# Patient Record
Sex: FEMALE | Race: WHITE | Employment: UNEMPLOYED | URBAN - METROPOLITAN AREA
[De-identification: names, ages, dates, MRNs, and addresses within clinical notes are randomized per-mention and may not be internally consistent; named-entity substitution may affect disease eponyms.]

---

## 2018-07-30 ENCOUNTER — OFFICE VISIT (OUTPATIENT)
Dept: DERMATOLOGY | Age: 83
End: 2018-07-30
Payer: MEDICARE

## 2018-07-30 VITALS
HEIGHT: 66 IN | OXYGEN SATURATION: 98 % | DIASTOLIC BLOOD PRESSURE: 71 MMHG | HEART RATE: 57 BPM | SYSTOLIC BLOOD PRESSURE: 133 MMHG | BODY MASS INDEX: 20.6 KG/M2 | WEIGHT: 128.2 LBS

## 2018-07-30 DIAGNOSIS — L08.9 PUSTULE: Primary | ICD-10-CM

## 2018-07-30 DIAGNOSIS — L57.0 ACTINIC KERATOSIS: ICD-10-CM

## 2018-07-30 DIAGNOSIS — L57.8 ACTINIC SKIN DAMAGE: ICD-10-CM

## 2018-07-30 PROCEDURE — 1123F ACP DISCUSS/DSCN MKR DOCD: CPT | Performed by: DERMATOLOGY

## 2018-07-30 PROCEDURE — 4040F PNEUMOC VAC/ADMIN/RCVD: CPT | Performed by: DERMATOLOGY

## 2018-07-30 PROCEDURE — G8420 CALC BMI NORM PARAMETERS: HCPCS | Performed by: DERMATOLOGY

## 2018-07-30 PROCEDURE — 1101F PT FALLS ASSESS-DOCD LE1/YR: CPT | Performed by: DERMATOLOGY

## 2018-07-30 PROCEDURE — G8400 PT W/DXA NO RESULTS DOC: HCPCS | Performed by: DERMATOLOGY

## 2018-07-30 PROCEDURE — G8427 DOCREV CUR MEDS BY ELIG CLIN: HCPCS | Performed by: DERMATOLOGY

## 2018-07-30 PROCEDURE — 1090F PRES/ABSN URINE INCON ASSESS: CPT | Performed by: DERMATOLOGY

## 2018-07-30 PROCEDURE — 99203 OFFICE O/P NEW LOW 30 MIN: CPT | Performed by: DERMATOLOGY

## 2018-07-30 PROCEDURE — 1036F TOBACCO NON-USER: CPT | Performed by: DERMATOLOGY

## 2018-07-30 RX ORDER — SIMVASTATIN 40 MG
10 TABLET ORAL
Refills: 0 | COMMUNITY
Start: 2018-06-14 | End: 2020-08-06 | Stop reason: SDUPTHER

## 2018-07-30 RX ORDER — LEVOTHYROXINE SODIUM 0.05 MG/1
75 TABLET ORAL DAILY
COMMUNITY
End: 2020-08-06 | Stop reason: SDUPTHER

## 2018-07-30 RX ORDER — RIVAROXABAN 20 MG/1
TABLET, FILM COATED ORAL
Refills: 0 | COMMUNITY
Start: 2018-07-27 | End: 2021-04-27

## 2018-07-30 RX ORDER — FLUOROURACIL 50 MG/G
CREAM TOPICAL
Qty: 40 G | Refills: 0 | Status: SHIPPED | OUTPATIENT
Start: 2018-07-30 | End: 2021-04-27

## 2019-04-24 ENCOUNTER — HOSPITAL ENCOUNTER (OUTPATIENT)
Age: 84
Setting detail: SPECIMEN
Discharge: HOME | End: 2019-04-24
Payer: MEDICARE

## 2019-04-24 LAB
ABSOLUTE EOS #: 0.22 K/UL (ref 0–0.44)
ABSOLUTE IMMATURE GRANULOCYTE: 0.03 K/UL (ref 0–0.3)
ABSOLUTE LYMPH #: 1.68 K/UL (ref 1.1–3.7)
ABSOLUTE MONO #: 0.73 K/UL (ref 0.1–1.2)
ANION GAP SERPL CALCULATED.3IONS-SCNC: 11 MMOL/L (ref 9–17)
BASOPHILS # BLD: 1 % (ref 0–2)
BASOPHILS ABSOLUTE: 0.05 K/UL (ref 0–0.2)
BUN BLDV-MCNC: 11 MG/DL (ref 8–23)
BUN/CREAT BLD: NORMAL (ref 9–20)
CALCIUM SERPL-MCNC: 8.9 MG/DL (ref 8.6–10.4)
CHLORIDE BLD-SCNC: 99 MMOL/L (ref 98–107)
CO2: 25 MMOL/L (ref 20–31)
CREAT SERPL-MCNC: 0.79 MG/DL (ref 0.5–0.9)
DIFFERENTIAL TYPE: ABNORMAL
EOSINOPHILS RELATIVE PERCENT: 3 % (ref 1–4)
GFR AFRICAN AMERICAN: >60 ML/MIN
GFR NON-AFRICAN AMERICAN: >60 ML/MIN
GFR SERPL CREATININE-BSD FRML MDRD: NORMAL ML/MIN/{1.73_M2}
GFR SERPL CREATININE-BSD FRML MDRD: NORMAL ML/MIN/{1.73_M2}
GLUCOSE BLD-MCNC: 83 MG/DL (ref 70–99)
HCT VFR BLD CALC: 37.9 % (ref 36.3–47.1)
HEMOGLOBIN: 12.8 G/DL (ref 11.9–15.1)
IMMATURE GRANULOCYTES: 0 %
LYMPHOCYTES # BLD: 22 % (ref 24–43)
MCH RBC QN AUTO: 33.1 PG (ref 25.2–33.5)
MCHC RBC AUTO-ENTMCNC: 33.8 G/DL (ref 28.4–34.8)
MCV RBC AUTO: 97.9 FL (ref 82.6–102.9)
MONOCYTES # BLD: 10 % (ref 3–12)
NRBC AUTOMATED: 0 PER 100 WBC
PDW BLD-RTO: 14.5 % (ref 11.8–14.4)
PLATELET # BLD: 246 K/UL (ref 138–453)
PLATELET ESTIMATE: ABNORMAL
PMV BLD AUTO: 9.3 FL (ref 8.1–13.5)
POTASSIUM SERPL-SCNC: 4 MMOL/L (ref 3.7–5.3)
RBC # BLD: 3.87 M/UL (ref 3.95–5.11)
RBC # BLD: ABNORMAL 10*6/UL
SEG NEUTROPHILS: 64 % (ref 36–65)
SEGMENTED NEUTROPHILS ABSOLUTE COUNT: 5.01 K/UL (ref 1.5–8.1)
SODIUM BLD-SCNC: 135 MMOL/L (ref 135–144)
WBC # BLD: 7.7 K/UL (ref 3.5–11.3)
WBC # BLD: ABNORMAL 10*3/UL

## 2019-04-24 PROCEDURE — 84436 ASSAY OF TOTAL THYROXINE: CPT

## 2019-04-24 PROCEDURE — 36415 COLL VENOUS BLD VENIPUNCTURE: CPT

## 2019-04-24 PROCEDURE — 84443 ASSAY THYROID STIM HORMONE: CPT

## 2019-04-24 PROCEDURE — 85025 COMPLETE CBC W/AUTO DIFF WBC: CPT

## 2019-04-24 PROCEDURE — 80048 BASIC METABOLIC PNL TOTAL CA: CPT

## 2019-04-24 PROCEDURE — P9603 ONE-WAY ALLOW PRORATED MILES: HCPCS

## 2019-04-24 PROCEDURE — 83735 ASSAY OF MAGNESIUM: CPT

## 2019-04-26 LAB
MAGNESIUM: 2 MG/DL (ref 1.6–2.6)
T4 TOTAL: 7.3 UG/DL (ref 4.5–12)
TSH SERPL DL<=0.05 MIU/L-ACNC: 49.73 MIU/L (ref 0.3–5)

## 2019-05-01 ENCOUNTER — HOSPITAL ENCOUNTER (OUTPATIENT)
Age: 84
Setting detail: SPECIMEN
Discharge: HOME | End: 2019-05-01
Payer: MEDICARE

## 2019-05-01 LAB
ABSOLUTE EOS #: 0.15 K/UL (ref 0–0.44)
ABSOLUTE IMMATURE GRANULOCYTE: <0.03 K/UL (ref 0–0.3)
ABSOLUTE LYMPH #: 1.74 K/UL (ref 1.1–3.7)
ABSOLUTE MONO #: 0.61 K/UL (ref 0.1–1.2)
ANION GAP SERPL CALCULATED.3IONS-SCNC: 11 MMOL/L (ref 9–17)
BASOPHILS # BLD: 1 % (ref 0–2)
BASOPHILS ABSOLUTE: 0.04 K/UL (ref 0–0.2)
BUN BLDV-MCNC: 13 MG/DL (ref 8–23)
BUN/CREAT BLD: NORMAL (ref 9–20)
CALCIUM SERPL-MCNC: 9.2 MG/DL (ref 8.6–10.4)
CHLORIDE BLD-SCNC: 103 MMOL/L (ref 98–107)
CO2: 27 MMOL/L (ref 20–31)
CREAT SERPL-MCNC: 0.76 MG/DL (ref 0.5–0.9)
DIFFERENTIAL TYPE: ABNORMAL
EOSINOPHILS RELATIVE PERCENT: 3 % (ref 1–4)
GFR AFRICAN AMERICAN: >60 ML/MIN
GFR NON-AFRICAN AMERICAN: >60 ML/MIN
GFR SERPL CREATININE-BSD FRML MDRD: NORMAL ML/MIN/{1.73_M2}
GFR SERPL CREATININE-BSD FRML MDRD: NORMAL ML/MIN/{1.73_M2}
GLUCOSE BLD-MCNC: 82 MG/DL (ref 70–99)
HCT VFR BLD CALC: 33.7 % (ref 36.3–47.1)
HEMOGLOBIN: 11.5 G/DL (ref 11.9–15.1)
IMMATURE GRANULOCYTES: 0 %
LYMPHOCYTES # BLD: 31 % (ref 24–43)
MCH RBC QN AUTO: 33.8 PG (ref 25.2–33.5)
MCHC RBC AUTO-ENTMCNC: 34.1 G/DL (ref 28.4–34.8)
MCV RBC AUTO: 99.1 FL (ref 82.6–102.9)
MONOCYTES # BLD: 11 % (ref 3–12)
NRBC AUTOMATED: 0 PER 100 WBC
PDW BLD-RTO: 14.2 % (ref 11.8–14.4)
PLATELET # BLD: 277 K/UL (ref 138–453)
PLATELET ESTIMATE: ABNORMAL
PMV BLD AUTO: 9.9 FL (ref 8.1–13.5)
POTASSIUM SERPL-SCNC: 3.8 MMOL/L (ref 3.7–5.3)
RBC # BLD: 3.4 M/UL (ref 3.95–5.11)
RBC # BLD: ABNORMAL 10*6/UL
SEG NEUTROPHILS: 54 % (ref 36–65)
SEGMENTED NEUTROPHILS ABSOLUTE COUNT: 3.13 K/UL (ref 1.5–8.1)
SODIUM BLD-SCNC: 141 MMOL/L (ref 135–144)
WBC # BLD: 5.7 K/UL (ref 3.5–11.3)
WBC # BLD: ABNORMAL 10*3/UL

## 2019-05-01 PROCEDURE — 36415 COLL VENOUS BLD VENIPUNCTURE: CPT

## 2019-05-01 PROCEDURE — 85025 COMPLETE CBC W/AUTO DIFF WBC: CPT

## 2019-05-01 PROCEDURE — P9603 ONE-WAY ALLOW PRORATED MILES: HCPCS

## 2019-05-01 PROCEDURE — 80048 BASIC METABOLIC PNL TOTAL CA: CPT

## 2019-07-30 ENCOUNTER — OFFICE VISIT (OUTPATIENT)
Dept: DERMATOLOGY | Age: 84
End: 2019-07-30
Payer: MEDICARE

## 2019-07-30 VITALS
HEIGHT: 66 IN | DIASTOLIC BLOOD PRESSURE: 62 MMHG | SYSTOLIC BLOOD PRESSURE: 109 MMHG | OXYGEN SATURATION: 96 % | HEART RATE: 57 BPM | WEIGHT: 129.8 LBS | BODY MASS INDEX: 20.86 KG/M2

## 2019-07-30 DIAGNOSIS — D48.5 NEOPLASM OF UNCERTAIN BEHAVIOR OF SKIN: Primary | ICD-10-CM

## 2019-07-30 DIAGNOSIS — L57.0 ACTINIC KERATOSIS: ICD-10-CM

## 2019-07-30 PROCEDURE — 17003 DESTRUCT PREMALG LES 2-14: CPT | Performed by: DERMATOLOGY

## 2019-07-30 PROCEDURE — 11102 TANGNTL BX SKIN SINGLE LES: CPT | Performed by: DERMATOLOGY

## 2019-07-30 PROCEDURE — 17000 DESTRUCT PREMALG LESION: CPT | Performed by: DERMATOLOGY

## 2019-07-31 ENCOUNTER — HOSPITAL ENCOUNTER (OUTPATIENT)
Age: 84
Setting detail: SPECIMEN
Discharge: HOME | End: 2019-07-31
Payer: MEDICARE

## 2019-07-31 RX ORDER — LIDOCAINE HYDROCHLORIDE AND EPINEPHRINE 10; 10 MG/ML; UG/ML
0.5 INJECTION, SOLUTION INFILTRATION; PERINEURAL ONCE
Status: COMPLETED | OUTPATIENT
Start: 2019-07-31 | End: 2019-07-31

## 2019-07-31 RX ADMIN — LIDOCAINE HYDROCHLORIDE AND EPINEPHRINE 0.5 ML: 10; 10 INJECTION, SOLUTION INFILTRATION; PERINEURAL at 07:08

## 2019-08-01 ENCOUNTER — TELEPHONE (OUTPATIENT)
Dept: DERMATOLOGY | Age: 84
End: 2019-08-01

## 2019-08-05 ENCOUNTER — TELEPHONE (OUTPATIENT)
Dept: DERMATOLOGY | Age: 84
End: 2019-08-05

## 2019-08-05 DIAGNOSIS — D48.1 ATYPICAL FIBROXANTHOMA: Primary | ICD-10-CM

## 2019-08-05 LAB — DERMATOLOGY PATHOLOGY REPORT: NORMAL

## 2019-08-05 NOTE — TELEPHONE ENCOUNTER
I called and discussed results with patient - AFX - needs Moh's surgery - I believe Chrissie Hubbard is the surgeon in network for her so that is where I placed referral - if not let me know and I will change to [employer],    Madie Burkett

## 2020-07-31 ENCOUNTER — OFFICE VISIT (OUTPATIENT)
Dept: FAMILY MEDICINE CLINIC | Age: 85
End: 2020-07-31
Payer: MEDICARE

## 2020-07-31 VITALS
BODY MASS INDEX: 21.56 KG/M2 | SYSTOLIC BLOOD PRESSURE: 129 MMHG | TEMPERATURE: 97 F | OXYGEN SATURATION: 98 % | WEIGHT: 129.4 LBS | HEART RATE: 49 BPM | HEIGHT: 65 IN | DIASTOLIC BLOOD PRESSURE: 62 MMHG

## 2020-07-31 PROCEDURE — 1090F PRES/ABSN URINE INCON ASSESS: CPT | Performed by: FAMILY MEDICINE

## 2020-07-31 PROCEDURE — 99205 OFFICE O/P NEW HI 60 MIN: CPT | Performed by: FAMILY MEDICINE

## 2020-07-31 PROCEDURE — G8427 DOCREV CUR MEDS BY ELIG CLIN: HCPCS | Performed by: FAMILY MEDICINE

## 2020-07-31 PROCEDURE — G8420 CALC BMI NORM PARAMETERS: HCPCS | Performed by: FAMILY MEDICINE

## 2020-07-31 PROCEDURE — 4040F PNEUMOC VAC/ADMIN/RCVD: CPT | Performed by: FAMILY MEDICINE

## 2020-07-31 PROCEDURE — 1123F ACP DISCUSS/DSCN MKR DOCD: CPT | Performed by: FAMILY MEDICINE

## 2020-07-31 PROCEDURE — 1036F TOBACCO NON-USER: CPT | Performed by: FAMILY MEDICINE

## 2020-07-31 PROCEDURE — G8400 PT W/DXA NO RESULTS DOC: HCPCS | Performed by: FAMILY MEDICINE

## 2020-07-31 RX ORDER — MULTIVITAMIN/IRON/FOLIC ACID 18MG-0.4MG
400 TABLET ORAL 2 TIMES DAILY
COMMUNITY
End: 2021-07-14

## 2020-07-31 RX ORDER — PANCRELIPASE 60000; 12000; 38000 [USP'U]/1; [USP'U]/1; [USP'U]/1
12000 CAPSULE, DELAYED RELEASE PELLETS ORAL
COMMUNITY
End: 2020-08-06 | Stop reason: SDUPTHER

## 2020-07-31 RX ORDER — PANTOPRAZOLE SODIUM 40 MG/1
40 TABLET, DELAYED RELEASE ORAL DAILY
COMMUNITY
End: 2020-08-06 | Stop reason: SDUPTHER

## 2020-07-31 SDOH — ECONOMIC STABILITY: INCOME INSECURITY: HOW HARD IS IT FOR YOU TO PAY FOR THE VERY BASICS LIKE FOOD, HOUSING, MEDICAL CARE, AND HEATING?: NOT HARD AT ALL

## 2020-07-31 SDOH — ECONOMIC STABILITY: FOOD INSECURITY: WITHIN THE PAST 12 MONTHS, YOU WORRIED THAT YOUR FOOD WOULD RUN OUT BEFORE YOU GOT MONEY TO BUY MORE.: NEVER TRUE

## 2020-07-31 SDOH — ECONOMIC STABILITY: FOOD INSECURITY: WITHIN THE PAST 12 MONTHS, THE FOOD YOU BOUGHT JUST DIDN'T LAST AND YOU DIDN'T HAVE MONEY TO GET MORE.: NEVER TRUE

## 2020-07-31 ASSESSMENT — PATIENT HEALTH QUESTIONNAIRE - PHQ9
1. LITTLE INTEREST OR PLEASURE IN DOING THINGS: 0
SUM OF ALL RESPONSES TO PHQ9 QUESTIONS 1 & 2: 0
2. FEELING DOWN, DEPRESSED OR HOPELESS: 0
SUM OF ALL RESPONSES TO PHQ QUESTIONS 1-9: 0
SUM OF ALL RESPONSES TO PHQ QUESTIONS 1-9: 0

## 2020-07-31 NOTE — PROGRESS NOTES
[address] [facility]  [facility] [address] [facility] [address]  Dept: [phone]      Bo Crandall is a 82 y.o. female who presents today for follow up on her  medical conditions as noted below. Chief Complaint   Patient presents with    New Patient       There is no problem list on file for this patient. History reviewed. No pertinent past medical history. History reviewed. No pertinent surgical history. History reviewed. No pertinent family history. Current Outpatient Medications   Medication Sig Dispense Refill    lipase-protease-amylase (CREON) 67421 units delayed release capsule Take 12,000 Units by mouth 3 times daily (with meals)      apixaban (ELIQUIS) 2.5 MG TABS tablet Take by mouth 2 times daily      pantoprazole (PROTONIX) 40 MG tablet Take 40 mg by mouth daily      Magnesium Oxide 250 MG TABS Take 125 mg by mouth 2 times daily       levothyroxine (SYNTHROID) 50 MCG tablet Take 75 mcg by mouth Daily       simvastatin (ZOCOR) 40 MG tablet 10 mg   0    metoprolol tartrate (LOPRESSOR) 25 MG tablet   0    XARELTO 20 MG TABS tablet   0    fluorouracil (EFUDEX) 5 % cream Apply twice daily to actinic keratosis on nose and cheeks for 2 weeks stopping when red and irritated (Patient not taking: Reported on 7/31/2020) 40 g 0     No current facility-administered medications for this visit.       ALLERGIES:    Allergies   Allergen Reactions    Hydrocodone        Social History     Tobacco Use    Smoking status: Never Smoker    Smokeless tobacco: Never Used   Substance Use Topics    Alcohol use: Not on file        BUN (mg/dL)   Date Value   05/01/2019 13     CREATININE (mg/dL)   Date Value   05/01/2019 0.76     Glucose (mg/dL)   Date Value   05/01/2019 82              Subjective:      HPI  She is being seen today as a new patient to establish care  She has a history of atrial fibrillation although the daughter states she no longer has it when she saw the last cardiologist yet despite that she is still on Eliquis she does have an appointment to see a new cardiologist today  Her heart rates have been in the 40s she currently is not taking metoprolol or any other antiarrhythmic  She is exceeding significantly hypothyroid and has not been reevaluated in over a year and is only taking 50 mcg of levothyroxine  She is on magnesium and has not had that number rechecked  The daughter says she has cognition problems forgets to take her medications  They are trying to get her to move out of her home she does have a visiting nurse comes a couple days a week she is very forgetful about doing things and the daughter is quite worried about her living at home despite they go there every day and check on her    Review of Systems:     Constitutional: Negative for fever, appetite change and fatigue. Family social and medical history reviewed and unchanged     HENT: Negative. Negative for nosebleeds, trouble swallowing and neck pain. Eyes: Negative for photophobia and visual disturbance. Respiratory: Negative. Negative for chest tightness and shortness of breath. Cardiovascular: Negative. Negative for chest pain and leg swelling. Gastrointestinal: Negative. Negative for abdominal pain and blood in stool. Endocrine: Negative for cold intolerance and polyuria. Genitourinary: Negative for dysuria and hematuria. Musculoskeletal: Negative. Skin: Negative for rash. Allergic/Immunologic: Negative. Neurological: Negative. Negative for dizziness, weakness and numbness. Hematological: Negative. Negative for adenopathy. Does not bruise/bleed easily. Psychiatric/Behavioral: Negative for sleep disturbance, dysphoric mood and  decreased concentration. The patient is not nervous/anxious. Objective:     Physical Exam:     Nursing note and vitals reviewed.   /62   Pulse (!) 49   Temp 97 °F (36.1 °C)   Ht 5' 5\" (1.651 m) Wt 129 lb 6.4 oz (58.7 kg)   SpO2 98%   BMI 21.53 kg/m²   Constitutional: She is oriented to person, place, and time. She   appears well-developed and well-nourished. HENT:   Head: Normocephalic and atraumatic. Right Ear: External ear normal. Tympanic membrane is not erythematous. No middle ear effusion. Left Ear: External ear normal. Tympanic membrane is not erythematous. No middle ear effusion. Nose: No mucosal edema. Mouth/Throat: Oropharynx is clear and moist. No posterior oropharyngeal erythema. Eyes: Conjunctivae and EOM are normal. Pupils are equal, round, and reactive to light. Neck: Normal range of motion. Neck supple. No thyromegaly present. Cardiovascular: Normal rate, regular rhythm and normal heart sounds. No murmur heard. Pulmonary/Chest: Effort normal and breath sounds normal. She has no wheezes. Shehas no rales. Abdominal: Soft. Bowel sounds are normal. She exhibits no distension and no mass. There is no tenderness. There is no rebound and no guarding. Genitourinary/Anorectal:deferred  Musculoskeletal: Normal range of motion. She exhibits no edema or tenderness. Lymphadenopathy: She has no cervical adenopathy. Neurological: She is alert and oriented to person, place, and time. She has normal reflexes. Skin: Skin is warm and dry. No rash noted. Psychiatric: She has a normal mood and affect. Her   behavior is normal.       Assessment:      1. Permanent atrial fibrillation    2. Essential hypertension    3. Other chronic pancreatitis ([facility] [address]    4. Hypercholesteremia    5. Acquired hypothyroidism    6. Hypomagnesemia    7. Vitamin D deficiency    8. Fatigue, unspecified type          Plan:      Call or return to clinic prn if these symptoms worsen or fail to improve as anticipated. I have reviewed the instructions with the patient, answering all questions to her satisfaction. Return if symptoms worsen or fail to improve.   Orders Placed This Encounter   Procedures  CBC Auto Differential     Standing Status:   Future     Standing Expiration Date:   7/31/2021    Comprehensive Metabolic Panel     Standing Status:   Future     Standing Expiration Date:   7/31/2021    Lipid Panel     Standing Status:   Future     Standing Expiration Date:   7/31/2021     Order Specific Question:   Is Patient Fasting?/# of Hours     Answer:   yes    TSH without Reflex     Standing Status:   Future     Standing Expiration Date:   7/31/2021    Thyroid Peroxidase Antibody     Standing Status:   Future     Standing Expiration Date:   7/31/2021    T4, Free     Standing Status:   Future     Standing Expiration Date:   7/31/2021    Vitamin D 25 Hydroxy     Standing Status:   Future     Standing Expiration Date:   7/31/2021    Magnesium     Standing Status:   Future     Standing Expiration Date:   1/31/2021    Phosphorus     Standing Status:   Future     Standing Expiration Date:   1/31/2021    Lipase     Standing Status:   Future     Standing Expiration Date:   7/31/2021    Amylase     Standing Status:   Future     Standing Expiration Date:   7/31/2021     No orders of the defined types were placed in this encounter.     Had an extensive conversation with the daughter and patient about the patient moving into an assisted living facility I think this would be in her best interest she has difficulty ambulating as well needs to use a walker and has had frequent falls  Electronically signed by Man Harper DO on 7/31/2020 at 1:40 PM

## 2020-08-03 ENCOUNTER — TELEPHONE (OUTPATIENT)
Dept: FAMILY MEDICINE CLINIC | Age: 85
End: 2020-08-03

## 2020-08-03 NOTE — TELEPHONE ENCOUNTER
Pt states she lives in [facility] and is not under a nurses care and would need an order for COVID testing.

## 2020-08-03 NOTE — TELEPHONE ENCOUNTER
Martin Boo  She can but if she is in a nursing home the order will probably have to come from the nursing home

## 2020-08-03 NOTE — TELEPHONE ENCOUNTER
Pt currently in a nursing home and one of the residents tested positive for covid and she would like a test ordered. She is currently not symptomatic.

## 2020-08-03 NOTE — TELEPHONE ENCOUNTER
I tired to call patient to let her know that ordered was completed for Covid-19 test no answer left VM.

## 2020-08-06 RX ORDER — LEVOTHYROXINE SODIUM 0.07 MG/1
75 TABLET ORAL DAILY
Qty: 30 TABLET | Refills: 3 | Status: SHIPPED | OUTPATIENT
Start: 2020-08-06 | End: 2020-11-20

## 2020-08-06 RX ORDER — PANTOPRAZOLE SODIUM 40 MG/1
40 TABLET, DELAYED RELEASE ORAL DAILY
Qty: 30 TABLET | Refills: 3 | Status: SHIPPED | OUTPATIENT
Start: 2020-08-06 | End: 2021-01-04

## 2020-08-06 RX ORDER — SIMVASTATIN 10 MG
10 TABLET ORAL NIGHTLY
Qty: 30 TABLET | Refills: 3 | Status: SHIPPED | OUTPATIENT
Start: 2020-08-06 | End: 2020-11-30

## 2020-08-06 RX ORDER — PANCRELIPASE 60000; 12000; 38000 [USP'U]/1; [USP'U]/1; [USP'U]/1
12000 CAPSULE, DELAYED RELEASE PELLETS ORAL
Qty: 270 CAPSULE | Refills: 3 | Status: SHIPPED | OUTPATIENT
Start: 2020-08-06 | End: 2021-08-16

## 2020-08-06 NOTE — TELEPHONE ENCOUNTER
Abraham Worley is calling to request a refill on the following medication(s):    Last Visit Date (If Applicable):  4/57/8932    Next Visit Date:    8/28/2020    Medication Request:  Requested Prescriptions     Pending Prescriptions Disp Refills    pantoprazole (PROTONIX) 40 MG tablet 30 tablet 3     Sig: Take 1 tablet by mouth daily    apixaban (ELIQUIS) 2.5 MG TABS tablet 60 tablet 3     Sig: Take 1 tablet by mouth 2 times daily    lipase-protease-amylase (CREON) 03911 units delayed release capsule 270 capsule 3     Sig: Take 1 capsule by mouth 3 times daily (with meals)    simvastatin (ZOCOR) 10 MG tablet 30 tablet 3     Sig: Take 1 tablet by mouth nightly    levothyroxine (SYNTHROID) 75 MCG tablet 30 tablet 3     Sig: Take 1 tablet by mouth Daily

## 2020-08-13 LAB
ALBUMIN SERPL-MCNC: NORMAL G/DL
ALP BLD-CCNC: NORMAL U/L
ALT SERPL-CCNC: NORMAL U/L
AMYLASE: NORMAL
ANION GAP SERPL CALCULATED.3IONS-SCNC: NORMAL MMOL/L
AST SERPL-CCNC: NORMAL U/L
BASOPHILS ABSOLUTE: NORMAL
BASOPHILS RELATIVE PERCENT: NORMAL
BILIRUB SERPL-MCNC: NORMAL MG/DL
BUN BLDV-MCNC: NORMAL MG/DL
CALCIUM SERPL-MCNC: NORMAL MG/DL
CHLORIDE BLD-SCNC: NORMAL MMOL/L
CHOLESTEROL, TOTAL: 162 MG/DL
CHOLESTEROL/HDL RATIO: 3.1
CO2: NORMAL
CREAT SERPL-MCNC: NORMAL MG/DL
EOSINOPHILS ABSOLUTE: NORMAL
EOSINOPHILS RELATIVE PERCENT: NORMAL
GFR CALCULATED: NORMAL
GLUCOSE BLD-MCNC: NORMAL MG/DL
HCT VFR BLD CALC: NORMAL %
HDLC SERPL-MCNC: 52 MG/DL (ref 35–70)
HEMOGLOBIN: NORMAL
LDL CHOLESTEROL CALCULATED: 88 MG/DL (ref 0–160)
LIPASE: NORMAL
LYMPHOCYTES ABSOLUTE: NORMAL
LYMPHOCYTES RELATIVE PERCENT: NORMAL
MAGNESIUM: NORMAL
MCH RBC QN AUTO: NORMAL PG
MCHC RBC AUTO-ENTMCNC: NORMAL G/DL
MCV RBC AUTO: NORMAL FL
MONOCYTES ABSOLUTE: NORMAL
MONOCYTES RELATIVE PERCENT: NORMAL
NEUTROPHILS ABSOLUTE: NORMAL
NEUTROPHILS RELATIVE PERCENT: NORMAL
NONHDLC SERPL-MCNC: NORMAL MG/DL
PDW BLD-RTO: NORMAL %
PHOSPHORUS: NORMAL
PLATELET # BLD: NORMAL 10*3/UL
PMV BLD AUTO: NORMAL FL
POTASSIUM SERPL-SCNC: NORMAL MMOL/L
RBC # BLD: NORMAL 10*6/UL
SODIUM BLD-SCNC: NORMAL MMOL/L
T4 FREE: NORMAL
TOTAL PROTEIN: NORMAL
TRIGL SERPL-MCNC: 111 MG/DL
TSH SERPL DL<=0.05 MIU/L-ACNC: NORMAL M[IU]/L
VITAMIN D 25-HYDROXY: NORMAL
VITAMIN D2, 25 HYDROXY: NORMAL
VITAMIN D3,25 HYDROXY: NORMAL
VLDLC SERPL CALC-MCNC: 22 MG/DL
WBC # BLD: NORMAL 10*3/UL

## 2020-08-18 ENCOUNTER — TELEPHONE (OUTPATIENT)
Dept: FAMILY MEDICINE CLINIC | Age: 85
End: 2020-08-18

## 2020-08-18 NOTE — TELEPHONE ENCOUNTER
Phenergan can definitely cause this to happen I would avoid taking that any antinausea medication.   And just stick to her other main medications and see if her symptomatology improves

## 2020-08-18 NOTE — TELEPHONE ENCOUNTER
Pt daughter states when pt takes her meds in the morning, pt is dizzy and has trouble pronouncing word and is \"Mushy mouthed\" and has to lay down after taking meds. Pt last PCP prescribed phenergan for the dizziness but pt wants to know if some med changes can be made to fix this issue.

## 2020-08-27 ENCOUNTER — TELEPHONE (OUTPATIENT)
Dept: FAMILY MEDICINE CLINIC | Age: 85
End: 2020-08-27

## 2020-08-27 RX ORDER — HYDROCORTISONE 25 MG/G
CREAM TOPICAL
Qty: 54 G | Refills: 5 | Status: SHIPPED | OUTPATIENT
Start: 2020-08-27 | End: 2021-02-22

## 2020-08-28 ENCOUNTER — OFFICE VISIT (OUTPATIENT)
Dept: FAMILY MEDICINE CLINIC | Age: 85
End: 2020-08-28
Payer: MEDICARE

## 2020-08-28 VITALS
SYSTOLIC BLOOD PRESSURE: 170 MMHG | HEIGHT: 65 IN | DIASTOLIC BLOOD PRESSURE: 68 MMHG | OXYGEN SATURATION: 99 % | HEART RATE: 52 BPM | WEIGHT: 130.2 LBS | TEMPERATURE: 96.8 F | BODY MASS INDEX: 21.69 KG/M2

## 2020-08-28 PROCEDURE — 99214 OFFICE O/P EST MOD 30 MIN: CPT | Performed by: FAMILY MEDICINE

## 2020-08-28 PROCEDURE — G8420 CALC BMI NORM PARAMETERS: HCPCS | Performed by: FAMILY MEDICINE

## 2020-08-28 PROCEDURE — 1090F PRES/ABSN URINE INCON ASSESS: CPT | Performed by: FAMILY MEDICINE

## 2020-08-28 PROCEDURE — 1123F ACP DISCUSS/DSCN MKR DOCD: CPT | Performed by: FAMILY MEDICINE

## 2020-08-28 PROCEDURE — 4040F PNEUMOC VAC/ADMIN/RCVD: CPT | Performed by: FAMILY MEDICINE

## 2020-08-28 PROCEDURE — G8427 DOCREV CUR MEDS BY ELIG CLIN: HCPCS | Performed by: FAMILY MEDICINE

## 2020-08-28 PROCEDURE — G8400 PT W/DXA NO RESULTS DOC: HCPCS | Performed by: FAMILY MEDICINE

## 2020-08-28 PROCEDURE — 1036F TOBACCO NON-USER: CPT | Performed by: FAMILY MEDICINE

## 2020-08-28 RX ORDER — NYSTATIN 100000 U/G
CREAM TOPICAL
Qty: 1 TUBE | Refills: 2 | Status: SHIPPED | OUTPATIENT
Start: 2020-08-28

## 2020-08-28 NOTE — PROGRESS NOTES
[address] [facility]  [facility] [address] [facility] [address]  Dept: [phone]      Julisa Goldstein is a 82 y.o. female who presents today for follow up on her  medical conditions as noted below. Chief Complaint   Patient presents with    Medication Check    Hemorrhoids       There is no problem list on file for this patient. History reviewed. No pertinent past medical history. History reviewed. No pertinent surgical history. History reviewed. No pertinent family history. Current Outpatient Medications   Medication Sig Dispense Refill    nystatin (MYCOSTATIN) 377471 UNIT/GM cream Apply topically 2 times daily. 1 Tube 2    hydrocortisone (ANUSOL-HC) 2.5 % CREA rectal cream Apply qid prn hemmorroids 54 g 5    pantoprazole (PROTONIX) 40 MG tablet Take 1 tablet by mouth daily 30 tablet 3    apixaban (ELIQUIS) 2.5 MG TABS tablet Take 1 tablet by mouth 2 times daily 60 tablet 3    lipase-protease-amylase (CREON) 36607 units delayed release capsule Take 1 capsule by mouth 3 times daily (with meals) 270 capsule 3    simvastatin (ZOCOR) 10 MG tablet Take 1 tablet by mouth nightly 30 tablet 3    levothyroxine (SYNTHROID) 75 MCG tablet Take 1 tablet by mouth Daily 30 tablet 3    Magnesium Oxide 250 MG TABS Take 125 mg by mouth 2 times daily       fluorouracil (EFUDEX) 5 % cream Apply twice daily to actinic keratosis on nose and cheeks for 2 weeks stopping when red and irritated 40 g 0    metoprolol tartrate (LOPRESSOR) 25 MG tablet   0    XARELTO 20 MG TABS tablet   0     No current facility-administered medications for this visit.       ALLERGIES:    Allergies   Allergen Reactions    Hydrocodone        Social History     Tobacco Use    Smoking status: Never Smoker    Smokeless tobacco: Never Used   Substance Use Topics    Alcohol use: Not on file        LDL Calculated (mg/dL)   Date Value   08/13/2020 88     HDL (mg/dL)   Date Value 08/13/2020 52     BUN (mg/dL)   Date Value   05/01/2019 13     CREATININE (mg/dL)   Date Value   05/01/2019 0.76     Glucose (mg/dL)   Date Value   05/01/2019 82              Subjective:      HPI  She is being seen today stating that her hemorrhoids are really bothering her she has had a lot of burning and discomfort  Also there is some concern she has had ongoing dizziness  She recently had a Holter monitor and her heart rate tends to run rather low they do not think that she needs a pacemaker  Currently she is noncompliant with her meds her daughter will put them into pillbox but then she picks and chooses which when she takes  Review of Systems:     Constitutional: Negative for fever, appetite change and fatigue. Family social and medical history reviewed and unchanged     HENT: Negative. Negative for nosebleeds, trouble swallowing and neck pain. Eyes: Negative for photophobia and visual disturbance. Respiratory: Negative. Negative for chest tightness and shortness of breath. Cardiovascular: Negative. Negative for chest pain and leg swelling. Gastrointestinal: Negative. Negative for abdominal pain and blood in stool. Endocrine: Negative for cold intolerance and polyuria. Genitourinary: Negative for dysuria and hematuria. Musculoskeletal: Negative. Skin: Negative for rash. Allergic/Immunologic: Negative. Neurological: Negative. Negative for dizziness, weakness and numbness. Hematological: Negative. Negative for adenopathy. Does not bruise/bleed easily. Psychiatric/Behavioral: Negative for sleep disturbance, dysphoric mood and  decreased concentration. The patient is not nervous/anxious. Objective:     Physical Exam:     Nursing note and vitals reviewed. BP (!) 170/68   Pulse 52   Temp 96.8 °F (36 °C)   Ht 5' 5.05\" (1.652 m)   Wt 130 lb 3.2 oz (59.1 kg)   SpO2 99%   BMI 21.63 kg/m²   Constitutional: She is oriented to person, place, and time.  She   appears well-developed and well-nourished. HENT:   Head: Normocephalic and atraumatic. Right Ear: External ear normal. Tympanic membrane is not erythematous. No middle ear effusion. Left Ear: External ear normal. Tympanic membrane is not erythematous. No middle ear effusion. Nose: No mucosal edema. Mouth/Throat: Oropharynx is clear and moist. No posterior oropharyngeal erythema. Eyes: Conjunctivae and EOM are normal. Pupils are equal, round, and reactive to light. Neck: Normal range of motion. Neck supple. No thyromegaly present. Cardiovascular: Normal rate, regular rhythm and normal heart sounds. No murmur heard. Pulmonary/Chest: Effort normal and breath sounds normal. She has no wheezes. Shehas no rales. Abdominal: Soft. Bowel sounds are normal. She exhibits no distension and no mass. There is no tenderness. There is no rebound and no guarding. Genitourinary/Anorectal: Very large external hemorrhoid tags  Erythema around rectal area going into the vagina  Musculoskeletal: Normal range of motion. She exhibits no edema or tenderness. Lymphadenopathy: She has no cervical adenopathy. Neurological: She is alert and oriented to person, place, and time. She has normal reflexes. Skin: Skin is warm and dry. No rash noted. Psychiatric: She has a normal mood and affect. Her   behavior is normal.       Assessment:      1. Candidiasis    2. At risk for medication noncompliance    3. Essential hypertension    4. Permanent atrial fibrillation    5. Acquired hypothyroidism          Plan:      Call or return to clinic prn if these symptoms worsen or fail to improve as anticipated. I have reviewed the instructions with the patient, answering all questions to her satisfaction. Return if symptoms worsen or fail to improve. No orders of the defined types were placed in this encounter.     Orders Placed This Encounter   Medications    nystatin (MYCOSTATIN) 111230 UNIT/GM cream     Sig: Apply topically 2 times daily.      Dispense:  1 Tube     Refill:  2     Spent a while with both patient and daughter discussing the need to take medications regularly and not pick and choose  And then went over her med list to determine which meds should be a.m. or p.m. electronically signed by Juliane Jorge DO on 8/28/2020 at 12:56 PM

## 2020-09-16 ENCOUNTER — TELEPHONE (OUTPATIENT)
Dept: FAMILY MEDICINE CLINIC | Age: 85
End: 2020-09-16

## 2020-09-28 ENCOUNTER — HOSPITAL ENCOUNTER (OUTPATIENT)
Dept: MAMMOGRAPHY | Facility: CLINIC | Age: 85
Discharge: HOME | End: 2020-09-30
Payer: MEDICARE

## 2020-09-28 PROCEDURE — 77080 DXA BONE DENSITY AXIAL: CPT

## 2020-10-02 ENCOUNTER — PATIENT MESSAGE (OUTPATIENT)
Dept: FAMILY MEDICINE CLINIC | Age: 85
End: 2020-10-02

## 2020-10-03 NOTE — TELEPHONE ENCOUNTER
From: Herman Jones  To:  Devendra Cardenas DO  Sent: 10/2/2020 5:45 PM EDT  Subject: Test Results Question    I didn't get my text results from the blood work

## 2020-10-08 ENCOUNTER — TELEPHONE (OUTPATIENT)
Dept: FAMILY MEDICINE CLINIC | Age: 85
End: 2020-10-08

## 2020-11-12 ENCOUNTER — TELEMEDICINE (OUTPATIENT)
Dept: FAMILY MEDICINE CLINIC | Age: 85
End: 2020-11-12
Payer: MEDICARE

## 2020-11-12 PROCEDURE — 99215 OFFICE O/P EST HI 40 MIN: CPT | Performed by: FAMILY MEDICINE

## 2020-11-12 PROCEDURE — G8484 FLU IMMUNIZE NO ADMIN: HCPCS | Performed by: FAMILY MEDICINE

## 2020-11-12 PROCEDURE — G8427 DOCREV CUR MEDS BY ELIG CLIN: HCPCS | Performed by: FAMILY MEDICINE

## 2020-11-12 PROCEDURE — 1036F TOBACCO NON-USER: CPT | Performed by: FAMILY MEDICINE

## 2020-11-12 PROCEDURE — 1123F ACP DISCUSS/DSCN MKR DOCD: CPT | Performed by: FAMILY MEDICINE

## 2020-11-12 PROCEDURE — G8420 CALC BMI NORM PARAMETERS: HCPCS | Performed by: FAMILY MEDICINE

## 2020-11-12 PROCEDURE — 1090F PRES/ABSN URINE INCON ASSESS: CPT | Performed by: FAMILY MEDICINE

## 2020-11-12 PROCEDURE — 4040F PNEUMOC VAC/ADMIN/RCVD: CPT | Performed by: FAMILY MEDICINE

## 2020-11-12 PROCEDURE — G8399 PT W/DXA RESULTS DOCUMENT: HCPCS | Performed by: FAMILY MEDICINE

## 2020-11-12 ASSESSMENT — PATIENT HEALTH QUESTIONNAIRE - PHQ9
2. FEELING DOWN, DEPRESSED OR HOPELESS: 0
SUM OF ALL RESPONSES TO PHQ QUESTIONS 1-9: 0
1. LITTLE INTEREST OR PLEASURE IN DOING THINGS: 0
SUM OF ALL RESPONSES TO PHQ9 QUESTIONS 1 & 2: 0

## 2020-11-12 NOTE — LETTER
[address]  [facility] [address] [address]  Phone: [phone]  Fax: [phone]    Aniya Solorzano DO        November 12, 2020      Whom it may concern Re: Mora Cuevas is suffering from multiple medical issues that at this point require her to be in an assisted living facility. She is having ongoing problems with not eating and drinking because meals are not provided for her. Despite the fact her daughters bring her food  She is having problems with constipation and abdominal pain secondary to not eating and drinking well  She is noncompliant with her medication and picks and chooses which ones to take therefore lead to other abnormalities. She has ongoing issues with dizziness and lightheadedness. She is afraid to bathe or shower because she is scared to death she is going to fall in the shower so therefore she is not properly bathing  She is having increasing memory issues that are causing disruption in her day-to-day life  Will secondary to all of these issues it is necessary for her to be in a more monitored situation where somebody can be helping her pay helping with meals dispensing her medications to her monitoring her blood pressure and pulse and giving her day-to-day assistance.         Sincerely,        Caridad Nova, DO

## 2020-11-12 NOTE — PROGRESS NOTES
2020    TELEHEALTH EVALUATION -- Audio/Visual (During KOUVS-61 public health emergency)    HPI:    Reinaldo Bhatti (:  1935) has requested an audio/video evaluation for the following concern(s):    Is being seen today in virtual visit evaluation for follow-up on alteplase the day of ongoing complaints  She has been having lower abdominal pain worse with trying have a bowel movement she does admit she is terribly constipated it was recommended she try Colace and she has not yet done this she does not eat very much and drinks very little. 2 of her daughters thought a gluten allergy could be her problem so ever since she started that gluten-free diet her constipation seems to have been worse the other daughter would like her tested because she cannot convince her 2 other sisters that it is not the case that she has a gluten allergy  She gets dizziness worse with changing positions. She was given Phenergan by the house doctor to use for dizziness she is not nauseated  She is not compliant with taking her medications she selects 1 or another to take various days. She states she has a plugged feeling in both ears she has had wax in her ears before and had to have it removed  She has a lot of nasal irritation worse after she had a Mohs procedure and apparently her septum was here it was punctured  She has ongoing issues with memory that seem to be worsening  And she is resistant to bathing because she is very afraid she is going to fall in the shower so she refuses to bathe    Review of Systems    Prior to Visit Medications    Medication Sig Taking? Authorizing Provider   nystatin (MYCOSTATIN) 010564 UNIT/GM cream Apply topically 2 times daily.   Caridad Nova, DO   hydrocortisone (ANUSOL-HC) 2.5 % CREA rectal cream Apply qid prn hemmorroids  Caridad Nova, DO   pantoprazole (PROTONIX) 40 MG tablet Take 1 tablet by mouth daily  Caridad Nova DO   apixaban (ELIQUIS) 2.5 MG TABS tablet Take 1 tablet by mouth 2 times daily  Caridad Nova DO   lipase-protease-amylase (CREON) 81897 units delayed release capsule Take 1 capsule by mouth 3 times daily (with meals)  Caridad Nova DO   simvastatin (ZOCOR) 10 MG tablet Take 1 tablet by mouth nightly  Caridad Nova DO   levothyroxine (SYNTHROID) 75 MCG tablet Take 1 tablet by mouth Daily  Caridad Nova DO   Magnesium Oxide 250 MG TABS Take 125 mg by mouth 2 times daily   Historical Provider, MD   XARELTO 20 MG TABS tablet   Historical Provider, MD   fluorouracil (EFUDEX) 5 % cream Apply twice daily to actinic keratosis on nose and cheeks for 2 weeks stopping when red and irritated  Leeann Peralta MD       Social History     Tobacco Use    Smoking status: Never Smoker    Smokeless tobacco: Never Used   Substance Use Topics    Alcohol use: Not on file    Drug use: Not on file        Allergies   Allergen Reactions    Hydrocodone    , History reviewed. No pertinent past medical history. , History reviewed. No pertinent surgical history. ,   Social History     Tobacco Use    Smoking status: Never Smoker    Smokeless tobacco: Never Used   Substance Use Topics    Alcohol use: Not on file    Drug use: Not on file   , History reviewed. No pertinent family history. PHYSICAL EXAMINATION:  [ INSTRUCTIONS:  \"[x]\" Indicates a positive item  \"[]\" Indicates a negative item  -- DELETE ALL ITEMS NOT EXAMINED]  Vital Signs: (As obtained by patient/caregiver or practitioner observation)    Blood pressure-  Heart rate-    Respiratory rate-    Temperature-  Pulse oximetry-     Constitutional: [x] Appears well-developed and well-nourished [x] No apparent distress      [] Abnormal-   Mental status  [x] Alert and awake  [x] Oriented to person/place/time [x]Able to follow commands      Eyes:  EOM    [x]  Normal  [] Abnormal-  Sclera  [x]  Normal  [] Abnormal -         Discharge [x]  None visible  [] Abnormal -    HENT:   [x] Normocephalic, atraumatic.   [] Abnormal   [x] Mouth/Throat: Mucous membranes are moist.     External Ears [x] Normal  [] Abnormal-     Neck: [x] No visualized mass     Pulmonary/Chest: [x] Respiratory effort normal.  [x] No visualized signs of difficulty breathing or respiratory distress        [] Abnormal-      Musculoskeletal:   [x] Normal gait with no signs of ataxia         [x] Normal range of motion of neck        [] Abnormal-       Neurological:        [x] No Facial Asymmetry (Cranial nerve 7 motor function) (limited exam to video visit)          [x] No gaze palsy        [] Abnormal-         Skin:        [x] No significant exanthematous lesions or discoloration noted on facial skin         [] Abnormal-            Psychiatric:       [x] Normal Affect [x] No Hallucinations        [] Abnormal-     Other pertinent observable physical exam findings-     ASSESSMENT/PLAN:  1. Lower abdominal pain    2. Constipation, unspecified constipation type    3. Essential hypertension    4. Dizziness    5. Decreased oral intake    6. Noncompliance with medication regimen    7. Plugged feeling in ear, bilateral    8. Nose irritation    9. Memory problem      Orders Placed This Encounter   Procedures    Celiac Reflex Panel     Requested Prescriptions      No prescriptions requested or ordered in this encounter     Discussed with daughter and patient she needs to drink more fluids and eat better food she needs to avoid a gluten-free diet she can try Debrox in her ears she can try saline nasal spray in her nose   I fully agree that she needs to move into an assisted living facility  Return if symptoms worsen or fail to improve. Alena Lagunas is a 82 y.o. female being evaluated by a Virtual Visit (video visit) encounter to address concerns as mentioned above. A caregiver was present when appropriate.  Due to this being a TeleHealth encounter (During SIEJJ-69 public health emergency), evaluation of the following organ systems was limited: Vitals/Constitutional/EENT/Resp/CV/GI//MS/Neuro/Skin/Heme-Lymph-Imm. Pursuant to the emergency declaration under the [address] [place] [address], [address] [place] [address] and the [employer] Act, this Virtual Visit was conducted with patient's (and/or legal guardian's) consent, to reduce the patient's risk of exposure to COVID-19 and provide necessary medical care. The patient (and/or legal guardian) has also been advised to contact this office for worsening conditions or problems, and seek emergency medical treatment and/or call 911 if deemed necessary. Patient identification was verified at the start of the visit: Yes    Total time spent on this encounter: Not billed by time    Services were provided through a video synchronous discussion virtually to substitute for in-person clinic visit. Patient and provider were located at their individual homes. --Megan Cantu, DO on 11/12/2020 at 12:24 PM    An electronic signature was used to authenticate this note.

## 2020-11-19 ENCOUNTER — PATIENT MESSAGE (OUTPATIENT)
Dept: FAMILY MEDICINE CLINIC | Age: 85
End: 2020-11-19

## 2020-11-20 RX ORDER — LEVOTHYROXINE SODIUM 0.07 MG/1
TABLET ORAL
Qty: 30 TABLET | Refills: 3 | Status: SHIPPED | OUTPATIENT
Start: 2020-11-20 | End: 2021-03-22

## 2020-11-23 ENCOUNTER — TELEPHONE (OUTPATIENT)
Dept: FAMILY MEDICINE CLINIC | Age: 85
End: 2020-11-23

## 2020-11-23 NOTE — TELEPHONE ENCOUNTER
I do not think Phenergan is going to help you with your dizziness  You do not consume enough liquids which is a major problem causing it and your blood pressure was too high

## 2020-11-23 NOTE — TELEPHONE ENCOUNTER
Pt states that she has been having dizzy spells for the past couple weeks and  Pt states she was taking phenergan and it was helping with dizziness but was told by her daughter that she was suppose to stop phenergan. Pt wants to know is she supposed to be taking phenergan?

## 2020-11-30 ENCOUNTER — TELEPHONE (OUTPATIENT)
Dept: FAMILY MEDICINE CLINIC | Age: 85
End: 2020-11-30

## 2020-11-30 RX ORDER — SIMVASTATIN 10 MG
10 TABLET ORAL NIGHTLY
Qty: 30 TABLET | Refills: 3 | Status: SHIPPED | OUTPATIENT
Start: 2020-11-30 | End: 2021-04-14

## 2020-11-30 NOTE — TELEPHONE ENCOUNTER
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  Please advise

## 2020-12-02 ENCOUNTER — TELEPHONE (OUTPATIENT)
Dept: FAMILY MEDICINE CLINIC | Age: 85
End: 2020-12-02

## 2020-12-16 ENCOUNTER — TELEPHONE (OUTPATIENT)
Dept: FAMILY MEDICINE CLINIC | Age: 85
End: 2020-12-16

## 2020-12-16 ENCOUNTER — PATIENT MESSAGE (OUTPATIENT)
Dept: FAMILY MEDICINE CLINIC | Age: 85
End: 2020-12-16

## 2020-12-16 NOTE — TELEPHONE ENCOUNTER
From: Ayala Morales  To: Winifred Austin DO  Sent: 12/16/2020 3:59 PM EST  Subject: Test Results Question    My mom was home for one night and had to go back into the hospital. I'm sure you've seen all the testing that's been put into her my chart. She's at [facility]. I'm hoping that you can check her over after she's home a few days to make sure that she's fine. She was tested Tuesday for covid again and it was negative. I'd like to schedule an appointment for her if that's possible soon thank you.  This is her daughter [facility]

## 2020-12-21 ENCOUNTER — TELEPHONE (OUTPATIENT)
Dept: FAMILY MEDICINE CLINIC | Age: 85
End: 2020-12-21

## 2020-12-21 RX ORDER — POLYETHYLENE GLYCOL 3350 17 G/17G
17 POWDER, FOR SOLUTION ORAL DAILY PRN
Qty: 1530 G | Refills: 1 | Status: SHIPPED | OUTPATIENT
Start: 2020-12-21 | End: 2021-01-20

## 2020-12-30 NOTE — TELEPHONE ENCOUNTER
Aundrea Villatoro is calling to request a refill on the following medication(s):    Last Visit Date (If Applicable):  34/33/6022    Next Visit Date:    12/29/2020    Medication Request:  Requested Prescriptions     Pending Prescriptions Disp Refills    pantoprazole (PROTONIX) 40 MG tablet [Pharmacy Med Name: PANTOPRAZOLE SOD DR 40 MG TAB] 30 tablet 3     Sig: take 1 tablet by mouth once daily

## 2021-01-04 RX ORDER — PANTOPRAZOLE SODIUM 40 MG/1
TABLET, DELAYED RELEASE ORAL
Qty: 30 TABLET | Refills: 3 | Status: SHIPPED | OUTPATIENT
Start: 2021-01-04 | End: 2021-04-27

## 2021-01-04 RX ORDER — PANTOPRAZOLE SODIUM 40 MG/1
40 TABLET, DELAYED RELEASE ORAL DAILY
Qty: 30 TABLET | Refills: 3 | OUTPATIENT
Start: 2021-01-04

## 2021-01-06 ENCOUNTER — OFFICE VISIT (OUTPATIENT)
Dept: FAMILY MEDICINE CLINIC | Age: 86
End: 2021-01-06
Payer: MEDICARE

## 2021-01-06 VITALS
SYSTOLIC BLOOD PRESSURE: 127 MMHG | HEIGHT: 65 IN | DIASTOLIC BLOOD PRESSURE: 72 MMHG | TEMPERATURE: 97.2 F | BODY MASS INDEX: 20.03 KG/M2 | WEIGHT: 120.2 LBS | OXYGEN SATURATION: 98 % | HEART RATE: 53 BPM

## 2021-01-06 DIAGNOSIS — K59.00 CONSTIPATION, UNSPECIFIED CONSTIPATION TYPE: ICD-10-CM

## 2021-01-06 DIAGNOSIS — R41.3 MEMORY PROBLEM: ICD-10-CM

## 2021-01-06 DIAGNOSIS — E83.42 HYPOMAGNESEMIA: ICD-10-CM

## 2021-01-06 DIAGNOSIS — R42 DIZZINESS: ICD-10-CM

## 2021-01-06 DIAGNOSIS — Z09 HOSPITAL DISCHARGE FOLLOW-UP: Primary | ICD-10-CM

## 2021-01-06 DIAGNOSIS — Z23 NEED FOR INFLUENZA VACCINATION: ICD-10-CM

## 2021-01-06 DIAGNOSIS — I10 ESSENTIAL HYPERTENSION: ICD-10-CM

## 2021-01-06 DIAGNOSIS — Z23 NEED FOR PNEUMOCOCCAL VACCINATION: ICD-10-CM

## 2021-01-06 PROCEDURE — 1111F DSCHRG MED/CURRENT MED MERGE: CPT | Performed by: FAMILY MEDICINE

## 2021-01-06 PROCEDURE — 90694 VACC AIIV4 NO PRSRV 0.5ML IM: CPT | Performed by: FAMILY MEDICINE

## 2021-01-06 PROCEDURE — 99495 TRANSJ CARE MGMT MOD F2F 14D: CPT | Performed by: FAMILY MEDICINE

## 2021-01-06 PROCEDURE — G0009 ADMIN PNEUMOCOCCAL VACCINE: HCPCS | Performed by: FAMILY MEDICINE

## 2021-01-06 PROCEDURE — 90732 PPSV23 VACC 2 YRS+ SUBQ/IM: CPT | Performed by: FAMILY MEDICINE

## 2021-01-06 PROCEDURE — G0008 ADMIN INFLUENZA VIRUS VAC: HCPCS | Performed by: FAMILY MEDICINE

## 2021-01-06 RX ORDER — DOCUSATE SODIUM 100 MG/1
100 CAPSULE, LIQUID FILLED ORAL 2 TIMES DAILY
COMMUNITY

## 2021-01-06 RX ORDER — PROMETHAZINE HYDROCHLORIDE 25 MG/1
TABLET ORAL
COMMUNITY
Start: 2020-09-29 | End: 2021-07-14

## 2021-01-06 ASSESSMENT — PATIENT HEALTH QUESTIONNAIRE - PHQ9
SUM OF ALL RESPONSES TO PHQ9 QUESTIONS 1 & 2: 0
SUM OF ALL RESPONSES TO PHQ QUESTIONS 1-9: 0
SUM OF ALL RESPONSES TO PHQ QUESTIONS 1-9: 0

## 2021-01-06 NOTE — PROGRESS NOTES
Post-Discharge Transitional Care Management Services or Hospital Follow Up      Marcin Arndt   YOB: 1935    Date of Office Visit:  1/6/2021  Date of Hospital Admission: 11-29-21  Date of Hospital Discharge: 1-6-21    Care management risk score Rising risk (score 2-5) and Complex Care (Scores >=6): 0     Non face to face  following discharge, date last encounter closed (first attempt may have been earlier): *No documented post hospital discharge outreach found in the last 14 days *No documented post hospital discharge outreach found in the last 14 days    Call initiated 2 business days of discharge: *No response recorded in the last 14 days    There is no problem list on file for this patient.       Allergies   Allergen Reactions    Hydrocodone        Medications listed as ordered at the time of discharge from hospital  Yes up to date in chart     Medications marked \"taking\" at this time  Outpatient Medications Marked as Taking for the 1/6/21 encounter (Office Visit) with Jenni Ganser, DO   Medication Sig Dispense Refill    metoprolol tartrate (LOPRESSOR) 25 MG tablet TAKE ONE TABLET BY MOUTH TWICE DAILY but hold DOSE if heart rate <60 AND blood pressure <120      docusate sodium (COLACE) 100 MG capsule Take 100 mg by mouth 2 times daily      promethazine (PHENERGAN) 25 MG tablet take 1 tablet by mouth three times a day 30 MINUTES PRIOR TO MEALS for 10 days      pantoprazole (PROTONIX) 40 MG tablet take 1 tablet by mouth once daily 30 tablet 3    polyethylene glycol (GLYCOLAX) 17 GM/SCOOP powder Take 17 g by mouth daily as needed (Constiptation) 1530 g 1    simvastatin (ZOCOR) 10 MG tablet take 1 tablet by mouth nightly 30 tablet 3    levothyroxine (SYNTHROID) 75 MCG tablet take 1 tablet by mouth once daily 30 tablet 3    hydrocortisone (ANUSOL-HC) 2.5 % CREA rectal cream Apply qid prn hemmorroids 54 g 5    apixaban (ELIQUIS) 2.5 MG TABS tablet Take 1 tablet by mouth 2 times daily 60 tablet 3    lipase-protease-amylase (CREON) 85697 units delayed release capsule Take 1 capsule by mouth 3 times daily (with meals) 270 capsule 3    Magnesium Oxide 250 MG TABS Take 400 mg by mouth 2 times daily           Medications patient taking as of now reconciled against medications ordered at time of hospital discharge: Yes    Chief Complaint   Patient presents with    Follow-Up from Hospital       History of Present illness - Follow up of Hospital diagnosis(es): She was admitted for chest pain nausea vomiting and was found to be Covid positive    Inpatient course: Discharge summary reviewed- see chart. Interval history/Current status: doing ok   Will need helpwith meds and day to day living   cannnot cook meals or bath    Vitals:    01/06/21 1104   BP: 127/72   Pulse: 53   Temp: 97.2 °F (36.2 °C)   SpO2: 98%   Weight: 120 lb 3.2 oz (54.5 kg)   Height: 5' 5.05\" (1.652 m)     Body mass index is 19.97 kg/m². Wt Readings from Last 3 Encounters:   01/06/21 120 lb 3.2 oz (54.5 kg)   08/28/20 130 lb 3.2 oz (59.1 kg)   07/31/20 129 lb 6.4 oz (58.7 kg)     BP Readings from Last 3 Encounters:   01/06/21 127/72   08/28/20 (!) 170/68   07/31/20 129/62       A comprehensive review of systems was negative except for what was noted in the HPI.     Physical Exam:  General Appearance: alert and oriented to person, place and time, well developed and well- nourished, in no acute distress  Skin: warm and dry, no rash or erythema  Head: normocephalic and atraumatic  Eyes: pupils equal, round, and reactive to light, extraocular eye movements intact, conjunctivae normal  ENT: tympanic membrane, external ear and ear canal normal bilaterally, nose without deformity, nasal mucosa and turbinates normal without polyps  Neck: supple and non-tender without mass, no thyromegaly or thyroid nodules, no cervical lymphadenopathy  Pulmonary/Chest: clear to auscultation bilaterally- no wheezes, rales or rhonchi, normal air movement, no

## 2021-01-15 ENCOUNTER — TELEPHONE (OUTPATIENT)
Dept: FAMILY MEDICINE CLINIC | Age: 86
End: 2021-01-15

## 2021-01-18 ENCOUNTER — TELEPHONE (OUTPATIENT)
Dept: FAMILY MEDICINE CLINIC | Age: 86
End: 2021-01-18

## 2021-01-18 ENCOUNTER — NURSE TRIAGE (OUTPATIENT)
Dept: OTHER | Facility: CLINIC | Age: 86
End: 2021-01-18

## 2021-01-18 NOTE — TELEPHONE ENCOUNTER
Reason for Disposition   Information only question and nurse able to answer    Protocols used: INFORMATION ONLY CALL - NO TRIAGE-ADULT-[place]    Wanted to know how long to wait to get the COVID vaccine after received the flu shot. Writer gave her information found on the CDC web site to wait 14 days after receiving her flu shot.

## 2021-02-15 NOTE — TELEPHONE ENCOUNTER
Lloyd Huerta is calling to request a refill on the following medication(s):    Last Visit Date (If Applicable):  8/0/3784    Next Visit Date:    Visit date not found    Medication Request:  Requested Prescriptions     Pending Prescriptions Disp Refills    apixaban (ELIQUIS) 2.5 MG TABS tablet 60 tablet 3     Sig: Take 1 tablet by mouth 2 times daily

## 2021-02-15 NOTE — TELEPHONE ENCOUNTER
Salvatore Dc is calling to request a refill on the following medication(s):    Last Visit Date (If Applicable):  3/8/5963    Next Visit Date:    Visit date not found    Medication Request:  Requested Prescriptions     Pending Prescriptions Disp Refills    ELIQUIS 2.5 MG TABS tablet [Pharmacy Med Name: ELIQUIS 2.5 MG TABLET] 60 tablet 3     Sig: take 1 tablet by mouth twice a day

## 2021-02-16 RX ORDER — APIXABAN 2.5 MG/1
TABLET, FILM COATED ORAL
Qty: 60 TABLET | Refills: 3 | Status: ON HOLD | OUTPATIENT
Start: 2021-02-16 | End: 2021-05-18 | Stop reason: HOSPADM

## 2021-02-22 RX ORDER — HYDROCORTISONE 25 MG/G
CREAM TOPICAL
Qty: 56 G | Refills: 5 | Status: SHIPPED | OUTPATIENT
Start: 2021-02-22 | End: 2021-09-05

## 2021-02-25 ENCOUNTER — TELEPHONE (OUTPATIENT)
Dept: FAMILY MEDICINE CLINIC | Age: 86
End: 2021-02-25

## 2021-02-25 DIAGNOSIS — R30.0 DYSURIA: Primary | ICD-10-CM

## 2021-02-25 RX ORDER — OXYBUTYNIN CHLORIDE 10 MG/1
10 TABLET, EXTENDED RELEASE ORAL DAILY
Qty: 30 TABLET | Refills: 3 | Status: SHIPPED | OUTPATIENT
Start: 2021-02-25

## 2021-02-25 NOTE — TELEPHONE ENCOUNTER
I called patient she states that incontinence has been going on for a 3 weeks. Patient denies any burning with urination.

## 2021-03-22 RX ORDER — LEVOTHYROXINE SODIUM 0.07 MG/1
TABLET ORAL
Qty: 30 TABLET | Refills: 3 | Status: SHIPPED | OUTPATIENT
Start: 2021-03-22 | End: 2021-07-01

## 2021-03-22 NOTE — TELEPHONE ENCOUNTER
Neto Jerez is calling to request a refill on the following medication(s):    Last Visit Date (If Applicable):  8/7/3325    Next Visit Date:    Visit date not found    Medication Request:  Requested Prescriptions     Pending Prescriptions Disp Refills    levothyroxine (SYNTHROID) 75 MCG tablet [Pharmacy Med Name: LEVOTHYROXINE 75 MCG TABLET] 30 tablet 3     Sig: take 1 tablet by mouth once daily

## 2021-04-08 ENCOUNTER — PATIENT MESSAGE (OUTPATIENT)
Dept: FAMILY MEDICINE CLINIC | Age: 86
End: 2021-04-08

## 2021-04-09 NOTE — TELEPHONE ENCOUNTER
From: Sariah Church  To: Shad Brumfield DO  Sent: 4/8/2021 4:51 PM EDT  Subject: Non-Urgent Medical Question    Is there an ENT Dr you recommend?  The bump on my nose is getting bigger and red

## 2021-04-12 DIAGNOSIS — L98.9 EXTERNAL NASAL LESION: Primary | ICD-10-CM

## 2021-04-14 ENCOUNTER — OFFICE VISIT (OUTPATIENT)
Dept: FAMILY MEDICINE CLINIC | Age: 86
End: 2021-04-14
Payer: MEDICARE

## 2021-04-14 VITALS
DIASTOLIC BLOOD PRESSURE: 65 MMHG | SYSTOLIC BLOOD PRESSURE: 136 MMHG | BODY MASS INDEX: 21.36 KG/M2 | OXYGEN SATURATION: 98 % | HEIGHT: 65 IN | HEART RATE: 50 BPM | WEIGHT: 128.2 LBS

## 2021-04-14 DIAGNOSIS — E03.9 ACQUIRED HYPOTHYROIDISM: ICD-10-CM

## 2021-04-14 DIAGNOSIS — R41.3 MEMORY PROBLEM: ICD-10-CM

## 2021-04-14 DIAGNOSIS — J34.89 LESION OF NOSE: ICD-10-CM

## 2021-04-14 DIAGNOSIS — E87.1 HYPONATREMIA: ICD-10-CM

## 2021-04-14 DIAGNOSIS — I10 ESSENTIAL HYPERTENSION: Primary | ICD-10-CM

## 2021-04-14 DIAGNOSIS — N32.81 OAB (OVERACTIVE BLADDER): ICD-10-CM

## 2021-04-14 PROCEDURE — G8427 DOCREV CUR MEDS BY ELIG CLIN: HCPCS | Performed by: FAMILY MEDICINE

## 2021-04-14 PROCEDURE — 4040F PNEUMOC VAC/ADMIN/RCVD: CPT | Performed by: FAMILY MEDICINE

## 2021-04-14 PROCEDURE — G8399 PT W/DXA RESULTS DOCUMENT: HCPCS | Performed by: FAMILY MEDICINE

## 2021-04-14 PROCEDURE — 1036F TOBACCO NON-USER: CPT | Performed by: FAMILY MEDICINE

## 2021-04-14 PROCEDURE — 1090F PRES/ABSN URINE INCON ASSESS: CPT | Performed by: FAMILY MEDICINE

## 2021-04-14 PROCEDURE — G8420 CALC BMI NORM PARAMETERS: HCPCS | Performed by: FAMILY MEDICINE

## 2021-04-14 PROCEDURE — 1123F ACP DISCUSS/DSCN MKR DOCD: CPT | Performed by: FAMILY MEDICINE

## 2021-04-14 PROCEDURE — 99214 OFFICE O/P EST MOD 30 MIN: CPT | Performed by: FAMILY MEDICINE

## 2021-04-14 RX ORDER — DONEPEZIL HYDROCHLORIDE 10 MG/1
10 TABLET, FILM COATED ORAL NIGHTLY
Qty: 90 TABLET | Refills: 1 | Status: SHIPPED | OUTPATIENT
Start: 2021-04-14 | End: 2021-05-11 | Stop reason: SINTOL

## 2021-04-14 RX ORDER — SIMVASTATIN 10 MG
TABLET ORAL
Qty: 90 TABLET | Refills: 3 | Status: SHIPPED | OUTPATIENT
Start: 2021-04-14

## 2021-04-14 NOTE — PROGRESS NOTES
[address] [facility] MEDICINE  [facility] [address] [facility] [address]  Dept: [phone]      Ángel Vasquez is a 82 y.o. female who presents today for follow up on her  medical conditions as noted below. Chief Complaint   Patient presents with    Medication Check    Memory Loss    Hemorrhoids       There is no problem list on file for this patient. History reviewed. No pertinent past medical history. History reviewed. No pertinent surgical history. History reviewed. No pertinent family history.     Current Outpatient Medications   Medication Sig Dispense Refill    mirabegron (MYRBETRIQ) 50 MG TB24 Take 50 mg by mouth daily 90 tablet 3    donepezil (ARICEPT) 10 MG tablet Take 1 tablet by mouth nightly 90 tablet 1    levothyroxine (SYNTHROID) 75 MCG tablet take 1 tablet by mouth once daily 30 tablet 3    oxybutynin (DITROPAN XL) 10 MG extended release tablet Take 1 tablet by mouth daily 30 tablet 3    hydrocortisone (PROCTO-MED HC) 2.5 % CREA rectal cream apply 1 applicatorful rectally four times a day if needed for HEMORRHOIDS 56 g 5    ELIQUIS 2.5 MG TABS tablet take 1 tablet by mouth twice a day 60 tablet 3    apixaban (ELIQUIS) 2.5 MG TABS tablet Take 1 tablet by mouth 2 times daily 60 tablet 3    metoprolol tartrate (LOPRESSOR) 25 MG tablet TAKE ONE TABLET BY MOUTH TWICE DAILY but hold DOSE if heart rate <60 AND blood pressure <120      docusate sodium (COLACE) 100 MG capsule Take 100 mg by mouth 2 times daily      promethazine (PHENERGAN) 25 MG tablet take 1 tablet by mouth three times a day 30 MINUTES PRIOR TO MEALS for 10 days      pantoprazole (PROTONIX) 40 MG tablet take 1 tablet by mouth once daily 30 tablet 3    simvastatin (ZOCOR) 10 MG tablet take 1 tablet by mouth nightly 30 tablet 3    lipase-protease-amylase (CREON) 75245 units delayed release capsule Take 1 capsule by mouth 3 times daily (with meals) 270 capsule 3    Normal range of motion. She exhibits no edema or tenderness. Lymphadenopathy: She has no cervical adenopathy. Neurological: She is alert and oriented to person, place, and time. She has normal reflexes. Skin: Skin is warm and dry. No rash noted. Psychiatric: She has a normal mood and affect. Her   behavior is normal.       Assessment:      1. Essential hypertension    2. Memory problem    3. Lesion of nose    4. Hyponatremia    5. OAB (overactive bladder)    6. Acquired hypothyroidism          Plan:      Call or return to clinic prn if these symptoms worsen or fail to improve as anticipated. I have reviewed the instructions with the patient, answering all questions to her satisfaction. No follow-ups on file.   Orders Placed This Encounter   Procedures    T4, Free     Standing Status:   Future     Standing Expiration Date:   10/14/2021    TSH without Reflex     Standing Status:   Future     Standing Expiration Date:   10/14/2021    Comprehensive Metabolic Panel     Standing Status:   Future     Standing Expiration Date:   10/14/2021    HEMALATHA - Trenton Coffey MD, Otolaryngology, [facility]     Referral Priority:   Routine     Referral Type:   Eval and Treat     Referral Reason:   Specialty Services Required     Referred to Provider:   Arias Coleman MD     Requested Specialty:   Otolaryngology     Number of Visits Requested:   [address] [facility] Dale Parikh MD, Neurology, [facility]     Referral Priority:   Routine     Referral Type:   Eval and Treat     Referral Reason:   Specialty Services Required     Referred to Provider:   Nichole Merlos MD     Requested Specialty:   Neurology     Number of Visits Requested:   1     Orders Placed This Encounter   Medications    mirabegron (MYRBETRIQ) 50 MG TB24     Sig: Take 50 mg by mouth daily     Dispense:  90 tablet     Refill:  3    donepezil (ARICEPT) 10 MG tablet     Sig: Take 1 tablet by mouth nightly     Dispense:  90 tablet     Refill:  1   Get the above testing see specialist and try other meds    Electronically signed by Martin Hudson DO on 4/14/2021 at 4:56 PM

## 2021-04-16 ENCOUNTER — HOSPITAL ENCOUNTER (OUTPATIENT)
Age: 86
Setting detail: SPECIMEN
Discharge: HOME | End: 2021-04-16
Payer: MEDICARE

## 2021-04-16 DIAGNOSIS — I10 ESSENTIAL HYPERTENSION: ICD-10-CM

## 2021-04-16 DIAGNOSIS — R42 DIZZINESS: ICD-10-CM

## 2021-04-16 DIAGNOSIS — E83.42 HYPOMAGNESEMIA: ICD-10-CM

## 2021-04-16 DIAGNOSIS — E03.9 ACQUIRED HYPOTHYROIDISM: ICD-10-CM

## 2021-04-16 DIAGNOSIS — E87.1 HYPONATREMIA: ICD-10-CM

## 2021-04-16 DIAGNOSIS — K59.00 CONSTIPATION, UNSPECIFIED CONSTIPATION TYPE: ICD-10-CM

## 2021-04-16 DIAGNOSIS — R41.3 MEMORY PROBLEM: ICD-10-CM

## 2021-04-16 LAB
ALBUMIN SERPL-MCNC: 4.1 G/DL (ref 3.5–5.2)
ALBUMIN/GLOBULIN RATIO: 1.3 (ref 1–2.5)
ALP BLD-CCNC: 76 U/L (ref 35–104)
ALT SERPL-CCNC: 13 U/L (ref 5–33)
ANION GAP SERPL CALCULATED.3IONS-SCNC: 9 MMOL/L (ref 9–17)
AST SERPL-CCNC: 19 U/L
BILIRUB SERPL-MCNC: 0.29 MG/DL (ref 0.3–1.2)
BILIRUBIN DIRECT: 0.09 MG/DL
BILIRUBIN, INDIRECT: 0.2 MG/DL (ref 0–1)
BUN BLDV-MCNC: 12 MG/DL (ref 8–23)
CALCIUM SERPL-MCNC: 9 MG/DL (ref 8.6–10.4)
CHLORIDE BLD-SCNC: 104 MMOL/L (ref 98–107)
CO2: 26 MMOL/L (ref 20–31)
CREAT SERPL-MCNC: 0.89 MG/DL (ref 0.5–0.9)
GFR AFRICAN AMERICAN: >60 ML/MIN
GFR NON-AFRICAN AMERICAN: >60 ML/MIN
GFR SERPL CREATININE-BSD FRML MDRD: ABNORMAL ML/MIN/{1.73_M2}
GFR SERPL CREATININE-BSD FRML MDRD: ABNORMAL ML/MIN/{1.73_M2}
GLUCOSE BLD-MCNC: 93 MG/DL (ref 70–99)
MAGNESIUM: 2.1 MG/DL (ref 1.6–2.6)
POTASSIUM SERPL-SCNC: 4.3 MMOL/L (ref 3.7–5.3)
SODIUM BLD-SCNC: 139 MMOL/L (ref 135–144)
THYROXINE, FREE: 1.35 NG/DL (ref 0.93–1.7)
TOTAL PROTEIN: 7.3 G/DL (ref 6.4–8.3)
TSH SERPL DL<=0.05 MIU/L-ACNC: 3.24 MIU/L (ref 0.3–5)

## 2021-04-21 ENCOUNTER — PATIENT MESSAGE (OUTPATIENT)
Dept: FAMILY MEDICINE CLINIC | Age: 86
End: 2021-04-21

## 2021-04-21 NOTE — TELEPHONE ENCOUNTER
From: Elia Richard  To: Anju Lux DO  Sent: 4/21/2021 1:46 PM EDT  Subject: Test Results Question    Looks like moms bilirubin is low. Is that an issue? Are there any more concerns from the bloodwork?

## 2021-04-27 ENCOUNTER — OFFICE VISIT (OUTPATIENT)
Dept: NEUROLOGY | Age: 86
End: 2021-04-27
Payer: MEDICARE

## 2021-04-27 VITALS
BODY MASS INDEX: 20.16 KG/M2 | WEIGHT: 121 LBS | SYSTOLIC BLOOD PRESSURE: 123 MMHG | HEIGHT: 65 IN | HEART RATE: 60 BPM | DIASTOLIC BLOOD PRESSURE: 60 MMHG

## 2021-04-27 DIAGNOSIS — G93.49 ENCEPHALOPATHY DUE TO COVID-19 VIRUS: ICD-10-CM

## 2021-04-27 DIAGNOSIS — U07.1 ENCEPHALOPATHY DUE TO COVID-19 VIRUS: ICD-10-CM

## 2021-04-27 DIAGNOSIS — R41.3 MEMORY LOSS: Primary | ICD-10-CM

## 2021-04-27 PROCEDURE — 1036F TOBACCO NON-USER: CPT | Performed by: PSYCHIATRY & NEUROLOGY

## 2021-04-27 PROCEDURE — G8399 PT W/DXA RESULTS DOCUMENT: HCPCS | Performed by: PSYCHIATRY & NEUROLOGY

## 2021-04-27 PROCEDURE — 99204 OFFICE O/P NEW MOD 45 MIN: CPT | Performed by: PSYCHIATRY & NEUROLOGY

## 2021-04-27 PROCEDURE — 1123F ACP DISCUSS/DSCN MKR DOCD: CPT | Performed by: PSYCHIATRY & NEUROLOGY

## 2021-04-27 PROCEDURE — 1090F PRES/ABSN URINE INCON ASSESS: CPT | Performed by: PSYCHIATRY & NEUROLOGY

## 2021-04-27 PROCEDURE — G8427 DOCREV CUR MEDS BY ELIG CLIN: HCPCS | Performed by: PSYCHIATRY & NEUROLOGY

## 2021-04-27 PROCEDURE — G8420 CALC BMI NORM PARAMETERS: HCPCS | Performed by: PSYCHIATRY & NEUROLOGY

## 2021-04-27 PROCEDURE — 4040F PNEUMOC VAC/ADMIN/RCVD: CPT | Performed by: PSYCHIATRY & NEUROLOGY

## 2021-04-27 RX ORDER — PANTOPRAZOLE SODIUM 40 MG/1
TABLET, DELAYED RELEASE ORAL
Qty: 30 TABLET | Refills: 3 | Status: SHIPPED | OUTPATIENT
Start: 2021-04-27 | End: 2021-08-18

## 2021-04-27 ASSESSMENT — ENCOUNTER SYMPTOMS
GASTROINTESTINAL NEGATIVE: 1
EYES NEGATIVE: 1
ALLERGIC/IMMUNOLOGIC NEGATIVE: 1
RESPIRATORY NEGATIVE: 1

## 2021-04-27 NOTE — PROGRESS NOTES
83 yo wf with memory complaints. There has been short term memory which became an issue since she got COVID in thanksgiving . She was admitted to [facility] with nausea and vomiting going to rehabilitation for one day. Her  daughter reports that once coming home she was weak having syncopal event along with memory complaints readmitted to [facility] for 5 days. She reports that memory complaints have persisted asking the same thing constantly or telling same story . She was placed on aricept unable to tolerate havng nigtmares . She lives in independent ling at [facility] ambulating with walker with baseline imbalance  . She is provided one meal per day . She will fix sandwich or family bring meals for her . Her sister will do her checkbook unable to process. Before COVID there was mild forgetfulness . She has not driven for one year having erratic driving not getting lost . She is not taking showers because of fear of falling . Significant medications previously on aricept . Testing TSH normal      History reviewed. No pertinent past medical history. History reviewed. No pertinent surgical history. History reviewed. No pertinent family history.     Social History     Socioeconomic History    Marital status:      Spouse name: None    Number of children: None    Years of education: None    Highest education level: None   Occupational History    None   Social Needs    Financial resource strain: Not hard at all   [employer] insecurity     Worry: Never true     Inability: Never true   [employer] [facility] needs     Medical: None     Non-medical: None   Tobacco Use    Smoking status: Never Smoker    Smokeless tobacco: Never Used   Substance and Sexual Activity    Alcohol use: Yes     Comment: rare    Drug use: Never    Sexual activity: Not Currently   Lifestyle    Physical activity     Days per week: None     Minutes per session: None    Stress: None   Relationships    Social connections     Talks on phone: None     Gets together: None     Attends Tenri[employer] service: None     Active member of club or organization: None     Attends meetings of clubs or organizations: None     Relationship status: None    Intimate partner violence     Fear of current or ex partner: None     Emotionally abused: None     Physically abused: None     Forced sexual activity: None   Other Topics Concern    None   Social History Narrative    None       Current Outpatient Medications   Medication Sig Dispense Refill    pantoprazole (PROTONIX) 40 MG tablet take 1 tablet by mouth once daily 30 tablet 3    Cholecalciferol (VITAMIN D3 PO) Take by mouth daily      Cyanocobalamin (VITAMIN B-12 PO) Take by mouth daily      simvastatin (ZOCOR) 10 MG tablet take 1 tablet by mouth every evening 90 tablet 3    levothyroxine (SYNTHROID) 75 MCG tablet take 1 tablet by mouth once daily 30 tablet 3    hydrocortisone (PROCTO-MED HC) 2.5 % CREA rectal cream apply 1 applicatorful rectally four times a day if needed for HEMORRHOIDS 56 g 5    ELIQUIS 2.5 MG TABS tablet take 1 tablet by mouth twice a day 60 tablet 3    apixaban (ELIQUIS) 2.5 MG TABS tablet Take 1 tablet by mouth 2 times daily 60 tablet 3    docusate sodium (COLACE) 100 MG capsule Take 100 mg by mouth 2 times daily      promethazine (PHENERGAN) 25 MG tablet take 1 tablet by mouth three times a day 30 MINUTES PRIOR TO MEALS for 10 days      nystatin (MYCOSTATIN) 714648 UNIT/GM cream Apply topically 2 times daily.  1 Tube 2    lipase-protease-amylase (CREON) 50704 units delayed release capsule Take 1 capsule by mouth 3 times daily (with meals) 270 capsule 3    Magnesium Oxide 250 MG TABS Take 400 mg by mouth 2 times daily       donepezil (ARICEPT) 10 MG tablet Take 1 tablet by mouth nightly (Patient not taking: Reported on 4/27/2021) 90 tablet 1    oxybutynin (DITROPAN XL) 10 MG extended release tablet Take 1 tablet by mouth daily (Patient not taking: Reported on 4/27/2021) 30 tablet 3     No current facility-administered medications for this visit. Allergies   Allergen Reactions    Percocet [Oxycodone-Acetaminophen]       Woozy    Hydrocodone          Review of Systems     Vitals:    04/27/21 1428   BP: 123/60   Pulse: 60     weight: 121 lb (54.9 kg)      Review of Systems   Constitutional: Negative. HENT: Negative. Eyes: Negative. Respiratory: Negative. Cardiovascular: Negative. Gastrointestinal: Negative. Endocrine: Negative. Genitourinary: Negative. Musculoskeletal: Positive for gait problem. Skin: Negative. Allergic/Immunologic: Negative. Hematological: Negative. Psychiatric/Behavioral: Positive for decreased concentration. Neurological Examination  Constitutional .General exam well groomed   Head/Ears /Nose/Throat: external ear . Normal exam  Neck and thyroid . Normal size. No bruits  Respiratory . Breathsounds clear bilaterally  Cardiovascular: Auscultation of heart with regular rate and rhythm  Musculoskeletal. Muscle bulk and tone normal                                                           Muscle strength 5/5 strength throughout                                                                                No dysmetria or dysdiadokinesis  No tremor   Normal fine motor  Gait mild apraxia  Orientation Alert and oriented x 3 . Tuesday April 27 , 2021 . WORLD able to spell forwards and backwards . Serial 7 to 86   Attention and concentration normal  Short term memory 3 words out of 3 in one minute   Language process and speech normal . No aphasia  Cranial nerve 2 normal acuety and visual fields  Cranial nerve 3, 4 and 6 . Extraocular muscles are intact . Pupils are equal and reactive   Cranial nerve 5 . Intact corneal reflex. Normal facial sensation  Cranial nerve 7 normal exam   Cranial nerve 8. Grossly intact hearing   Cranial nerve 9 and 10.  Symmetric palate elevation   Cranial nerve 11 , 5 out of 5 strength   Cranial Nerve 12 midline tongue . No atrophy  Sensation . Normal pinprick and light touch   Deep Tendon Reflexes normal  Plantar response flexor bilaterally    ASSESSMENT/PLAN      Diagnosis Orders   1. Memory loss  MRI BRAIN WO CONTRAST    EEG    Vitamin B12    Folate    RPR Reflex to Titer and TPPA    Sedimentation rate, automated   2.  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  MRI BRAIN WO CONTRAST    EEG   Patient is felt to have COVID encephalopathy to undergo MRI of Head , EEG along with bloodwork     Orders Placed This Encounter   Procedures    MRI BRAIN WO CONTRAST     Standing Status:   Future     Standing Expiration Date:   4/27/2022    Vitamin B12     Standing Status:   Future     Standing Expiration Date:   4/27/2022    Folate     Standing Status:   Future     Standing Expiration Date:   4/27/2022    RPR Reflex to Titer and TPPA     Standing Status:   Future     Standing Expiration Date:   4/27/2022    Sedimentation rate, automated     Standing Status:   Future     Standing Expiration Date:   4/27/2022    EEG     Standing Status:   Future     Standing Expiration Date:   4/27/2022

## 2021-04-30 ENCOUNTER — TELEPHONE (OUTPATIENT)
Dept: PRIMARY CARE CLINIC | Age: 86
End: 2021-04-30

## 2021-04-30 ENCOUNTER — NURSE TRIAGE (OUTPATIENT)
Dept: OTHER | Facility: CLINIC | Age: 86
End: 2021-04-30

## 2021-04-30 NOTE — TELEPHONE ENCOUNTER
Pt daughter called stating that they pt has been having nausea on and off x 2 weeks, with decrease in appetite, fatigue, lethargy, and pt daughter is worries about dehydration. Writer advised pt daughter to have pt be seen at [address] and information was given. Pt daughter verbalized understanding.

## 2021-05-04 ENCOUNTER — TELEPHONE (OUTPATIENT)
Dept: FAMILY MEDICINE CLINIC | Age: 86
End: 2021-05-04

## 2021-05-06 ENCOUNTER — HOSPITAL ENCOUNTER (OUTPATIENT)
Dept: NEUROLOGY | Age: 86
Discharge: HOME | End: 2021-05-06
Payer: MEDICARE

## 2021-05-06 DIAGNOSIS — R41.3 MEMORY LOSS: ICD-10-CM

## 2021-05-06 DIAGNOSIS — G93.49 ENCEPHALOPATHY DUE TO COVID-19 VIRUS: ICD-10-CM

## 2021-05-06 DIAGNOSIS — U07.1 ENCEPHALOPATHY DUE TO COVID-19 VIRUS: ICD-10-CM

## 2021-05-06 PROCEDURE — 95816 EEG AWAKE AND DROWSY: CPT | Performed by: PSYCHIATRY & NEUROLOGY

## 2021-05-06 PROCEDURE — 95816 EEG AWAKE AND DROWSY: CPT

## 2021-05-06 NOTE — PROCEDURES
[address]                 [facility], [address]                          ELECTROENCEPHALOGRAM REPORT    PATIENT NAME: Priyanka Geronimo                     :        1935  MED REC NO:   [id number]                              ROOM:  ACCOUNT NO:   [de-identified]                           ADMIT DATE: 2021  PROVIDER:     David Durham MD    DATE OF EE2021    ATTENDING OF RECORD:  Marva Mcgee DO    REASON FOR STUDY:  This is an 79-year-old lady with memory complaints,  history of COVID encephalopathy. MEDICATIONS:  Include Protonix, Aricept, Zocor, Synthroid, Ditropan,  Eliquis. EEG FINDINGS:  This is a 16-channel EEG performed in a patient described  to be awake and drowsy. The patient shows normal waking rhythms. Background activity consists of well-regulated 9 Hz activity in the  40-60 microvolt range more prominent over the posterior head areas  showing some reactivity to eye opening and closing. Over the anterior  head regions, there are 15-20 Hz activity in the 20-30 microvolt range. With drowsiness, there is further intrusion of slower frequencies in the  theta and lesser degree in the delta band. This record is not  lateralized or epileptiform. Hyperventilation not performed. Photic  stimulation shows no change in the record. IMPRESSION:  This EEG is within normal limits for an awake and drowsy  patient. No lateralized or epileptiform disturbance is seen.         Allie Sweeney MD    D: 2021 14:20:53       T: 2021 14:29:20     [email or web address]  Job#: [id number]     Doc#: [id number]    CC:

## 2021-05-07 ENCOUNTER — TELEPHONE (OUTPATIENT)
Dept: FAMILY MEDICINE CLINIC | Age: 86
End: 2021-05-07

## 2021-05-11 ENCOUNTER — TELEMEDICINE (OUTPATIENT)
Dept: FAMILY MEDICINE CLINIC | Age: 86
End: 2021-05-11
Payer: MEDICARE

## 2021-05-11 DIAGNOSIS — K86.2 PANCREATIC CYST: Primary | ICD-10-CM

## 2021-05-11 DIAGNOSIS — R11.0 CHRONIC NAUSEA: ICD-10-CM

## 2021-05-11 PROCEDURE — G8420 CALC BMI NORM PARAMETERS: HCPCS | Performed by: FAMILY MEDICINE

## 2021-05-11 PROCEDURE — 1036F TOBACCO NON-USER: CPT | Performed by: FAMILY MEDICINE

## 2021-05-11 PROCEDURE — 99214 OFFICE O/P EST MOD 30 MIN: CPT | Performed by: FAMILY MEDICINE

## 2021-05-11 PROCEDURE — G8427 DOCREV CUR MEDS BY ELIG CLIN: HCPCS | Performed by: FAMILY MEDICINE

## 2021-05-11 PROCEDURE — 4040F PNEUMOC VAC/ADMIN/RCVD: CPT | Performed by: FAMILY MEDICINE

## 2021-05-11 PROCEDURE — G8399 PT W/DXA RESULTS DOCUMENT: HCPCS | Performed by: FAMILY MEDICINE

## 2021-05-11 PROCEDURE — 1123F ACP DISCUSS/DSCN MKR DOCD: CPT | Performed by: FAMILY MEDICINE

## 2021-05-11 PROCEDURE — 1090F PRES/ABSN URINE INCON ASSESS: CPT | Performed by: FAMILY MEDICINE

## 2021-05-11 NOTE — PROGRESS NOTES
2021    TELEHEALTH EVALUATION -- Audio/Visual (During WAM-75 public health emergency)    HPI:    Prachi Stephen (:  1935) has requested an audio/video evaluation for the following concern(s):    She is being seen today for follow-up on ongoing chronic nausea apparently she has had stomach issues her whole life according to her daughter but for whatever reason this nausea she is having now she wants something done about it she is on Protonix 40 mg twice a day apparently years ago she had a pancreatic cyst.  I recently ran some lab work on her that was normal other than she has a low sodium. She avoids NSAIDs she does easily eat a mild diet she has stopped taking Aricept which does cause nausea in the morning when she gets up is when her nausea is the worst she usually eats a bowl of cereal that does not seem to change her nausea at all and she does not drink coffee she has not seen a gastroenterologist in [facility] and has not seen one for years. Review of Systems    Prior to Visit Medications    Medication Sig Taking?  Authorizing Provider   pantoprazole (PROTONIX) 40 MG tablet take 1 tablet by mouth once daily  Caridad Nova,    Cholecalciferol (VITAMIN D3 PO) Take by mouth daily  Historical Provider, MD   Cyanocobalamin (VITAMIN B-12 PO) Take by mouth daily  Historical Provider, MD   simvastatin (ZOCOR) 10 MG tablet take 1 tablet by mouth every evening  Caridad Nova DO   levothyroxine (SYNTHROID) 75 MCG tablet take 1 tablet by mouth once daily  Caridad Nova DO   oxybutynin (DITROPAN XL) 10 MG extended release tablet Take 1 tablet by mouth daily  Patient not taking: Reported on 2021  Caridad Nova DO   hydrocortisone (PROCTO-MED HC) 2.5 % CREA rectal cream apply 1 applicatorful rectally four times a day if needed for HEMORRHOIDS  Caridad Nova DO   ELIQUIS 2.5 MG TABS tablet take 1 tablet by mouth twice a day  Caridad Nova DO   apixaban (ELIQUIS) 2.5 MG TABS tablet Take 1 tablet by mouth 2 times daily  Caridad Nova DO   docusate sodium (COLACE) 100 MG capsule Take 100 mg by mouth 2 times daily  Historical Provider, MD   promethazine (PHENERGAN) 25 MG tablet take 1 tablet by mouth three times a day 30 MINUTES PRIOR TO MEALS for 10 days  Historical Provider, MD   nystatin (MYCOSTATIN) 265348 UNIT/GM cream Apply topically 2 times daily. Caridad Nova DO   lipase-protease-amylase (CREON) 95860 units delayed release capsule Take 1 capsule by mouth 3 times daily (with meals)  Caridad Nova DO   Magnesium Oxide 250 MG TABS Take 400 mg by mouth 2 times daily   Historical Provider, MD       Social History     Tobacco Use    Smoking status: Never Smoker    Smokeless tobacco: Never Used   Substance Use Topics    Alcohol use: Yes     Comment: rare    Drug use: Never        Allergies   Allergen Reactions    Percocet [Oxycodone-Acetaminophen]       Woozy    Hydrocodone    , No past medical history on file., No past surgical history on file.,   Social History     Tobacco Use    Smoking status: Never Smoker    Smokeless tobacco: Never Used   Substance Use Topics    Alcohol use: Yes     Comment: rare    Drug use: Never   , No family history on file. PHYSICAL EXAMINATION:  [ INSTRUCTIONS:  \"[x]\" Indicates a positive item  \"[]\" Indicates a negative item  -- DELETE ALL ITEMS NOT EXAMINED]  Vital Signs: (As obtained by patient/caregiver or practitioner observation)    Blood pressure-  Heart rate-    Respiratory rate-    Temperature-  Pulse oximetry-     Constitutional: [x] Appears well-developed and well-nourished [x] No apparent distress      [] Abnormal-   Mental status  [x] Alert and awake  [x] Oriented to person/place/time [x]Able to follow commands      Eyes:  EOM    [x]  Normal  [] Abnormal-  Sclera  [x]  Normal  [] Abnormal -         Discharge [x]  None visible  [] Abnormal -    HENT:   [x] Normocephalic, atraumatic.   [] Abnormal   [x] Mouth/Throat: Mucous membranes are moist.     External Ears [x] Normal  [] Abnormal-     Neck: [x] No visualized mass     Pulmonary/Chest: [x] Respiratory effort normal.  [x] No visualized signs of difficulty breathing or respiratory distress        [] Abnormal-      Musculoskeletal:   [x] Normal gait with no signs of ataxia         [x] Normal range of motion of neck        [] Abnormal-       Neurological:        [x] No Facial Asymmetry (Cranial nerve 7 motor function) (limited exam to video visit)          [x] No gaze palsy        [] Abnormal-         Skin:        [x] No significant exanthematous lesions or discoloration noted on facial skin         [] Abnormal-            Psychiatric:       [x] Normal Affect [x] No Hallucinations        [] Abnormal-     Other pertinent observable physical exam findings-     ASSESSMENT/PLAN:   Diagnosis Orders   1. Pancreatic cyst  US PANCREAS    [facility], , Gastroenterology, [facility]   2. Chronic [facility]  [facility]    [facility], [facility]      Orders Placed This Encounter   Procedures    US PANCREAS    [address] [place] [address], , Gastroenterology, [facility] Prescriptions      No prescriptions requested or ordered in this encounter       Return if symptoms worsen or fail to improve. Sariah Church is a 82 y.o. female being evaluated by a Virtual Visit (video visit) encounter to address concerns as mentioned above. A caregiver was present when appropriate. Due to this being a TeleHealth encounter (During CJISP-09 public health emergency), evaluation of the following organ systems was limited: Vitals/Constitutional/EENT/Resp/CV/GI//MS/Neuro/Skin/Heme-Lymph-Imm.   Pursuant to the emergency declaration under the [address] [place] [address] authority and the [employer] and [employer] Act, this Virtual Visit was conducted with patient's (and/or legal guardian's) consent, to reduce the patient's risk of exposure to COVID-19

## 2021-05-12 ENCOUNTER — HOSPITAL ENCOUNTER (OUTPATIENT)
Dept: MRI IMAGING | Facility: CLINIC | Age: 86
Discharge: HOME | DRG: 034 | End: 2021-05-14
Payer: MEDICARE

## 2021-05-12 ENCOUNTER — TELEPHONE (OUTPATIENT)
Dept: NEUROLOGY | Age: 86
End: 2021-05-12

## 2021-05-12 ENCOUNTER — HOSPITAL ENCOUNTER (OUTPATIENT)
Age: 86
Setting detail: SPECIMEN
Discharge: HOME | End: 2021-05-12
Payer: MEDICARE

## 2021-05-12 ENCOUNTER — HOSPITAL ENCOUNTER (INPATIENT)
Age: 86
LOS: 6 days | Discharge: HOME HEALTH | DRG: 034 | End: 2021-05-18
Attending: EMERGENCY MEDICINE | Admitting: PSYCHIATRY & NEUROLOGY
Payer: MEDICARE

## 2021-05-12 ENCOUNTER — APPOINTMENT (OUTPATIENT)
Dept: CT IMAGING | Age: 86
DRG: 034 | End: 2021-05-12
Payer: MEDICARE

## 2021-05-12 DIAGNOSIS — I63.9 CEREBROVASCULAR ACCIDENT (CVA), UNSPECIFIED MECHANISM (HCC): Primary | ICD-10-CM

## 2021-05-12 DIAGNOSIS — G93.49 ENCEPHALOPATHY DUE TO COVID-19 VIRUS: ICD-10-CM

## 2021-05-12 DIAGNOSIS — R41.3 MEMORY LOSS: ICD-10-CM

## 2021-05-12 DIAGNOSIS — U07.1 ENCEPHALOPATHY DUE TO COVID-19 VIRUS: ICD-10-CM

## 2021-05-12 LAB
% CKMB: 4 % (ref 0–3)
ABSOLUTE EOS #: 0.08 K/UL (ref 0–0.44)
ABSOLUTE IMMATURE GRANULOCYTE: 0.03 K/UL (ref 0–0.3)
ABSOLUTE LYMPH #: 1.78 K/UL (ref 1.1–3.7)
ABSOLUTE MONO #: 0.79 K/UL (ref 0.1–1.2)
ANION GAP SERPL CALCULATED.3IONS-SCNC: 13 MMOL/L (ref 9–17)
BASOPHILS # BLD: 1 % (ref 0–2)
BASOPHILS ABSOLUTE: 0.04 K/UL (ref 0–0.2)
BUN BLDV-MCNC: 11 MG/DL (ref 8–23)
BUN/CREAT BLD: ABNORMAL (ref 9–20)
CALCIUM SERPL-MCNC: 8.9 MG/DL (ref 8.6–10.4)
CHLORIDE BLD-SCNC: 96 MMOL/L (ref 98–107)
CHOLESTEROL/HDL RATIO: 2
CHOLESTEROL: 123 MG/DL
CK MB: 1 NG/ML
CKMB INTERPRETATION: ABNORMAL
CO2: 24 MMOL/L (ref 20–31)
CREAT SERPL-MCNC: 0.77 MG/DL (ref 0.5–0.9)
DIFFERENTIAL TYPE: ABNORMAL
EOSINOPHILS RELATIVE PERCENT: 1 % (ref 1–4)
GFR AFRICAN AMERICAN: >60 ML/MIN
GFR NON-AFRICAN AMERICAN: >60 ML/MIN
GFR SERPL CREATININE-BSD FRML MDRD: ABNORMAL ML/MIN/{1.73_M2}
GFR SERPL CREATININE-BSD FRML MDRD: ABNORMAL ML/MIN/{1.73_M2}
GLUCOSE BLD-MCNC: 97 MG/DL (ref 70–99)
HCT VFR BLD CALC: 37.5 % (ref 36.3–47.1)
HDLC SERPL-MCNC: 61 MG/DL
HEMOGLOBIN: 12.7 G/DL (ref 11.9–15.1)
IMMATURE GRANULOCYTES: 0 %
INR BLD: 1
LDL CHOLESTEROL: 51 MG/DL (ref 0–130)
LYMPHOCYTES # BLD: 21 % (ref 24–43)
MCH RBC QN AUTO: 32.4 PG (ref 25.2–33.5)
MCHC RBC AUTO-ENTMCNC: 33.9 G/DL (ref 28.4–34.8)
MCV RBC AUTO: 95.7 FL (ref 82.6–102.9)
MONOCYTES # BLD: 9 % (ref 3–12)
MYOGLOBIN: <21 NG/ML (ref 25–58)
NRBC AUTOMATED: 0 PER 100 WBC
PARTIAL THROMBOPLASTIN TIME: 26.5 SEC (ref 20.5–30.5)
PDW BLD-RTO: 13.8 % (ref 11.8–14.4)
PLATELET # BLD: 255 K/UL (ref 138–453)
PLATELET ESTIMATE: ABNORMAL
PMV BLD AUTO: 9.2 FL (ref 8.1–13.5)
POTASSIUM SERPL-SCNC: 4.2 MMOL/L (ref 3.7–5.3)
PROTHROMBIN TIME: 10.3 SEC (ref 9.1–12.3)
RBC # BLD: 3.92 M/UL (ref 3.95–5.11)
RBC # BLD: ABNORMAL 10*6/UL
SEDIMENTATION RATE, ERYTHROCYTE: 1 MM (ref 0–30)
SEG NEUTROPHILS: 68 % (ref 36–65)
SEGMENTED NEUTROPHILS ABSOLUTE COUNT: 5.82 K/UL (ref 1.5–8.1)
SODIUM BLD-SCNC: 133 MMOL/L (ref 135–144)
TOTAL CK: 25 U/L (ref 26–192)
TRIGL SERPL-MCNC: 55 MG/DL
TROPONIN INTERP: ABNORMAL
TROPONIN T: ABNORMAL NG/ML
TROPONIN, HIGH SENSITIVITY: <6 NG/L (ref 0–14)
VLDLC SERPL CALC-MCNC: NORMAL MG/DL (ref 1–30)
WBC # BLD: 8.5 K/UL (ref 3.5–11.3)
WBC # BLD: ABNORMAL 10*3/UL

## 2021-05-12 PROCEDURE — 70551 MRI BRAIN STEM W/O DYE: CPT

## 2021-05-12 PROCEDURE — 80048 BASIC METABOLIC PNL TOTAL CA: CPT

## 2021-05-12 PROCEDURE — 99284 EMERGENCY DEPT VISIT MOD MDM: CPT

## 2021-05-12 PROCEDURE — 70498 CT ANGIOGRAPHY NECK: CPT

## 2021-05-12 PROCEDURE — 85610 PROTHROMBIN TIME: CPT

## 2021-05-12 PROCEDURE — 85730 THROMBOPLASTIN TIME PARTIAL: CPT

## 2021-05-12 PROCEDURE — 82550 ASSAY OF CK (CPK): CPT

## 2021-05-12 PROCEDURE — 6370000000 HC RX 637 (ALT 250 FOR IP): Performed by: STUDENT IN AN ORGANIZED HEALTH CARE EDUCATION/TRAINING PROGRAM

## 2021-05-12 PROCEDURE — 36415 COLL VENOUS BLD VENIPUNCTURE: CPT

## 2021-05-12 PROCEDURE — 82553 CREATINE MB FRACTION: CPT

## 2021-05-12 PROCEDURE — 2580000003 HC RX 258: Performed by: STUDENT IN AN ORGANIZED HEALTH CARE EDUCATION/TRAINING PROGRAM

## 2021-05-12 PROCEDURE — 85025 COMPLETE CBC W/AUTO DIFF WBC: CPT

## 2021-05-12 PROCEDURE — 1200000000 HC SEMI PRIVATE

## 2021-05-12 PROCEDURE — 83874 ASSAY OF MYOGLOBIN: CPT

## 2021-05-12 PROCEDURE — 84484 ASSAY OF TROPONIN QUANT: CPT

## 2021-05-12 PROCEDURE — 80061 LIPID PANEL: CPT

## 2021-05-12 PROCEDURE — 70450 CT HEAD/BRAIN W/O DYE: CPT

## 2021-05-12 PROCEDURE — 83036 HEMOGLOBIN GLYCOSYLATED A1C: CPT

## 2021-05-12 PROCEDURE — 6360000004 HC RX CONTRAST MEDICATION: Performed by: STUDENT IN AN ORGANIZED HEALTH CARE EDUCATION/TRAINING PROGRAM

## 2021-05-12 RX ORDER — PANTOPRAZOLE SODIUM 40 MG/1
40 TABLET, DELAYED RELEASE ORAL
Status: DISCONTINUED | OUTPATIENT
Start: 2021-05-13 | End: 2021-05-18 | Stop reason: HOSPADM

## 2021-05-12 RX ORDER — PROMETHAZINE HYDROCHLORIDE 12.5 MG/1
12.5 TABLET ORAL EVERY 6 HOURS PRN
Status: DISCONTINUED | OUTPATIENT
Start: 2021-05-12 | End: 2021-05-18 | Stop reason: HOSPADM

## 2021-05-12 RX ORDER — ATORVASTATIN CALCIUM 40 MG/1
40 TABLET, FILM COATED ORAL DAILY
Status: DISCONTINUED | OUTPATIENT
Start: 2021-05-13 | End: 2021-05-13

## 2021-05-12 RX ORDER — SODIUM CHLORIDE 9 MG/ML
25 INJECTION, SOLUTION INTRAVENOUS PRN
Status: DISCONTINUED | OUTPATIENT
Start: 2021-05-12 | End: 2021-05-18 | Stop reason: HOSPADM

## 2021-05-12 RX ORDER — POLYETHYLENE GLYCOL 3350 17 G/17G
17 POWDER, FOR SOLUTION ORAL DAILY PRN
Status: DISCONTINUED | OUTPATIENT
Start: 2021-05-12 | End: 2021-05-18 | Stop reason: HOSPADM

## 2021-05-12 RX ORDER — SODIUM CHLORIDE 9 MG/ML
INJECTION, SOLUTION INTRAVENOUS CONTINUOUS
Status: DISCONTINUED | OUTPATIENT
Start: 2021-05-12 | End: 2021-05-15

## 2021-05-12 RX ORDER — ONDANSETRON 2 MG/ML
4 INJECTION INTRAMUSCULAR; INTRAVENOUS EVERY 6 HOURS PRN
Status: DISCONTINUED | OUTPATIENT
Start: 2021-05-12 | End: 2021-05-18 | Stop reason: HOSPADM

## 2021-05-12 RX ORDER — ASPIRIN 300 MG/1
300 SUPPOSITORY RECTAL DAILY
Status: DISCONTINUED | OUTPATIENT
Start: 2021-05-13 | End: 2021-05-18 | Stop reason: HOSPADM

## 2021-05-12 RX ORDER — AMLODIPINE BESYLATE 2.5 MG/1
2.5 TABLET ORAL DAILY
Status: DISCONTINUED | OUTPATIENT
Start: 2021-05-12 | End: 2021-05-18 | Stop reason: HOSPADM

## 2021-05-12 RX ORDER — ASPIRIN 81 MG/1
81 TABLET ORAL DAILY
Status: DISCONTINUED | OUTPATIENT
Start: 2021-05-13 | End: 2021-05-18 | Stop reason: HOSPADM

## 2021-05-12 RX ORDER — HYDRALAZINE HYDROCHLORIDE 20 MG/ML
5 INJECTION INTRAMUSCULAR; INTRAVENOUS EVERY 6 HOURS PRN
Status: DISCONTINUED | OUTPATIENT
Start: 2021-05-12 | End: 2021-05-15

## 2021-05-12 RX ORDER — SODIUM CHLORIDE 0.9 % (FLUSH) 0.9 %
5-40 SYRINGE (ML) INJECTION PRN
Status: DISCONTINUED | OUTPATIENT
Start: 2021-05-12 | End: 2021-05-18 | Stop reason: HOSPADM

## 2021-05-12 RX ORDER — SODIUM CHLORIDE 0.9 % (FLUSH) 0.9 %
5-40 SYRINGE (ML) INJECTION EVERY 12 HOURS SCHEDULED
Status: DISCONTINUED | OUTPATIENT
Start: 2021-05-12 | End: 2021-05-18 | Stop reason: HOSPADM

## 2021-05-12 RX ORDER — LEVOTHYROXINE SODIUM 0.07 MG/1
75 TABLET ORAL DAILY
Status: DISCONTINUED | OUTPATIENT
Start: 2021-05-13 | End: 2021-05-18 | Stop reason: HOSPADM

## 2021-05-12 RX ADMIN — ASPIRIN 325 MG: 325 TABLET, COATED ORAL at 22:59

## 2021-05-12 RX ADMIN — MAGNESIUM GLUCONATE 500 MG ORAL TABLET 400 MG: 500 TABLET ORAL at 22:59

## 2021-05-12 RX ADMIN — AMLODIPINE BESYLATE 2.5 MG: 2.5 TABLET ORAL at 22:59

## 2021-05-12 RX ADMIN — IOPAMIDOL 90 ML: 755 INJECTION, SOLUTION INTRAVENOUS at 20:55

## 2021-05-12 RX ADMIN — APIXABAN 2.5 MG: 2.5 TABLET, FILM COATED ORAL at 22:59

## 2021-05-12 RX ADMIN — SODIUM CHLORIDE, PRESERVATIVE FREE 10 ML: 5 INJECTION INTRAVENOUS at 22:28

## 2021-05-12 RX ADMIN — SODIUM CHLORIDE: 9 INJECTION, SOLUTION INTRAVENOUS at 22:28

## 2021-05-12 ASSESSMENT — ENCOUNTER SYMPTOMS
ABDOMINAL PAIN: 0
BACK PAIN: 0
CHEST TIGHTNESS: 0
COUGH: 0
SHORTNESS OF BREATH: 0
DIARRHEA: 0
NAUSEA: 0
VOMITING: 0
WHEEZING: 0
CONSTIPATION: 0
COLOR CHANGE: 0

## 2021-05-12 ASSESSMENT — PAIN SCALES - GENERAL: PAINLEVEL_OUTOF10: 0

## 2021-05-12 NOTE — ED PROVIDER NOTES
[address] [facility]                                      Emergency Department                                      Faculty Attestation                                         I performed a history and physical examination of the patient and discussed management with the resident. I reviewed the residents note and agree with the documented findings and plan of care. Any areas of disagreement are noted on the chart. I was personally present for the key portions of any procedures. I have documented in the chart those procedures where I was not present during the key portions. I agree with the chief complaint, past medical history, past surgical history, allergies, medications, social and family history as documented unless otherwise noted below. For mid-level providers such as nurse practitioners as well as physicians assistants:    I have personally seen and evaluated the patient. I find the patient's history and physical exam are consistent with NP/PA documentation. I agree with the care provided, treatment rendered, disposition, & follow-up plan. Additional findings are as noted  Patient started to come to the emergency department for findings of an MRI performed today concerning for acute subacute stroke. MRI was obtained for the patient has been dizzy for a \"long time\". Patient has no complaints at this time    Hypertensive, not requiring emergent treatment. Vital signs otherwise acceptable. Patient is in no apparent distress. No gross focal neurologic deficit appreciated.      Kimberly Goldmann,   05/12/21 1922

## 2021-05-12 NOTE — ED PROVIDER NOTES
[facility]  Emergency Department Encounter  EmergencyMedicine Resident     Pt Leigh Tran  MRN: [id number]  [place] 1935  Date of evaluation: 5/12/21  PCP:  Bryan Rodas DO    CHIEF COMPLAINT       Chief Complaint   Patient presents with    Other     sent by  for abnormal mri result       HISTORY OF PRESENT ILLNESS  (Location/Symptom, Timing/Onset, Context/Setting, Quality, Duration, Modifying Factors, Severity.)      Gary Singh is a 82 y.o. female who presents with abnormal outpatient MRI. Patient was found to have an acute/subacute 5 mm cortical infarct in the superior left temporal lobe as outpatient. 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 Patient states she has been more confused and had some dizziness, which prompted the MRI with neurology. Denies any complaints at all. PAST MEDICAL / SURGICAL / SOCIAL / FAMILY HISTORY      has no past medical history on file. has no past surgical history on file.       Social History     Socioeconomic History    Marital status:      Spouse name: Not on file    Number of children: Not on file    Years of education: Not on file    Highest education level: Not on file   Occupational History    Not on file   Social Needs    Financial resource strain: Not hard at all   [employer] insecurity     Worry: Never true     Inability: Never true   Geeks[employer] needs     Medical: Not on file     Non-medical: Not on file   Tobacco Use    Smoking status: Never Smoker    Smokeless tobacco: Never Used   Substance and Sexual Activity    Alcohol use: Yes     Comment: rare    Drug use: Never    Sexual activity: Not Currently   Lifestyle    Physical activity     Days per week: Not on file     Minutes per session: Not on file    Stress: Not on file   Relationships    Social connections     Talks on phone: Not on file     Gets together: Not on file     Attends Voodoo service: Not on file     Active member of club or organization: Not on file     Attends meetings of clubs or organizations: Not on file     Relationship status: Not on file    Intimate partner violence     Fear of current or ex partner: Not on file     Emotionally abused: Not on file     Physically abused: Not on file     Forced sexual activity: Not on file   Other Topics Concern    Not on file   Social History Narrative    Not on file       History reviewed. No pertinent family history. Allergies:  Percocet [oxycodone-acetaminophen] and Hydrocodone    Home Medications:  Prior to Admission medications    Medication Sig Start Date End Date Taking?  Authorizing Provider   pantoprazole (PROTONIX) 40 MG tablet take 1 tablet by mouth once daily 4/27/21   Caridad Nova, DO   Cholecalciferol (VITAMIN D3 PO) Take by mouth daily    Historical Provider, MD   Cyanocobalamin (VITAMIN B-12 PO) Take by mouth daily    Historical Provider, MD   simvastatin (ZOCOR) 10 MG tablet take 1 tablet by mouth every evening 4/14/21   Caridad Nova DO   levothyroxine (SYNTHROID) 75 MCG tablet take 1 tablet by mouth once daily 3/22/21   Caridad Nova DO   oxybutynin (DITROPAN XL) 10 MG extended release tablet Take 1 tablet by mouth daily  Patient not taking: Reported on 4/27/2021 2/25/21   Caridad Nova DO   hydrocortisone (PROCTO-MED HC) 2.5 % CREA rectal cream apply 1 applicatorful rectally four times a day if needed for HEMORRHOIDS 2/22/21   Caridad Nova DO   ELIQUIS 2.5 MG TABS tablet take 1 tablet by mouth twice a day 2/16/21   Caridad Nova DO   apixaban (ELIQUIS) 2.5 MG TABS tablet Take 1 tablet by mouth 2 times daily 2/16/21   Caridad Nova DO   docusate sodium (COLACE) 100 MG capsule Take 100 mg by mouth 2 times daily    Historical Provider, MD   promethazine (PHENERGAN) 25 MG tablet take 1 tablet by mouth three times a day 30 MINUTES PRIOR TO MEALS for 10 days 9/29/20   Historical Provider, MD   nystatin (MYCOSTATIN) 384014 UNIT/GM cream Apply topically 2 times daily. 8/28/20   Caridad Nova DO   lipase-protease-amylase (CREON) 49252 units delayed release capsule Take 1 capsule by mouth 3 times daily (with meals) 8/6/20   Caridad Nova DO   Magnesium Oxide 250 MG TABS Take 400 mg by mouth 2 times daily     Historical Provider, MD       REVIEW OF SYSTEMS    (2-9 systems for level 4, 10 or more for level 5)      Review of Systems   Constitutional: Positive for fatigue. Negative for chills, diaphoresis and fever. Respiratory: Negative for cough, chest tightness, shortness of breath and wheezing. Cardiovascular: Negative for chest pain, palpitations and leg swelling. Gastrointestinal: Negative for abdominal pain, constipation, diarrhea, nausea and vomiting. Endocrine: Negative for polydipsia, polyphagia and polyuria. Genitourinary: Negative for difficulty urinating, dysuria and urgency. Musculoskeletal: Negative for arthralgias, back pain, neck pain and neck stiffness. Skin: Negative for color change, pallor and rash. Neurological: Positive for light-headedness. Negative for dizziness, weakness and headaches. PHYSICAL EXAM   (up to 7 for level 4, 8 or more for level 5)      INITIAL VITALS:   BP (!) 189/80   Pulse 60   Temp 96.3 °F (35.7 °C) (Temporal)   Resp 18   SpO2 100%     Physical Exam  Vitals signs and nursing note reviewed. Constitutional:       General: She is not in acute distress. Appearance: She is well-developed. She is not diaphoretic. HENT:      Head: Normocephalic and atraumatic. Eyes:      General: No scleral icterus. Conjunctiva/sclera: Conjunctivae normal.      Pupils: Pupils are equal, round, and reactive to light. Neck:      Musculoskeletal: Normal range of motion and neck supple. Vascular: No JVD. Trachea: No tracheal deviation. Cardiovascular:      Rate and Rhythm: Normal rate and regular rhythm. Heart sounds: Normal heart sounds. No murmur.  No friction rub. Pulmonary:      Effort: Pulmonary effort is normal. No respiratory distress. Breath sounds: Normal breath sounds. No wheezing. Chest:      Chest wall: No tenderness. Abdominal:      General: Bowel sounds are normal. There is no distension. Palpations: Abdomen is soft. Tenderness: There is no abdominal tenderness. There is no guarding. Skin:     General: Skin is warm and dry. Capillary Refill: Capillary refill takes less than 2 seconds. Coloration: Skin is not pale. Findings: No erythema. Neurological:      General: No focal deficit present. Mental Status: She is alert and oriented to person, place, and time. Cranial Nerves: No cranial nerve deficit. Sensory: No sensory deficit. Motor: No weakness. Coordination: Coordination normal.      Comments: Full NIH exam performed, NIH of 0   Psychiatric:         Mood and Affect: Mood normal.         Behavior: Behavior normal.         DIFFERENTIAL  DIAGNOSIS     PLAN (LABS / IMAGING / EKG):  Orders Placed This Encounter   Procedures    CTA HEAD NECK W CONTRAST    CT HEAD WO CONTRAST    STROKE PANEL    CBC    Hemoglobin A1c    Lipid panel - fasting    Basic Metabolic Panel w/ Reflex to MG    Vital signs    Up as tolerated    Adv Diet as Tolerated (nurse communication)  Diet General    NIHSS/Neuro Checks    Tobacco cessation education    Swallow screen by nursing before diet and oral medications started.     Stroke education    Vital signs per unit routine    Telemetry monitoring - 48 hour duration    Notify Physician    Full Code    Inpatient consult to Stroke Team    OT eval and treat    PT evaluation and treat    Initiate Oxygen Therapy Protocol    Speech Language Pathology (SLP) eval and treat    Echo Limited    PATIENT STATUS (FROM ED OR OR/PROCEDURAL) Inpatient       MEDICATIONS ORDERED:  Orders Placed This Encounter   Medications    amLODIPine (NORVASC) tablet 2.5 mg  iopamidol (ISOVUE-370) 76 % injection 90 mL    apixaban (ELIQUIS) tablet 2.5 mg    levothyroxine (SYNTHROID) tablet 75 mcg    lipase-protease-amylase (CREON) delayed release capsule 12,000 Units    magnesium oxide (MAG-OX) tablet 400 mg    atorvastatin (LIPITOR) tablet 40 mg    pantoprazole (PROTONIX) tablet 40 mg    sodium chloride flush 0.9 % injection 5-40 mL    sodium chloride flush 0.9 % injection 5-40 mL    0.9 % sodium chloride infusion    OR Linked Order Group     promethazine (PHENERGAN) tablet 12.5 mg     ondansetron (ZOFRAN) injection 4 mg    polyethylene glycol (GLYCOLAX) packet 17 g    OR Linked Order Group     aspirin EC tablet 81 mg     aspirin suppository 300 mg    0.9 % sodium chloride infusion    hydrALAZINE (APRESOLINE) injection 5 mg    aspirin EC tablet 325 mg       DDX: Stroke, encephalopathy    MDM/IMPRESSION: This 82-year-old female presenting with acute/subacute stroke diagnosed on MRI on outpatient MRI. Patient called by neurologist, told to come the emergency department for admission. Plan for stroke consult. NIH is 0, has had generalized fatigue and lightheadedness for several weeks which is why the MRI was ordered. Stroke panel ordered. Admission to neurology.     DIAGNOSTIC RESULTS / EMERGENCY DEPARTMENT COURSE / MDM   LAB RESULTS:  Results for orders placed or performed during the hospital encounter of 05/12/21   STROKE PANEL   Result Value Ref Range    Glucose 97 70 - 99 mg/dL    BUN 11 8 - 23 mg/dL    CREATININE 0.77 0.50 - 0.90 mg/dL    Bun/Cre Ratio NOT REPORTED 9 - 20    Calcium 8.9 8.6 - 10.4 mg/dL    Sodium 133 (L) 135 - 144 mmol/L    Potassium 4.2 3.7 - 5.3 mmol/L    Chloride 96 (L) 98 - 107 mmol/L    CO2 24 20 - 31 mmol/L    Anion Gap 13 9 - 17 mmol/L    GFR Non-African American >60 >60 mL/min    GFR African American >60 >60 mL/min    GFR Comment          GFR Staging NOT REPORTED     WBC 8.5 3.5 - 11.3 k/uL    RBC 3.92 (L) 3.95 - 5.11 m/uL Hemoglobin 12.7 11.9 - 15.1 g/dL    Hematocrit 37.5 36.3 - 47.1 %    MCV 95.7 82.6 - 102.9 fL    MCH 32.4 25.2 - 33.5 pg    MCHC 33.9 28.4 - 34.8 g/dL    RDW 13.8 11.8 - 14.4 %    Platelets 147 084 - 465 k/uL    MPV 9.2 8.1 - 13.5 fL    NRBC Automated 0.0 0.0 per 100 WBC    Total CK 25 (L) 26 - 192 U/L    CK-MB 1.0 <5.4 ng/mL    % CKMB 4.0 (H) 0.0 - 3.0 %    CKMB Interpretation NORMAL ISOENZYME PATTERN     Differential Type NOT REPORTED     Seg Neutrophils 68 (H) 36 - 65 %    Lymphocytes 21 (L) 24 - 43 %    Monocytes 9 3 - 12 %    Eosinophils % 1 1 - 4 %    Basophils 1 0 - 2 %    Immature Granulocytes 0 0 %    Segs Absolute 5.82 1.50 - 8.10 k/uL    Absolute Lymph # 1.78 1.10 - 3.70 k/uL    Absolute Mono # 0.79 0.10 - 1.20 k/uL    Absolute Eos # 0.08 0.00 - 0.44 k/uL    Basophils Absolute 0.04 0.00 - 0.20 k/uL    Absolute Immature Granulocyte 0.03 0.00 - 0.30 k/uL    WBC Morphology NOT REPORTED     RBC Morphology NOT REPORTED     Platelet Estimate NOT REPORTED     Myoglobin <21 (L) 25 - 58 ng/mL    Protime 10.3 9.1 - 12.3 sec    INR 1.0     PTT 26.5 20.5 - 30.5 sec    Troponin, High Sensitivity <6 0 - 14 ng/L    Troponin T NOT REPORTED <0.03 ng/mL    Troponin Interp NOT REPORTED          RADIOLOGY:  CTA HEAD NECK W CONTRAST   Final Result   Severe 80% stenosis at the origin of left cervical ICA. Mild stenosis at the origin of left vertebral artery which directly arises   from the arch. Unremarkable CTA of the head with anatomical variation as described.      ==================================      CAROTID STENOSIS REFERENCE: The [employer] Symptomatic Carotid   Endarterectomy Trial (NASCET) is a method of quantifying internal carotid   artery stenosis. Distal internal carotid artery diameter as the denominator   for stenosis measurement:      MILD = <50% stenosis. MODERATE =  50-69% stenosis. SEVERE =  70-89% stenosis. HAIRLINE/CRITICAL = 90-99% stenosis. OCCLUDED = 100% stenosis.

## 2021-05-12 NOTE — TELEPHONE ENCOUNTER
Dr. Ryan Cox called back. He reviewed the MRI and said that [facility] needed to be admitted into [facility] Given the time of day he wants her sent to the ED. I called her daughter Cruz Law with this information. She was hesitant about going. I stressed that Dr. Ryan Cox said she needed to be seen there. At the end of the conversation she said that either she or her sister would take her mother to the ED. I sent a perfect serve message to Dr. Marzena Marshall letting him know this.

## 2021-05-13 ENCOUNTER — PATIENT MESSAGE (OUTPATIENT)
Dept: FAMILY MEDICINE CLINIC | Age: 86
End: 2021-05-13

## 2021-05-13 LAB
ANION GAP SERPL CALCULATED.3IONS-SCNC: 7 MMOL/L (ref 9–17)
BUN BLDV-MCNC: 11 MG/DL (ref 8–23)
BUN/CREAT BLD: ABNORMAL (ref 9–20)
CALCIUM SERPL-MCNC: 8.7 MG/DL (ref 8.6–10.4)
CHLORIDE BLD-SCNC: 102 MMOL/L (ref 98–107)
CO2: 24 MMOL/L (ref 20–31)
CREAT SERPL-MCNC: 0.81 MG/DL (ref 0.5–0.9)
ESTIMATED AVERAGE GLUCOSE: 108 MG/DL
FOLATE: 11.2 NG/ML
FOLATE: 11.9 NG/ML
GFR AFRICAN AMERICAN: >60 ML/MIN
GFR NON-AFRICAN AMERICAN: >60 ML/MIN
GFR SERPL CREATININE-BSD FRML MDRD: ABNORMAL ML/MIN/{1.73_M2}
GFR SERPL CREATININE-BSD FRML MDRD: ABNORMAL ML/MIN/{1.73_M2}
GLUCOSE BLD-MCNC: 84 MG/DL (ref 70–99)
HBA1C MFR BLD: 5.4 % (ref 4–6)
HCT VFR BLD CALC: 36.6 % (ref 36.3–47.1)
HEMOGLOBIN: 11.9 G/DL (ref 11.9–15.1)
MCH RBC QN AUTO: 31.9 PG (ref 25.2–33.5)
MCHC RBC AUTO-ENTMCNC: 32.5 G/DL (ref 28.4–34.8)
MCV RBC AUTO: 98.1 FL (ref 82.6–102.9)
NRBC AUTOMATED: 0 PER 100 WBC
PDW BLD-RTO: 14 % (ref 11.8–14.4)
PLATELET # BLD: 224 K/UL (ref 138–453)
PMV BLD AUTO: 9.6 FL (ref 8.1–13.5)
POTASSIUM SERPL-SCNC: 4.1 MMOL/L (ref 3.7–5.3)
RBC # BLD: 3.73 M/UL (ref 3.95–5.11)
SEDIMENTATION RATE, ERYTHROCYTE: 15 MM (ref 0–30)
SODIUM BLD-SCNC: 133 MMOL/L (ref 135–144)
T. PALLIDUM, IGG: NONREACTIVE
T. PALLIDUM, IGG: NONREACTIVE
VITAMIN B-12: 695 PG/ML (ref 232–1245)
VITAMIN B-12: 903 PG/ML (ref 232–1245)
VITAMIN D 25-HYDROXY: 26.3 NG/ML (ref 30–100)
WBC # BLD: 6.4 K/UL (ref 3.5–11.3)

## 2021-05-13 PROCEDURE — 36415 COLL VENOUS BLD VENIPUNCTURE: CPT

## 2021-05-13 PROCEDURE — 6370000000 HC RX 637 (ALT 250 FOR IP): Performed by: STUDENT IN AN ORGANIZED HEALTH CARE EDUCATION/TRAINING PROGRAM

## 2021-05-13 PROCEDURE — 82746 ASSAY OF FOLIC ACID SERUM: CPT

## 2021-05-13 PROCEDURE — 92523 SPEECH SOUND LANG COMPREHEN: CPT

## 2021-05-13 PROCEDURE — 1200000000 HC SEMI PRIVATE

## 2021-05-13 PROCEDURE — 80048 BASIC METABOLIC PNL TOTAL CA: CPT

## 2021-05-13 PROCEDURE — 99223 1ST HOSP IP/OBS HIGH 75: CPT | Performed by: PSYCHIATRY & NEUROLOGY

## 2021-05-13 PROCEDURE — 97110 THERAPEUTIC EXERCISES: CPT

## 2021-05-13 PROCEDURE — 93306 TTE W/DOPPLER COMPLETE: CPT

## 2021-05-13 PROCEDURE — 2580000003 HC RX 258: Performed by: STUDENT IN AN ORGANIZED HEALTH CARE EDUCATION/TRAINING PROGRAM

## 2021-05-13 PROCEDURE — 97166 OT EVAL MOD COMPLEX 45 MIN: CPT

## 2021-05-13 PROCEDURE — 97162 PT EVAL MOD COMPLEX 30 MIN: CPT

## 2021-05-13 PROCEDURE — 85027 COMPLETE CBC AUTOMATED: CPT

## 2021-05-13 PROCEDURE — 86780 TREPONEMA PALLIDUM: CPT

## 2021-05-13 PROCEDURE — 97530 THERAPEUTIC ACTIVITIES: CPT

## 2021-05-13 PROCEDURE — 82306 VITAMIN D 25 HYDROXY: CPT

## 2021-05-13 PROCEDURE — 85652 RBC SED RATE AUTOMATED: CPT

## 2021-05-13 PROCEDURE — 97535 SELF CARE MNGMENT TRAINING: CPT

## 2021-05-13 PROCEDURE — 82607 VITAMIN B-12: CPT

## 2021-05-13 PROCEDURE — 99222 1ST HOSP IP/OBS MODERATE 55: CPT | Performed by: PSYCHIATRY & NEUROLOGY

## 2021-05-13 RX ORDER — ROSUVASTATIN CALCIUM 20 MG/1
20 TABLET, COATED ORAL NIGHTLY
Status: DISCONTINUED | OUTPATIENT
Start: 2021-05-13 | End: 2021-05-18 | Stop reason: HOSPADM

## 2021-05-13 RX ADMIN — APIXABAN 2.5 MG: 2.5 TABLET, FILM COATED ORAL at 09:37

## 2021-05-13 RX ADMIN — PANTOPRAZOLE SODIUM 40 MG: 40 TABLET, DELAYED RELEASE ORAL at 09:38

## 2021-05-13 RX ADMIN — MAGNESIUM GLUCONATE 500 MG ORAL TABLET 400 MG: 500 TABLET ORAL at 21:30

## 2021-05-13 RX ADMIN — APIXABAN 2.5 MG: 2.5 TABLET, FILM COATED ORAL at 21:30

## 2021-05-13 RX ADMIN — LEVOTHYROXINE SODIUM 75 MCG: 75 TABLET ORAL at 09:38

## 2021-05-13 RX ADMIN — MAGNESIUM GLUCONATE 500 MG ORAL TABLET 400 MG: 500 TABLET ORAL at 09:38

## 2021-05-13 RX ADMIN — SODIUM CHLORIDE, PRESERVATIVE FREE 10 ML: 5 INJECTION INTRAVENOUS at 21:30

## 2021-05-13 RX ADMIN — Medication 81 MG: at 09:38

## 2021-05-13 RX ADMIN — PANCRELIPASE 12000 UNITS: 30000; 6000; 19000 CAPSULE, DELAYED RELEASE PELLETS ORAL at 13:55

## 2021-05-13 RX ADMIN — AMLODIPINE BESYLATE 2.5 MG: 2.5 TABLET ORAL at 09:38

## 2021-05-13 ASSESSMENT — PAIN SCALES - GENERAL: PAINLEVEL_OUTOF10: 0

## 2021-05-13 NOTE — CONSULTS
but could not tolerate the side effects of nausea and vomiting. The family stating that they are not sure if the patient is taking her medications as she is supposed to. The patient stated that she has a lot of stress anxiety because of her skin cancer on her nose returned back. MRI of the brain was ordered and it did show subacute punctuate stroke in the left superior temporal lobe. 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 Basic lab work-up for the dementia VDRL, vitamin B12 and folate, TSH, ESR were sent. EEG was ordered and was unremarkable. In the ED, the patient was alert oriented x4 and vitally stable. Systolic blood pressure was around 190. Neurological exam was unremarkable. MMSE score was 28, was able to repeat 1 out of 3 words. Of note, vascular carotid duplex ultrasound in 2019 showed less than 50% stenosis bilateral ICAs. NIH Stroke Scale Total (if not done complete detailed one below):    1a.  Level of consciousness:  0 - alert; keenly responsive  1b. Level of consciousness questions:  0 - answers both questions correctly  1c. Level of consciousness questions:  0 - performs both tasks correctly  2. Best Gaze:  0 - normal  3. Visual:  0 - no visual loss  4. Facial Palsy:  0 - normal symmetric movement  5a. Motor left arm:  0 - no drift, limb holds 90 (or 45) degrees for full 10 seconds  5b. Motor right arm:  0 - no drift, limb holds 90 (or 45) degrees for full 10 seconds  6a. Motor left le - no drift; leg holds 30 degree position for full 5 seconds  6b. Motor right le - no drift; leg holds 30 degree position for full 5 seconds  7. Limb Ataxia:  0 - absent  8. Sensory:  0 - normal; no sensory loss  9. Best Language:  0 - no aphasia, normal  10. Dysarthria:  0 - normal  11.   Extinction and Inattention:  0 - no abnormality      NIHSS of 0    Past Medical History:     History reviewed. No pertinent past medical history. Past Surgical History:     History reviewed. No pertinent surgical history. Medications Prior to Admission:     Prior to Admission medications    Medication Sig Start Date End Date Taking? Authorizing Provider   pantoprazole (PROTONIX) 40 MG tablet take 1 tablet by mouth once daily 4/27/21   Caridad Nova DO   Cholecalciferol (VITAMIN D3 PO) Take by mouth daily    Historical Provider, MD   Cyanocobalamin (VITAMIN B-12 PO) Take by mouth daily    Historical Provider, MD   simvastatin (ZOCOR) 10 MG tablet take 1 tablet by mouth every evening 4/14/21   Caridad Nova DO   levothyroxine (SYNTHROID) 75 MCG tablet take 1 tablet by mouth once daily 3/22/21   Caridad Nova,    oxybutynin (DITROPAN XL) 10 MG extended release tablet Take 1 tablet by mouth daily  Patient not taking: Reported on 4/27/2021 2/25/21   Caridad Nova DO   hydrocortisone (PROCTO-MED HC) 2.5 % CREA rectal cream apply 1 applicatorful rectally four times a day if needed for HEMORRHOIDS 2/22/21   Caridad Nova DO   ELIQUIS 2.5 MG TABS tablet take 1 tablet by mouth twice a day 2/16/21   Caridad Nova DO   apixaban (ELIQUIS) 2.5 MG TABS tablet Take 1 tablet by mouth 2 times daily 2/16/21   Caridad Nova DO   docusate sodium (COLACE) 100 MG capsule Take 100 mg by mouth 2 times daily    Historical Provider, MD   promethazine (PHENERGAN) 25 MG tablet take 1 tablet by mouth three times a day 30 MINUTES PRIOR TO MEALS for 10 days 9/29/20   Historical Provider, MD   nystatin (MYCOSTATIN) 042662 UNIT/GM cream Apply topically 2 times daily.  8/28/20   Caridad Nova DO   lipase-protease-amylase (CREON) 92763 units delayed release capsule Take 1 capsule by mouth 3 times daily (with meals) 8/6/20   Caridad Nova DO   Magnesium Oxide 250 MG TABS Take 400 mg by mouth 2 times daily     Historical Provider, MD        Allergies:     Percocet [oxycodone-acetaminophen] and Hydrocodone    Social History: Tobacco:    reports that she has never smoked. She has never used smokeless tobacco.  Alcohol:      reports current alcohol use. Drug Use:  reports no history of drug use. Family History:     History reviewed. No pertinent family history. Review of Systems:     ROS:  Constitutional  Negative for fever and chills    HEENT  Negative for ear discharge, ear pain, nosebleed    Eyes  Negative for photophobia, pain and discharge    Respiratory  Negative for hemoptysis and sputum    Cardiovascular  Negative for orthopnea, claudication and PND    Gastrointestinal  Negative for abdominal pain, diarrhea, blood in stool    Musculoskeletal  Negative for joint pain, negative for myalgia    Neurology  memory issues. Skin  Negative for rash or itching    Endo/heme/allergies  Negative for polydipsia, environmental allergy    Psychiatric/behavioral  Negative for suicidal ideation. Patient is not anxious        Physical Exam:   BP (!) 111/95   Pulse 60   Temp 98.1 °F (36.7 °C) (Oral)   Resp 13   Ht 5' 5\" (1.651 m)   Wt 121 lb 4.1 oz (55 kg)   SpO2 97%   BMI 20.18 kg/m²   Temp (24hrs), Av.2 °F (36.2 °C), Min:96.3 °F (35.7 °C), Max:98.1 °F (36.7 °C)    No results for input(s): POCGLU in the last 72 hours. No intake or output data in the 24 hours ending 21 [address] comfortable and in no distress   HEENT  NC/ AT   NECK  Supple and no bruits heard   MENTAL STATUS:  Alert, oriented, affected short-term memory, was able to repeat 1 out of 3 words, no confusion, normal speech, normal language, no hallucination or delusion. MMSE was 28.    CRANIAL NERVES: II     -      Visual fields intact to confrontation  III,IV,VI -  EOMs full, no afferent defect, no DORIE, no ptosis  V     -     Normal facial sensation  VII    -     Normal facial symmetry  VIII   -     Intact hearing  IX,X -     Symmetrical palate  XI    -     Symmetrical shoulder shrug  XII   -     Midline tongue, no atrophy    MOTOR FUNCTION:  significant for good strength of grade 5/5 in bilateral proximal and distal muscle groups of both upper and lower extremities with normal bulk, normal tone and no involuntary movements, no tremor   SENSORY FUNCTION:  Normal touch, normal pin, normal vibration, normal proprioception   CEREBELLAR FUNCTION:  Intact fine motor control over upper limbs   REFLEX FUNCTION:  Symmetric, no perverted reflex, no Babinski sign   STATION and GAIT  Not tested       Investigations:      Laboratory Testing:  Recent Results (from the past 24 hour(s))   Vitamin B12    Collection Time: 05/12/21  2:13 PM   Result Value Ref Range    Vitamin B-12 903 232 - 1245 pg/mL   Folate    Collection Time: 05/12/21  2:13 PM   Result Value Ref Range    Folate 11.9 >4.8 ng/mL   Sedimentation rate, automated    Collection Time: 05/12/21  2:13 PM   Result Value Ref Range    Sed Rate 1 0 - 30 mm   T. pallidum Ab    Collection Time: 05/12/21  2:13 PM   Result Value Ref Range    T. pallidum, IgG NONREACTIVE NONREACTIVE   STROKE PANEL    Collection Time: 05/12/21  7:47 PM   Result Value Ref Range    Glucose 97 70 - 99 mg/dL    BUN 11 8 - 23 mg/dL    CREATININE 0.77 0.50 - 0.90 mg/dL    Bun/Cre Ratio NOT REPORTED 9 - 20    Calcium 8.9 8.6 - 10.4 mg/dL    Sodium 133 (L) 135 - 144 mmol/L    Potassium 4.2 3.7 - 5.3 mmol/L    Chloride 96 (L) 98 - 107 mmol/L    CO2 24 20 - 31 mmol/L    Anion Gap 13 9 - 17 mmol/L    GFR Non-African American >60 >60 mL/min    GFR African American >60 >60 mL/min    GFR Comment          GFR Staging NOT REPORTED     WBC 8.5 3.5 - 11.3 k/uL    RBC 3.92 (L) 3.95 - 5.11 m/uL    Hemoglobin 12.7 11.9 - 15.1 g/dL    Hematocrit 37.5 36.3 - 47.1 %    MCV 95.7 82.6 - 102.9 fL    MCH 32.4 25.2 - 33.5 pg    MCHC 33.9 28.4 - 34.8 g/dL    RDW 13.8 11.8 - 14.4 %    Platelets 103 562 - 722 k/uL    MPV 9.2 8.1 - 13.5 fL    NRBC Automated 0.0 0.0 per 100 WBC    Total CK 25 (L) 26 - 192 U/L    CK-MB 1.0 <5.4 ng/mL    % CKMB 4.0 (H) 0.0 - 3.0 %    CKMB Interpretation NORMAL ISOENZYME PATTERN     Differential Type NOT REPORTED     Seg Neutrophils 68 (H) 36 - 65 %    Lymphocytes 21 (L) 24 - 43 %    Monocytes 9 3 - 12 %    Eosinophils % 1 1 - 4 %    Basophils 1 0 - 2 %    Immature Granulocytes 0 0 %    Segs Absolute 5.82 1.50 - 8.10 k/uL    Absolute Lymph # 1.78 1.10 - 3.70 k/uL    Absolute Mono # 0.79 0.10 - 1.20 k/uL    Absolute Eos # 0.08 0.00 - 0.44 k/uL    Basophils Absolute 0.04 0.00 - 0.20 k/uL    Absolute Immature Granulocyte 0.03 0.00 - 0.30 k/uL    WBC Morphology NOT REPORTED     RBC Morphology NOT REPORTED     Platelet Estimate NOT REPORTED     Myoglobin <21 (L) 25 - 58 ng/mL    Protime 10.3 9.1 - 12.3 sec    INR 1.0     PTT 26.5 20.5 - 30.5 sec    Troponin, High Sensitivity <6 0 - 14 ng/L    Troponin T NOT REPORTED <0.03 ng/mL    Troponin Interp NOT REPORTED    Lipid panel - fasting    Collection Time: 05/12/21 10:34 PM   Result Value Ref Range    Cholesterol 123 <200 mg/dL    HDL 61 >40 mg/dL    LDL Cholesterol 51 0 - 130 mg/dL    Chol/HDL Ratio 2.0 <5    Triglycerides 55 <150 mg/dL    VLDL NOT REPORTED 1 - 30 mg/dL         Assessment :      Primary Problem  Mild cognitive impairment. Active Hospital Problems    Diagnosis Date Noted    Stroke determined by clinical assessment [facility]) [I63.9] 05/12/2021       Plan:     The patient mentioned above with past medical history of A. fib on Eliquis 2.5 mg twice daily, hypothyroidism, hyperlipidemia, hypertension, chronic hyponatremia, was referred from Dr. Ervin Aguayo office for abnormal MRI. Left punctuate temporal lobe stroke. MMSE is 28. 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 MRI with left temporal stroke  CTA showed severe stenosis of the origin of the left cervical ICA.   We will load the patient with aspirin 324 mg, to continue aspirin 81 mg daily. Continue Eliquis 2.5 mg twice daily. Resume Lipitor  Check the basic blood work-up for dementia VDRL, vitamin B12 and folate, vitamin D, TSH, ESR.   EEG was unremarkable  High blood pressure, will start Norvasc 2.5 mg to keep systolic blood pressure less than 150. Hydralazine as needed for systolic pressure more than 190. Stroke work-up including echo, hemoglobin A1c and lipid panel  Discussed with Dr. Donna Finch    Follow-up further recommendations after discussing the case with attending  The plan was discussed with the patient, patient's family and the medical staff. Consultations:   IP CONSULT TO STROKE TEAM     Patient is admitted as inpatient status because of co-morbidities listed above, severity of signs and symptoms as outlined, requirement for current medical therapies and most importantly because of direct risk to patient if care not provided in a hospital setting.     Facundo Henriquez MD  5/13/2021  1:23 AM    Copy sent to Dr. Mukesh Peres DO

## 2021-05-13 NOTE — PROGRESS NOTES
Physical Therapy    Facility/Department: [facility] NEURO  Initial Assessment    NAME: Joselito Dickey  : 1935  MRN: [id number]    Date of Service: 2021  Copied from pt H&P: The patient is a 82 y.o. Non-/non  female who presents with Other (sent by  for abnormal mri result)   and she is admitted to the hospital for the management of memory issues. Discharge Recommendations:    Further therapy recommended at discharge. PT Equipment Recommendations  Equipment Needed: No    Assessment    Pt was alert and agreed with PT. Pt was very impulsive and instantly started moving as PT was explaining what was going to be done. The pt has decreased balance and feels more comfortable with a RW while ambulating. Pt is able to walk w/o RW and was \"surprised\" about \"how well they were doing\". Pt does better with standing on their R leg vs L leg. Body structures, Functions, Activity limitations: Decreased balance;Decreased endurance  Prognosis: Good  Decision Making: Medium Complexity  PT Education: Goals;PT Role;Plan of Care  REQUIRES PT FOLLOW UP: Yes  Activity Tolerance  Activity Tolerance: Patient Tolerated treatment well       Patient Diagnosis(es): The encounter diagnosis was Cerebrovascular accident (CVA), unspecified mechanism ([address] has no past medical history on file. has no past surgical history on file.     Restrictions  Restrictions/Precautions  Restrictions/Precautions: Fall Risk  Required Braces or Orthoses?: No  Vision/Hearing  Vision: Within Functional Limits  Hearing: Within functional limits     Subjective  General  Patient assessed for rehabilitation services?: Yes  Response To Previous Treatment: Not applicable  Family / Caregiver Present: No  Follows Commands: Within Functional Limits  Pain Screening  Patient Currently in Pain: Denies  Vital Signs  Patient Currently in Pain: Denies       Orientation  Orientation  Overall Orientation Status: Within Normal Limits  Social/Functional History  Social/Functional History  Lives With: Alone  Type of Home: ([facility] 2nd floor)  Home Layout: One level  Home Access: Elevator  ADL Assistance: Independent  Ambulation Assistance: Independent  Transfer Assistance: Independent  Active : No  Patient's  Info: (Daughters and neighbors drive pt)    Objective     Observation/Palpation  Posture: Good    AROM RLE (degrees)  RLE AROM: WFL  AROM LLE (degrees)  LLE AROM : WFL  AROM RUE (degrees)  RUE AROM : WFL  AROM LUE (degrees)  LUE AROM : WFL  Strength RLE  Strength RLE: WFL  Strength LLE  Strength LLE: WFL  Strength RUE  Strength RUE: WFL  Strength LUE  Strength LUE: WFL  Tone RLE  RLE Tone: Normotonic  Tone LLE  LLE Tone: Normotonic  Motor Control  Gross Motor?: WNL  Sensation  Overall Sensation Status: WNL  Bed mobility  Rolling to Left: Independent  Supine to Sit: Independent  Scooting: Independent  Transfers  Sit to Stand: Stand by assistance  Stand to sit: Stand by assistance  Ambulation  Ambulation?: Yes  More Ambulation?: Yes  Ambulation 1  Surface: level tile  Device: Rolling Walker(pt's walker with seat and 4 wheels)  Assistance: Contact guard assistance  Gait Deviations: None  Distance: 200'  Ambulation 2  Surface - 2: level tile  Device 2: No device  Assistance 2: Contact guard assistance  Gait Deviations: Slow Deidra  Distance: 20'  Stairs/Curb  Stairs?: No  Gait Deviations  Gait Deviations: None     Balance  Posture: Good  Sitting - Static: Good  Sitting - Dynamic: Good  Standing - Static: Good  Standing - Dynamic: Fair  Comments: (Better balance standing on R leg than L leg)  Other exercises  Other exercises?: Yes  Other exercises 1: Standing exercises: heel raises x10, mini squats x10, standing leg raise 10x ea side. Repeated after rest break seated while doing UE exercises.   Other exercises 2: Seated exercises: open/close hands 10x, arm curls/tricep pushes x10, alternating shoulder press x10 ea side     Plan Plan  Times per week: 5-6 visits weekly  Times per day: Daily  Current Treatment Recommendations: Balance Training, Endurance Training  Safety Devices  Type of devices: Gait belt(OT working on ADLs as PT was leaving the room)  Restraints  Initially in place: No           AM-PAC Score     AM-PAC Inpatient Mobility without Stair Climbing Raw Score : 19 (05/13/21 1531)  AM-PAC Inpatient without Stair Climbing T-Scale Score : 56.04 (05/13/21 1531)  Mobility Inpatient CMS 0-100% Score: 12.25 (05/13/21 1531)  Mobility Inpatient without Stair CMS G-Code Modifier : CI (05/13/21 1531)       Goals  Short term goals  Time Frame for Short term goals: 12 visits  Short term goal 1: Ambulate 100' independently w/ use of RW for home safety and maneuverability  Short term goal 2: Ambulate 48' independently w/o RW to demonstrate improved dynamic standing balance  Patient Goals   Patient goals : Go home       Therapy Time   Individual Concurrent Group Co-treatment   Time In 1415         Time Out 1500         Minutes 39                 SUZANNE Almodovar    This treatment/evaluation completed by signing SPT. Signing PT agrees with treatment and documentation.

## 2021-05-13 NOTE — TELEPHONE ENCOUNTER
From: Gary Singh  To: Bryan Rodas DO  Sent: 5/13/2021 3:34 PM EDT  Subject: Test Results Question    This is moms daughter Prince Arciniega. You may already know that she has a stroke but didnt know. It showed in her mri and dr Ankita Michael wanted her bright to [facility]. She was admitted and will be going to a rehab eventually. Shes got an ultrasound appt Tuesday that you ordered. I was wondering if she could get it done here?

## 2021-05-13 NOTE — CARE COORDINATION
[facility] Craw[facility] she informs me that  [facility] Val[facility] is out of network. She is checking on OON benefits vs in network benefits, she will call as soon as she knows. Will supply patient and family with list from insurance for possible second choice. Patient has chosen   1. [facility]  2. [facility]  3. [facility] [facility]  4. [facility] \  5.  [place] [employer]    Referrals sent to [facility]

## 2021-05-13 NOTE — PROGRESS NOTES
conservation techs. Pt w/ good return  REQUIRES OT FOLLOW UP: Yes  Activity Tolerance  Activity Tolerance: Patient Tolerated treatment well;Patient limited by fatigue  Safety Devices  Safety Devices in place: Yes  Type of devices: Call light within reach;Gait belt;Left in bed;Bed alarm in place  Restraints  Initially in place: No           Patient Diagnosis(es): The encounter diagnosis was Cerebrovascular accident (CVA), unspecified mechanism ([address] has no past medical history on file. has no past surgical history on file. Restrictions  Restrictions/Precautions  Restrictions/Precautions: Fall Risk, General Precautions, Up as Tolerated  Required Braces or Orthoses?: No    Subjective   General  Patient assessed for rehabilitation services?: Yes  Family / Caregiver Present: Yes(daughter)  Diagnosis: stroke determined by clinical assessment, acute/subacute infarct within superior L temporal lobe  General Comment  Comments: RN OK'd OT eval this date. Pt cooperative/agreeable to session.   Patient Currently in Pain: Denies  Pain Assessment  Pain Assessment: 0-10  Pain Level: 0  Vital Signs  Patient Currently in Pain: Denies     Social/Functional History  Social/Functional History  Lives With: Alone  [facility]  Home Layout: One level  Home Access: Elevator  Bathroom Shower/Tub: Walk-in shower  Bathroom Toilet: Standard  Bathroom Equipment: Grab bars in shower, Shower chair  Home Equipment: 4 wheeled walker/Rollator (uses PRN at baseline), daughter reports having RW for pt  ADL Assistance: Needs assistance(per pt, receives assistance from home health staff)  Homemaking Assistance: Needs assistance  Homemaking Responsibilities: Yes  Ambulation Assistance: Independent  Transfer Assistance: Independent  Active : No  Patient's  Info: daughters  Occupation: Retired  Leisure & Hobbies: playing checkers, taking walks       Objective   Vision: Within Functional Limits  Hearing: Exceptions to [facility]  Hearing Exceptions: Hard of hearing/hearing concerns    Orientation  Overall Orientation Status: Within Functional Limits  Observation/Palpation  Posture: Good  Balance  Sitting Balance: Stand by assistance(~15min seated unsupported at EOB and toilet, ~8 min seated supported in 4 wheeled walker)  Standing Balance: Stand by assistance  Standing Balance  Time: ~7min  Activity: standing EOB, sinkside, toiletside, func transfers, func mob around room to simulate household distances  Functional Mobility  Functional - Mobility Device: 4-Wheeled Walker(pt used 4 wheeled walker for func mob EOB>sink/toilet, pt used RW for func mob around room to simulate household distances. Pt demo'd good safety w/ use of rollator, which is what pt prefers)  Assist Level: Contact guard assistance  Functional Mobility Comments: no LOB, pt fatigued quickly  Toilet Transfers  Toilet - Technique: Ambulating  Equipment Used: Standard toilet  Toilet Transfer: Contact guard assistance  Toilet Transfers Comments: pt used R grab rail to simulate home environment  ADL  Feeding: Stand by assistance  Grooming: Contact guard assistance(Pt performed oral hygiene standing sinkside at CGA and hair brushing seated in 4 wheeled walker at sink at SBA d/t decreased activity tolerance. Pt reported transitioning to seated position d/t fatigue)  UE Bathing: Stand by assistance(Pt performed UE bathing seated on 4 wheeled walker at sink w/ SBA d/t decreased activity tolerance)  LE Bathing: Contact guard assistance  UE Dressing: Stand by assistance  LE Dressing: Contact guard assistance(Pt donned/doffed B socks seated EOB at CGA d/t pt report of unsteadiness this date.   Pt demo'd good fig 4 tech.)  Toileting: Contact guard assistance(Pt simulated toileting activity at [facility] for safety, using 4 wheeled walker, pt used R sided grab rail for controlled descent onto toilet to simulate home environment.)  Tone RUE  RUE Tone: Normotonic  Tone LUE  LUE Tone: Normotonic  Coordination  Movements Are Fluid And Coordinated: Yes     Bed mobility  Supine to Sit: Unable to assess(REESE, pt seated at EOB upon arrival)  Sit to Supine: Supervision  Scooting: Stand by assistance  Transfers  Sit to stand: Contact guard assistance  Stand to sit: Contact guard assistance  Transfer Comments: use of 4 wheeled walker/RW     Cognition  Overall Cognitive Status: WFL        Sensation  Overall Sensation Status: WFL(pt denies numbness/tingling)        LUE AROM (degrees)  LUE AROM : WFL  LUE General AROM: WFL  Left Hand AROM (degrees)  Left Hand AROM: WFL  Left Hand General AROM: WFL  RUE AROM (degrees)  RUE AROM : WFL  RUE General AROM: WFL  Right Hand AROM (degrees)  Right Hand AROM: WFL  Right Hand General AROM: WFL  LUE Strength  Gross LUE Strength: WFL  L Shoulder Flex: 5/5  L Elbow Flex: 5/5  L Elbow Ext: 5/5  L Hand General: 5/5  RUE Strength  R Shoulder Flex: 5/5  R Elbow Flex: 5/5  R Elbow Ext: 5/5  R Hand General: 5/5                   Plan   Plan  Times per week: 2-3x  Current Treatment Recommendations: Balance Training, Functional Mobility Training, Endurance Training, Patient/Caregiver Education & Training, Equipment Evaluation, Education, & procurement, Self-Care / ADL      AM-PAC Score        AM-[facility] Inpatient Daily Activity Raw Score: 21 (05/13/21 1602)  AM-PAC Inpatient ADL T-Scale Score : 44.27 (05/13/21 1602)  ADL Inpatient CMS 0-100% Score: 32.79 (05/13/21 1602)  ADL Inpatient CMS G-Code Modifier : Angi Jeong (05/13/21 1602)    Goals  Short term goals  Time Frame for Short term goals: Pt will by discharge  Short term goal 1: Demo UB ADLs at mod (I) w/ increased time and AE/DME PRN  Short term goal 2: Demo LB ADLs at SUP w/ increased time and AE/DME PRN  Short term goal 3: Demo func mob/transfers at [employer] w/ LRD PRN  Short term goal 4: Demo static/dynamic standing throughout all func activities for +15min w/ 1 seated rest break PRN  Short term goal 5: Demo/identify 2 EC techs throughout func activities w/ 0 VCs.        Therapy Time   Individual Concurrent Group Co-treatment   Time In 6930         Time Out 1530         Minutes 45         Timed Code Treatment Minutes: [address] [facility], S/OT

## 2021-05-13 NOTE — PLAN OF CARE
Problem: HEMODYNAMIC STATUS  Goal: Patient has stable vital signs and fluid balance  Outcome: Ongoing  Note: Neuro assessment completed, fall precautions in place, aspirations precautions in place, assess for barriers in communication and mobility, interventions to assist in communication and mobility in place, encouraged to call for assistance, adaptive devices used as needed, assess emotional state and support offered, encouraged patient to communicate by available means, and support systems included in patient care. Problem: Falls - Risk of:  Goal: Will remain free from falls  Description: Will remain free from falls  Outcome: Ongoing  Note: Patient remained free from injury. Patient verbalized understanding of need for the safety precautions. Demonstrates proper use of assistive devices. Bed remains in the lowest position. Call light remains within reach. [place] [facility] in use.      Goal: Absence of physical injury  Description: Absence of physical injury  Outcome: Ongoing

## 2021-05-13 NOTE — FLOWSHEET NOTE
[facility] [address]     Emergency/Trauma Note    PATIENT NAME: Brian Silva    Shift date: 5.12.2021  Shift day: Wednesday   Shift # 2    Room # 28/28   Name: Brian Silva            Age: 82 y.o. Gender: female          Gnosticist: [address] of [place]: unknown    Trauma/Incident type: Stroke Alert (Consult)  Admit Date & Time: 5/12/2021  7:03 PM  TRAUMA NAME: unknown    ADVANCE DIRECTIVES IN CHART? No    NAME OF DECISION MAKER: None    RELATIONSHIP OF DECISION MAKER TO PATIENT: None    PATIENT/EVENT DESCRIPTION:  Brian Silva is a 82 y.o. female who arrived as a STROKE CONSULT due to imaging that shows concern. Pt to be admitted to 28/28. SPIRITUAL ASSESSMENT/INTERVENTION:  Patient appears to be calm and coping. Daughter appears to be anxious and concerned. Daughter is concerned about the imaging of the patient and the possibility of stroke.  provided space for patient and daughter to express feelings, thoughts, and concerns. PATIENT BELONGINGS:  No belongings noted    ANY BELONGINGS OF SIGNIFICANT VALUE NOTED:  None    REGISTRATION STAFF NOTIFIED? Yes      WHAT IS YOUR SPIRITUAL CARE PLAN FOR THIS PATIENT?:  Chaplains will remain available to offer spiritual and emotional support as needed. Electronically signed by Gonzales Noriega on 5/12/2021 at 8:18 PM.  [employer]  [phone]       05/12/21 1930   Encounter Summary   Services provided to: Patient and family together   Referral/Consult From: Multi-disciplinary team   Support System Children   Continue Visiting   ([phone])   Complexity of Encounter Moderate   Length of Encounter 30 minutes   Spiritual Assessment Completed Yes   Crisis   Type Stroke Alert  (Consult)   Assessment Calm; Approachable;Coping   Intervention Active listening;Explored feelings, thoughts, concerns;Explored coping resources; Discussed illness/injury and it's impact   Outcome Expressed gratitude;Expressed feelings/needs/concerns;Engaged in conversation     Electronically signed by Mignon Steinberg on 5/12/2021 at 8:18 PM

## 2021-05-13 NOTE — CARE COORDINATION
Case Management Initial Discharge [address],             Met with:patient to discuss discharge plans. Information verified: address, contacts, phone number, , insurance Yes    Emergency Contact/Next of Kin name & number: COLLETTE Loving [phone]    PCP: Vishnu Carlson DO  Date of last visit: may 2021    Insurance Provider: [facility] medicare    Discharge Planning    Living Arrangements:  Alone   Support Systems:  [address] [place] [address] [place] [employer]    Home has 1 stories  Elevatorto get into front door, 0*stairs to climb to reach second floor  Location of bedroom/bathroom in home main    Patient able to perform ADL's:Independent    Current Services (outpatient & in home) Aid Services with [facility], requesting [place] if cannot go to [employer]  DME equipment: walker  DME provider:     Receiving oral anticoagulation therapy? No    If indicated:   Physician managing anticoagulation treatment:   Where does patient obtain lab work for ATC treatment? Potential Assistance Needed:  Home Care    Patient agreeable to home care: Yes  [place] of choice provided:  yes    Prior SNF/Rehab Placement and Facility:   Agreeable to SNF/Rehab: Yes  [place] of choice provided: yes     Evaluation: no    Expected Discharge date:  21    Patient expects to be discharged to: Independent Living/ Assisted Living  Follow Up Appointment: Best Day/ Time: Monday AM    Transportation provider: family  Transportation arrangements needed for discharge: Yes    Readmission Risk              Risk of Unplanned Readmission:        8             Does patient have a readmission risk score greater than 14?: No  If yes, follow-up appointment must be made within 7 days of discharge. Goals of Care:  mg      Discharge Plan: [facility]  Patient states that she lives in an independently  living,  She is in the process of going to an assisted living.   If she needs rehab she would like to go to [facility] Electronically signed by [place] RN on 5/13/21 at 12:04 PM EDT

## 2021-05-13 NOTE — PROGRESS NOTES
Physical Therapy    DATE: 2021    NAME: Lonny Nunez  MRN: [id number]   : 1935      Patient not seen this date for Physical Therapy due to:    Testing: Echo. Will check villa as time allows. Electronically signed by SUZANNE Burgos on 2021 at 11:34 AM  This treatment/evaluation completed by signing SPT. Signing PT agrees with treatment and documentation.

## 2021-05-13 NOTE — ED NOTES
Report called to [facility]. All questions answered.      Talya Almaguer RN  05/12/21 3915

## 2021-05-13 NOTE — ED NOTES
Pt arrives to ED with family. Pt has had a recent MRI and was found to be at high risk of a stroke. Pt has no complaints or defecits at this time. pts family states after covid she has been unable to live alone. Pt A&Ox4. According to family pt has episodes of slurred speech and confusion.  Pt currently does not have these deficits on assessment      Emely Garcia RN  05/12/21 2817

## 2021-05-13 NOTE — PROGRESS NOTES
using a walker at baseline. There is no weakness or numbness, no double vision or change in the vision, no change in speech, no seizures, no loss of consciousness, no dizziness, no headaches, no chest or abdominal pain, no nausea or vomiting. The patient was started on Aricept but could not tolerate the side effects of nausea and vomiting. The family stating that they are not sure if the patient is taking her medications as she is supposed to. The patient stated that she has a lot of stress anxiety because of her skin cancer on her nose returned back. MRI of the brain was ordered and it did show subacute punctuate stroke in the left superior temporal lobe. 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 Basic lab work-up for the dementia VDRL, vitamin B12 and folate, TSH, ESR were sent. EEG was ordered and was unremarkable.     In the ED, the patient was alert oriented x4 and vitally stable. Systolic blood pressure was around 190. Neurological exam was unremarkable. MMSE score was 28, was able to repeat 1 out of 3 words.      Of note, vascular carotid duplex ultrasound in 4/2019 showed less than 50% stenosis bilateral ICAs. Pain:  Pain Assessment  Pain Assessment: 0-10  Pain Level: 0  Patient's Stated Pain Goal: No pain  Non-Pharmaceutical Pain Intervention(s): Rest, Repositioned, Relaxation techniques, Environmental changes    Assessment:     Pt presents with mild cognitive deficits characterized by impaired word associations and thought flexibility. Pt. Reports she is functioning at her baseline status at this time. She lives in a senior community and does not drive. Pt. Presents with no dysarthria, no O/M deficits at this time. ST to follow up and provide treatment to address noted deficits. Education provided. No therapy recommended at discharge.       Recommendations:  Requires SLP Intervention: No  Duration/Frequency of Treatment: no need  D/C Recommendations: Home with intermittent assistance     Patient/family involved in developing goals and treatment plan: yes    Subjective:   Previous level of function and limitations: live in senior community  General  Chart Reviewed: Yes  Social/Functional History  Lives With: Other (comment)(senior condo)              Objective:     Oral/Motor  Oral Motor: Within functional limits      Motor Speech  Motor Speech:  Within Functional Limits         Cognition:      Orientation  Overall Orientation Status: Within Functional Limits  Attention  Attention: Within Functional Limits  Memory  Memory: Within Funtional Limits  Problem Solving  Problem Solving: [facility]  Abstract Reasoning  Abstract Reasoning: Within Functional Limits  Safety/Judgement  Safety/Judgement: Exceptions to WFL(3/4 word associations)  Flexibility of Thought: Mild(2/3)      Prognosis:  Speech Therapy Prognosis  Prognosis: Good  Individuals consulted  Consulted and agree with results and recommendations: Patient    Education:  Patient Education: yes  Patient Education Response: Verbalizes understanding          Therapy Time:   Individual Concurrent Group Co-treatment   Time In 1410         Time Out 1420         Minutes 10                 Leslye Nash SLP  5/13/2021 2:30 PM

## 2021-05-14 LAB
SARS-COV-2, RAPID: NOT DETECTED
SPECIMEN DESCRIPTION: NORMAL

## 2021-05-14 PROCEDURE — 97110 THERAPEUTIC EXERCISES: CPT

## 2021-05-14 PROCEDURE — 6370000000 HC RX 637 (ALT 250 FOR IP): Performed by: STUDENT IN AN ORGANIZED HEALTH CARE EDUCATION/TRAINING PROGRAM

## 2021-05-14 PROCEDURE — 99233 SBSQ HOSP IP/OBS HIGH 50: CPT | Performed by: PSYCHIATRY & NEUROLOGY

## 2021-05-14 PROCEDURE — 97116 GAIT TRAINING THERAPY: CPT

## 2021-05-14 PROCEDURE — 87635 SARS-COV-2 COVID-19 AMP PRB: CPT

## 2021-05-14 PROCEDURE — 2709999900 HC NON-CHARGEABLE SUPPLY

## 2021-05-14 PROCEDURE — 1200000000 HC SEMI PRIVATE

## 2021-05-14 PROCEDURE — 2580000003 HC RX 258: Performed by: STUDENT IN AN ORGANIZED HEALTH CARE EDUCATION/TRAINING PROGRAM

## 2021-05-14 PROCEDURE — 94761 N-INVAS EAR/PLS OXIMETRY MLT: CPT

## 2021-05-14 RX ORDER — DOCUSATE SODIUM 100 MG/1
100 CAPSULE, LIQUID FILLED ORAL 2 TIMES DAILY
Status: DISCONTINUED | OUTPATIENT
Start: 2021-05-14 | End: 2021-05-18 | Stop reason: HOSPADM

## 2021-05-14 RX ORDER — CLOPIDOGREL BISULFATE 75 MG/1
75 TABLET ORAL DAILY
Status: DISCONTINUED | OUTPATIENT
Start: 2021-05-14 | End: 2021-05-18 | Stop reason: HOSPADM

## 2021-05-14 RX ORDER — CLOPIDOGREL BISULFATE 75 MG/1
300 TABLET ORAL ONCE
Status: COMPLETED | OUTPATIENT
Start: 2021-05-14 | End: 2021-05-14

## 2021-05-14 RX ADMIN — CLOPIDOGREL 75 MG: 75 TABLET, FILM COATED ORAL at 17:47

## 2021-05-14 RX ADMIN — APIXABAN 2.5 MG: 2.5 TABLET, FILM COATED ORAL at 08:51

## 2021-05-14 RX ADMIN — AMLODIPINE BESYLATE 2.5 MG: 2.5 TABLET ORAL at 08:51

## 2021-05-14 RX ADMIN — DOCUSATE SODIUM 100 MG: 100 CAPSULE ORAL at 08:50

## 2021-05-14 RX ADMIN — PANCRELIPASE 12000 UNITS: 30000; 6000; 19000 CAPSULE, DELAYED RELEASE PELLETS ORAL at 13:26

## 2021-05-14 RX ADMIN — PANCRELIPASE 12000 UNITS: 30000; 6000; 19000 CAPSULE, DELAYED RELEASE PELLETS ORAL at 08:50

## 2021-05-14 RX ADMIN — MAGNESIUM GLUCONATE 500 MG ORAL TABLET 400 MG: 500 TABLET ORAL at 20:29

## 2021-05-14 RX ADMIN — LEVOTHYROXINE SODIUM 75 MCG: 75 TABLET ORAL at 08:51

## 2021-05-14 RX ADMIN — SODIUM CHLORIDE, PRESERVATIVE FREE 10 ML: 5 INJECTION INTRAVENOUS at 20:30

## 2021-05-14 RX ADMIN — CLOPIDOGREL 300 MG: 75 TABLET, FILM COATED ORAL at 00:38

## 2021-05-14 RX ADMIN — MAGNESIUM GLUCONATE 500 MG ORAL TABLET 400 MG: 500 TABLET ORAL at 08:50

## 2021-05-14 RX ADMIN — PANTOPRAZOLE SODIUM 40 MG: 40 TABLET, DELAYED RELEASE ORAL at 08:51

## 2021-05-14 RX ADMIN — DOCUSATE SODIUM 100 MG: 100 CAPSULE ORAL at 21:39

## 2021-05-14 RX ADMIN — PANCRELIPASE 12000 UNITS: 30000; 6000; 19000 CAPSULE, DELAYED RELEASE PELLETS ORAL at 17:47

## 2021-05-14 RX ADMIN — ROSUVASTATIN CALCIUM 20 MG: 20 TABLET, FILM COATED ORAL at 20:29

## 2021-05-14 RX ADMIN — Medication 81 MG: at 08:50

## 2021-05-14 ASSESSMENT — PAIN SCALES - GENERAL: PAINLEVEL_OUTOF10: 0

## 2021-05-14 NOTE — PROGRESS NOTES
Neurology Resident Progress Note      SUBJECTIVE:  This is a 82 y.o.  female admitted 5/12/2021 for Stroke determined by clinical assessment [facility]) [I63.9]  This is a follow-up neurology progress note. The patient was seen and examined and the chart was reviewed. There were no acute events overnight. ROS  Constitutional: no fever, chills, fatigue  HENT: No change in vision or hearing   Respiratory: No cough, SOB, wheezing. Cardiovascular:  No chest pain, palpitations, leg swelling. Gastrointestinal: No nausea, vomiting, diarrhea. Genitourinary: No increased frequency, urgency. Musculoskeletal: No myalgia or arthralgia. Skin: No rashes or scarring or bruises. Neurological: No headache, paresthesia, or focal weakness. Endo/Heme/Allergies: Negative for itchy eyes or runny nose. Psychiatric/Behavioral: No anxiety or depressed mood. HPI  82years old female admitted on 5/12/2021 with complaint of short-term memory difficulties, since she got COVID-19 infection on Thanksgiving, she has had a syncopal event along with memory complaint to be admitted to [facility] for 5 days, remote complaints have persisted, patient placed on Aricept to tolerate having nightmares    -MRI 5/12/2021: Acute/subacute 5 mm cortical infarction within the superior left temporal lobe,  -CTA head and neck 5/12/2021: Severe 80% stenosis at the origin of left cervical ICA, mild stenosis at the origin of left vertebral artery which statically arises from the arch  -EEG 5/6/2021:  Within normal limits     clopidogrel  75 mg Oral Daily    docusate sodium  100 mg Oral BID    rosuvastatin  20 mg Oral Nightly    amLODIPine  2.5 mg Oral Daily    apixaban  2.5 mg Oral BID    levothyroxine  75 mcg Oral Daily    lipase-protease-amylase  12,000 Units Oral TID WC    magnesium oxide  400 mg Oral BID    pantoprazole  40 mg Oral QAM AC    sodium chloride flush  5-40 mL Intravenous 2 times per day    aspirin  81 mg Oral Daily    Or  aspirin  300 mg Rectal Daily       History reviewed. No pertinent past medical history. History reviewed. No pertinent surgical history. PHYSICAL EXAM:      Blood pressure (!) 158/68, pulse 54, temperature 95.5 °F (35.3 °C), temperature source Oral, resp. rate 16, height 5' 5\" (1.651 m), weight 121 lb 4.1 oz (55 kg), SpO2 99 %. NEUROLOGIC EXAMINATION  GENERAL  Appears comfortable and in no distress   HEENT  NC/ AT   NECK  Supple and no bruits heard   MENTAL STATUS:  Alert, oriented, affected short-term memory, was able to repeat 1 out of 3 words, no confusion, normal speech, normal language, no hallucination or delusion. MMSE was 28. CRANIAL NERVES: II     -      Visual fields intact to confrontation  III,IV,VI -  EOMs full, no afferent defect, no DORIE, no ptosis  V     -     Normal facial sensation  VII    -     Normal facial symmetry  VIII   -     Intact hearing  IX,X -     Symmetrical palate  XI    -     Symmetrical shoulder shrug  XII   -     Midline tongue, no atrophy    MOTOR FUNCTION:  significant for good strength of grade 5/5 in bilateral proximal and distal muscle groups of both upper and lower extremities with normal bulk, normal tone and no involuntary movements, no tremor   SENSORY FUNCTION:  Normal touch, normal pin, normal vibration, normal proprioception   CEREBELLAR FUNCTION:  Intact fine motor control over upper limbs   REFLEX FUNCTION:  Symmetric, no perverted reflex, no Babinski sign   STATION and GAIT  Not tested          Investigations:      Laboratory Testing:  No results found for this or any previous visit (from the past 24 hour(s)).     Imaging/Diagnostics:  Echo Limited    Result Date: 5/13/2021  Transthoracic Echocardiography Report (TTE)  Patient Name SLEE        Date of Study             05/13/2021               Sukhietta Crease   Date of      1935  Gender                    Female  Birth   Age          82 year(s)  Race                         Room Number  7359 Height:                   65 inch, 165.1 cm   Corporate ID [id number]    Weight:                   121 pounds, 54.9 kg  #   Patient Acct [de-identified]   BSA:         1.6 m^2      BMI:        20.14  #                                                              kg/m^2   MR #         [id number]     Sonographer               Lizabeth Trevino   Accession #  [id number]  Interpreting Physician    Roberto Faith   Fellow                   Referring Nurse                           Practitioner   Interpreting             Referring Physician       Colby Goetz  Fellow                                             *  Type of Study   TTE procedure:2D Echocardiogram, M-Mode, Doppler, Color Doppler, Bubble  Study. Procedure Date Date: 05/13/2021 Start: 11:03 AM Study Location: [facility] Technical Quality: Fair visualization Indications:CVA. History / Tech. Comments: Procedure explained to patient. H/o COVID. Patient Status: Inpatient Height: 65 inches Weight: 121 pounds BSA: 1.6 m^2 BMI: 20.14 kg/m^2 CONCLUSIONS Summary Left ventricle is normal in size Global left ventricular systolic function is normal Estimated ejection fraction is 65 % . Moderate left ventricular hypertrophy. Signature ----------------------------------------------------------------------------  Electronically signed by Radha Mcpherson on 05/13/2021 11:53  AM ---------------------------------------------------------------------------- ----------------------------------------------------------------------------  Electronically signed by Roberto Faith(Interpreting physician) on  05/13/2021 05:16 PM ---------------------------------------------------------------------------- FINDINGS Left Atrium Left atrium is normal in size. No evidence of patent foramen ovale by injection of agitated saline. Left Ventricle Left ventricle is normal in size Global left ventricular systolic function is normal Estimated ejection fraction is 65 % .  Moderate left ventricular hypertrophy. Right Atrium Right atrium is normal in size. Right Ventricle Normal right ventricular size and function. Mitral Valve Thickened mitral valve leaflets. No mitral regurgitation. Aortic Valve Aortic valve is sclerotic but opens well. Aortic valve is trileaflet. No aortic insufficiency. Tricuspid Valve Normal tricuspid valve structure and function. Trivial tricuspid regurgitation. Pulmonic Valve The pulmonic valve is normal in structure. No pulmonic insufficiency. Pericardial Effusion No significant pericardial effusion is seen. Miscellaneous Normal aortic root dimension. E/E' average = 10.2. IVC normal diameter & inspiratory collapse indicating normal RA filling pressure .  M-mode / 2D Measurements & Calculations:   LVIDd:3.9 cm(3.7 - 5.6 cm)       Diastolic TLTIPT:35.2 ml  YNTDX:5.5 cm(2.2 - 4.0 cm)       Systolic LAWLGS:01.5 ml  FQEX:1.1 cm(0.6 - 1.1 cm)        Aortic Root:3 cm(2.0 - 3.7 cm)  LVPWd:1.2 cm(0.6 - 1.1 cm)       LA Dimension: 2.9 cm(1.9 - 4.0 cm)  Fractional Shortenin.03 %    LA volume/Index: 26.63 ml /17m^2  Calculated LVEF (%): 79.43 %                                   RVDd:2.5 cm   Mitral:                                    Aortic   Valve Area (P1/2-Time): 2.65 cm^2          Peak Velocity: 1.24 m/s  Peak E-Wave: 0.74 m/s                      Mean Velocity: 0.87 m/s  Peak A-Wave: 0.66 m/s                      Peak Gradient: 6.15 mmHg  E/A Ratio: 1.12                            Mean Gradient: 3 mmHg  Peak Gradient: 2.17 mmHg  Mean Gradient: 1 mmHg  Deceleration Time: 282 msec                AV VTI: 30.3 cm  P1/2t: 83 msec   Mean Velocity: 0.49 m/s   Tricuspid:                                 Pulmonic:   Peak TR Velocity: 2.11 m/s                 Peak Velocity: 0.71 m/s  Peak TR Gradient: 17.8084 mmHg             Peak Gradient: 1.99 mmHg  Diastology / Tissue Doppler Septal Wall E' velocity:0.07 m/s Septal Wall E/E':10.9 Lateral Wall E' velocity:0.08 m/s Lateral Wall E/E':9.5    Ct Head Wo Contrast    Result Date: 2021  EXAMINATION: CT OF THE HEAD WITHOUT CONTRAST  2021 8:45 pm TECHNIQUE: CT of the head was performed without the administration of intravenous contrast. Dose modulation, iterative reconstruction, and/or weight based adjustment of the mA/kV was utilized to reduce the radiation dose to as low as reasonably achievable. COMPARISON: MR brain from today HISTORY: ORDERING SYSTEM PROVIDED HISTORY: stroke consult, left temporal stroke on MRI TECHNOLOGIST PROVIDED HISTORY: stroke consult, left temporal stroke on MRI Reason for Exam: stroke consult,lt temporal stroke on mri Acuity: Unknown Type of Exam: Unknown FINDINGS: BRAIN/VENTRICLES: There is no acute intracranial hemorrhage, mass effect or midline shift. No abnormal extra-axial fluid collection. The gray-white differentiation is maintained without evidence of an acute infarct. There is no evidence of hydrocephalus. Idiopathic calcifications in the basal ganglia. Intracranial atherosclerosis. ORBITS: The visualized portion of the orbits demonstrate no acute abnormality. SINUSES: Air-fluid level right maxillary sinus. Polyp left maxillary sinus. SOFT TISSUES/SKULL:  No acute abnormality of the visualized skull or soft tissues. Idiopathic basal ganglia calcifications. Sinusitis. Otherwise no acute disease. Xr Chest Portable    Result Date: 2021  Evaluate for Atelectasis [facility] Department of Radiology [address], [place] [phone]    ========================================================================== Patient Name: Phong Wolfe : 1935 Sex: F Age: Kelsie العلي MRN: [id number] Pt. Location: [facility] Patient Status: E Visit #: [id number] Ordered Date: 2021 4:00:00 PM Completed Date: 2021 04:29 PM Requesting Provider: Bill Diaz Attending Provider: Maegan Dixon Report Copy To:  Signs ^ Symptoms: Shortness of Breath injury. No significant stenosis of the brachiocephalic or subclavian arteries. CAROTID ARTERIES: There is moderate atherosclerotic calcification of left carotid bulb extending into the origin of left internal carotid artery resulting in severe stenosis at the origin of left cervical ICA with residual lumen measuring 2 mm, consistent with 80% stenosis. Mild atherosclerotic disease of right carotid bulb extending into the origin of right internal carotid artery not resulting in hemodynamically significant stenosis. VERTEBRAL ARTERIES: No dissection, arterial injury, or significant stenosis. SOFT TISSUES: The lung apices are clear. No cervical or superior mediastinal lymphadenopathy. The larynx and pharynx are unremarkable. No acute abnormality of the salivary and thyroid glands. BONES: No acute osseous abnormality. 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 No significant stenosis of the intracranial internal carotid, anterior cerebral, or middle cerebral arteries. No aneurysm. POSTERIOR CIRCULATION: No significant stenosis of the vertebral, basilar, or posterior cerebral arteries. No aneurysm. 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 OTHER: No dural venous sinus thrombosis on this non-dedicated study. BRAIN: No mass effect or midline shift. No extra-axial fluid collection. The gray-white differentiation is maintained. Moderate mucosal thickening of maxillary sinuses. Severe 80% stenosis at the origin of left cervical ICA. Mild stenosis at the origin of left vertebral artery which directly arises from the arch. Unremarkable CTA of the head with anatomical variation as described. ================================== CAROTID STENOSIS REFERENCE: The [employer] Symptomatic Carotid Endarterectomy Trial (NASCET) is a method of quantifying internal carotid artery stenosis.  Distal internal carotid artery diameter as the denominator for stenosis measurement: MILD = <50% stenosis. MODERATE =  50-69% stenosis. SEVERE =  70-89% stenosis. HAIRLINE/CRITICAL = 90-99% stenosis. OCCLUDED = 100% stenosis. ==================================     Mri Brain Wo Contrast    Result Date: 5/12/2021  EXAMINATION: MRI OF THE BRAIN WITHOUT CONTRAST  5/12/2021 2:02 pm TECHNIQUE: Multiplanar multisequence MRI of the brain was performed without the administration of intravenous contrast. COMPARISON: None. HISTORY: ORDERING SYSTEM PROVIDED HISTORY: 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 TECHNOLOGIST PROVIDED HISTORY: Reason for Exam: patient states dizziness and nausea x1 month FINDINGS: INTRACRANIAL STRUCTURES/VENTRICLES: There is a 5 mm focus of restricted diffusion within the cortex of the superior left temporal lobe consistent with an acute/subacute infarct. There is corresponding abnormal increased T2/FLAIR signal intensity. There is no acute intracranial hemorrhage. No mass effect or midline shift is present. There are multiple foci of abnormal increased T2/FLAIR signal intensity within the periventricular and deep white matter of both hemispheres. The brain is otherwise of normal signal intensities. There is no sellar or suprasellar mass present. There is no ventriculomegaly or abnormal extra-axial fluid collection present. The proximal portions of the [place] demonstrate normal flow voids. ORBITS: Limited evaluation of the orbits is unremarkable. SINUSES: There is mild mucosal thickening in the maxillary sinuses. The paranasal sinuses and mastoid air cells are otherwise clear. No fluid levels are identified. BONES/SOFT TISSUES: Bone marrow signal intensity is normal.     1. Acute/subacute 5 mm cortical infarct within the superior left temporal lobe. 2. No acute intracranial hemorrhage. 3. Mild chronic small vessel ischemic changes.  The findings were sent to the Radiology Results [id number] at 3:24 pm on 5/12/2021to be communicated to a licensed caregiver. Assessment & Differential Dx:      Primary Problem  <principal problem not specified>    Active Hospital Problems    Diagnosis Date Noted    Acute ischemic stroke ([facility] [address] [I63.9]     Cognitive impairment [R41.89]     Cerebrovascular accident (CVA) ([facility] [address] [I63.9]     Stroke determined by clinical assessment ([facility] [address] [I63.9] 2021     82years old female admitted on 2021 with complaint of short-term memory difficulties, since she got COVID-19 infection on , she has had a syncopal event along with memory complaint to be admitted to [facility] for [facility] days, remote complaints have persisted, patient placed on Aricept to tolerate having nightmares    -MRI 2021: Acute/subacute 5 mm cortical infarction within the superior left temporal lobe,  -CTA head and neck 2021: Severe 80% stenosis at the origin of left cervical ICA, mild stenosis at the origin of left vertebral artery which statically arises from the arch  -EEG 2021:  Within normal limits    Impression:  -Acute left temporal lobe infarction shown on MRI  -Severe stenosis at the origin of L cervical ICA shown on CTA  -Mild cognitive impairment  -Recent COVID-19 infection  -Anxiety/depression  -Comorbid A. fib on Eliquis 2.5 mg twice daily      Plan:     -Endovascular neurology consulted: N.p.o. after midnight on 2021 for possible diagnostic angiography and possible stenting on Saturday morning, stop evening dose of Eliquis on Friday, 2021, continue Plavix 75 mg  -Patient was loaded with aspirin 324 mg then maintenance dose of 81 mg  -Patient on Eliquis, will be on hold today for possible diagnostic angiogram tomorrow  -Resume atorvastatin  -Folate: 11.2 B12: 695 TSH:  -Lipid panel: LDL: 51, T, cholesterol: 123, HDL: 61,  -A1c: 5.4, ESR: 15,  -PT/OT/SLP  -Echocardiogram: EF: 65%, moderate left ventricular hypertrophy, no structural defects        Michel Myers MD, 2021 9:14 AM

## 2021-05-14 NOTE — CONSULTS
[facility] [address] [facility] note            Date:   5/13/2021  Patient name:  Shanita Molina  Date of admission:  5/12/2021  7:03 PM  MRN:   [id number]  Account:  [de-identified]  YOB: 1935  PCP:    Leon Cool DO  Room:   6191/2703-06  Code Status:    Full Code    Chief Complaint:     Chief Complaint   Patient presents with    Other     sent by  for abnormal mri result       History Obtained From:     patient, family member -daughter and son, electronic medical record, medical staff    History of Present Illness: The patient is a 82 y.o. Non-/non  female who presents with Other (sent by  for abnormal mri result)   and she is admitted to the hospital for the management of memory issues. The patient mentioned above with past medical history of A. fib on Eliquis 2.5 mg twice daily, hypothyroidism, hyperlipidemia, hypertension, chronic hyponatremia, was referred from Dr. Marcelo Laguna office for abnormal MRI. The patient was seen by Dr. Marcelo Laguna on 4/27/2021 for memory complaints. Family and the patient stated that she has short-term memory being affected in the last year. 1-1/2-year ago, the patient was completely independent and was driving car. After that, she started to have memory issues, and able to drive a car. In November last year, the patient was infected with COVID-19 and required 3 weeks of hospitalization and rehabilitation. The family stated that after discharge, the patient memory was severely affected, the patient now is dependent on most of the daily activities, she required home health care to come on daily basis. She is using a walker at baseline. There is no weakness or numbness, no double vision or change in the vision, no change in speech, no seizures, no loss of consciousness, no dizziness, no headaches, no chest or abdominal pain, no nausea or vomiting.   The patient was started on Aricept but could not tolerate the side effects of nausea and vomiting. The family stating that they are not sure if the patient is taking her medications as she is supposed to. The patient stated that she has a lot of stress anxiety because of her skin cancer on her nose returned back. MRI of the brain was ordered and it did show subacute punctuate stroke in the left superior temporal lobe. 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 Basic lab work-up for the dementia VDRL, vitamin B12 and folate, TSH, ESR were sent. EEG was ordered and was unremarkable. In the ED, the patient was alert oriented x4 and vitally stable. Systolic blood pressure was around 190. Neurological exam was unremarkable. MMSE score was 28, was able to repeat 1 out of 3 words. Of note, vascular carotid duplex ultrasound in 2019 showed less than 50% stenosis bilateral ICAs. NIH Stroke Scale Total (if not done complete detailed one below):    1a.  Level of consciousness:  0 - alert; keenly responsive  1b. Level of consciousness questions:  0 - answers both questions correctly  1c. Level of consciousness questions:  0 - performs both tasks correctly  2. Best Gaze:  0 - normal  3. Visual:  0 - no visual loss  4. Facial Palsy:  0 - normal symmetric movement  5a. Motor left arm:  0 - no drift, limb holds 90 (or 45) degrees for full 10 seconds  5b. Motor right arm:  0 - no drift, limb holds 90 (or 45) degrees for full 10 seconds  6a. Motor left le - no drift; leg holds 30 degree position for full 5 seconds  6b. Motor right le - no drift; leg holds 30 degree position for full 5 seconds  7. Limb Ataxia:  0 - absent  8. Sensory:  0 - normal; no sensory loss  9. Best Language:  0 - no aphasia, normal  10. Dysarthria:  0 - normal  11.   Extinction and Inattention:  0 - no abnormality      NIHSS of 0    Past Medical History:     History reviewed. No pertinent past medical history. Past Surgical History:     History reviewed. No pertinent surgical history. Medications Prior to Admission:     Prior to Admission medications    Medication Sig Start Date End Date Taking? Authorizing Provider   pantoprazole (PROTONIX) 40 MG tablet take 1 tablet by mouth once daily 4/27/21   Caridad Nova DO   Cholecalciferol (VITAMIN D3 PO) Take by mouth daily    Historical Provider, MD   Cyanocobalamin (VITAMIN B-12 PO) Take by mouth daily    Historical Provider, MD   simvastatin (ZOCOR) 10 MG tablet take 1 tablet by mouth every evening 4/14/21   Caridad Nova DO   levothyroxine (SYNTHROID) 75 MCG tablet take 1 tablet by mouth once daily 3/22/21   Caridad Nova,    oxybutynin (DITROPAN XL) 10 MG extended release tablet Take 1 tablet by mouth daily  Patient not taking: Reported on 4/27/2021 2/25/21   Caridad Nova DO   hydrocortisone (PROCTO-MED HC) 2.5 % CREA rectal cream apply 1 applicatorful rectally four times a day if needed for HEMORRHOIDS 2/22/21   Caridad Nova DO   ELIQUIS 2.5 MG TABS tablet take 1 tablet by mouth twice a day 2/16/21   Caridad Nova DO   apixaban (ELIQUIS) 2.5 MG TABS tablet Take 1 tablet by mouth 2 times daily 2/16/21   Caridad Nova DO   docusate sodium (COLACE) 100 MG capsule Take 100 mg by mouth 2 times daily    Historical Provider, MD   promethazine (PHENERGAN) 25 MG tablet take 1 tablet by mouth three times a day 30 MINUTES PRIOR TO MEALS for 10 days 9/29/20   Historical Provider, MD   nystatin (MYCOSTATIN) 765871 UNIT/GM cream Apply topically 2 times daily.  8/28/20   Caridad Nova DO   lipase-protease-amylase (CREON) 39382 units delayed release capsule Take 1 capsule by mouth 3 times daily (with meals) 8/6/20   Caridad Nova DO   Magnesium Oxide 250 MG TABS Take 400 mg by mouth 2 times daily     Historical Provider, MD        Allergies:     Percocet [oxycodone-acetaminophen] and Hydrocodone    Social History: Tobacco:    reports that she has never smoked. She has never used smokeless tobacco.  Alcohol:      reports current alcohol use. Drug Use:  reports no history of drug use. Family History:     History reviewed. No pertinent family history. Review of Systems:     ROS:  Constitutional  Negative for fever and chills    HEENT  Negative for ear discharge, ear pain, nosebleed    Eyes  Negative for photophobia, pain and discharge    Respiratory  Negative for hemoptysis and sputum    Cardiovascular  Negative for orthopnea, claudication and PND    Gastrointestinal  Negative for abdominal pain, diarrhea, blood in stool    Musculoskeletal  Negative for joint pain, negative for myalgia    Neurology  memory issues. Skin  Negative for rash or itching    Endo/heme/allergies  Negative for polydipsia, environmental allergy    Psychiatric/behavioral  Negative for suicidal ideation. Patient is not anxious        Physical Exam:   BP (!) 141/58   Pulse 70   Temp 97.7 °F (36.5 °C) (Oral)   Resp 17   Ht 5' 5\" (1.651 m)   Wt 121 lb 4.1 oz (55 kg)   SpO2 99%   BMI 20.18 kg/m²   Temp (24hrs), Av.8 °F (36.6 °C), Min:97.1 °F (36.2 °C), Max:98.1 °F (36.7 °C)    No results for input(s): POCGLU in the last 72 hours. Intake/Output Summary (Last 24 hours) at 2021  Last data filed at 2021 0430  Gross per 24 hour   Intake 615 ml   Output --   Net 615 ml         NEUROLOGIC EXAMINATION  GENERAL  Appears comfortable and in no distress   HEENT  NC/ AT   NECK  Supple and no bruits heard   MENTAL STATUS:  Alert, oriented, affected short-term memory, was able to repeat 1 out of 3 words, no confusion, normal speech, normal language, no hallucination or delusion. MMSE was 28.    CRANIAL NERVES: II     -      Visual fields intact to confrontation  III,IV,VI -  EOMs full, no afferent defect, no DORIE, no ptosis  V     -     Normal facial sensation  VII    -     Normal facial symmetry  VIII   -     Intact hearing  IX,X -  >60 >60 mL/min    GFR Comment          GFR Staging NOT REPORTED    Sedimentation Rate    Collection Time: 05/13/21  6:14 AM   Result Value Ref Range    Sed Rate 15 0 - 30 mm   T. pallidum Ab    Collection Time: 05/13/21  6:14 AM   Result Value Ref Range    T. pallidum, IgG NONREACTIVE NONREACTIVE   VITAMIN B12 & FOLATE    Collection Time: 05/13/21  6:14 AM   Result Value Ref Range    Vitamin B-12 695 232 - 1245 pg/mL    Folate 11.2 >4.8 ng/mL   Vitamin D 25 Hydroxy    Collection Time: 05/13/21  6:14 AM   Result Value Ref Range    Vit D, 25-Hydroxy 26.3 (L) 30.0 - 100.0 ng/mL         Assessment :      Primary Problem  Mild cognitive impairment. Active Hospital Problems    Diagnosis Date Noted    Acute ischemic stroke ([facility] [address] [I63.9]     Cognitive impairment [R41.89]     Cerebrovascular accident (CVA) ([address] [I63.9]     Stroke determined by clinical assessment ([facility] [address] [I63.9] 05/12/2021       Plan:     The patient mentioned above with past medical history of A. fib on Eliquis 2.5 mg twice daily, hypothyroidism, hyperlipidemia, hypertension, chronic hyponatremia, was referred from Dr. Vicki Banegas office for abnormal MRI. Left punctuate temporal lobe stroke. MMSE is 28. 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 MRI with left temporal stroke  CTA showed severe stenosis of the origin of the left cervical ICA. The patient was loaded with aspirin 324 mg, to continue aspirin 81 mg daily. Continue Eliquis 2.5 mg twice daily. Resume Lipitor  While the patient with 300 mg of Plavix, continue Plavix 75 mg starting from tomorrow. Stop the evening dose of Eliquis on Friday, 5/14/2021. On Norvasc 2.5 mg daily for high blood pressure. N.p.o. after midnight on Friday, possible diagnostic angiography and possible stenting on Saturday morning.   Discussed with Dr. Gabrielle Villa further

## 2021-05-14 NOTE — PROGRESS NOTES
ENDOVASCULAR NEUROSURGERY PROGRESS NOTE  5/14/2021 6:35 PM  Subjective:   Admit Date: 5/12/2021  PCP: Alla Villaseñor DO    Patient was loaded with aspirin and Plavix. Holding Eliquis this evening and tomorrow a.m. Objective:   Vitals: BP (!) 140/72   Pulse 63   Temp 97.4 °F (36.3 °C) (Oral)   Resp 17   Ht 5' 5\" (1.651 m)   Wt 121 lb 4.1 oz (55 kg)   SpO2 96%   BMI 20.18 kg/m²   General appearance: Lying in bed, NAD. HEENT: Atraumatic. Neck: Neck is supple. Lungs: No respiratory distress noted. Heart: normal sinus rhythm on tele. .   Abdomen: Soft nontender. Extremities: No lower limb edema noted. Neurologic:  awake, following simple commands appropriately, able to name simple objects, intact comprehension, no dysarthria noted. CN: Has intact extraocular muscles movements, no facial droop noted  MOTOR: Has good strength in both upper and lower extremities, moving both upper and lower extremities against gravity with no drift. SENSORY: Intact sensation to simple touch bilaterally in both upper and lower extremities.     Medications and labs:   Scheduled Meds:   clopidogrel  75 mg Oral Daily    docusate sodium  100 mg Oral BID    rosuvastatin  20 mg Oral Nightly    amLODIPine  2.5 mg Oral Daily    [Held by provider] apixaban  2.5 mg Oral BID    levothyroxine  75 mcg Oral Daily    lipase-protease-amylase  12,000 Units Oral TID WC    magnesium oxide  400 mg Oral BID    pantoprazole  40 mg Oral QAM AC    sodium chloride flush  5-40 mL Intravenous 2 times per day    aspirin  81 mg Oral Daily    Or    aspirin  300 mg Rectal Daily     Continuous Infusions:   sodium chloride      sodium chloride 75 mL/hr at 05/12/21 2228     CBC:   Recent Labs     05/12/21 1947 05/13/21  0614   WBC 8.5 6.4   HGB 12.7 11.9    224     BMP:    Recent Labs     05/12/21 1947 05/13/21  0614   * 133*   K 4.2 4.1   CL 96* 102   CO2 24 24   BUN 11 11   CREATININE 0.77 0.81   GLUCOSE 97 84 Hepatic: No results for input(s): AST, ALT, ALB, BILITOT, ALKPHOS in the last 72 hours. Troponin: No results for input(s): TROPONINI in the last 72 hours. BNP: No results for input(s): BNP in the last 72 hours. Lipids:   Recent Labs     05/12/21  2234   CHOL 123   HDL 61     INR:   Recent Labs     05/12/21  1947   INR 1.0     Cta Head Neck W Contrast     Result Date: 5/12/2021  EXAMINATION: CTA OF THE HEAD AND NECK WITH CONTRAST 5/12/2021 8:36 pm: TECHNIQUE: CTA of the head and neck was performed with the administration of intravenous contrast. Multiplanar reformatted images are provided for review. MIP images are provided for review. Stenosis of the internal carotid arteries measured using NASCET criteria. Dose modulation, iterative reconstruction, and/or weight based adjustment of the mA/kV was utilized to reduce the radiation dose to as low as reasonably achievable. COMPARISON: None. HISTORY: ORDERING SYSTEM PROVIDED HISTORY: L temporal stroke. TECHNOLOGIST PROVIDED HISTORY: L temporal stroke. Decision Support Exception - unselect if not a suspected or confirmed emergency medical condition->Emergency Medical Condition (MA) Reason for Exam: stroke consult,lt infarct seen on mri Acuity: Unknown Type of Exam: Unknown FINDINGS: CTA NECK: AORTIC ARCH/ARCH VESSELS: Left vertebral artery directly arises from the aortic arch, anatomical variation. Mild atherosclerotic disease at the origin of left vertebral artery from the arch. No dissection or arterial injury. No significant stenosis of the brachiocephalic or subclavian arteries. CAROTID ARTERIES: There is moderate atherosclerotic calcification of left carotid bulb extending into the origin of left internal carotid artery resulting in severe stenosis at the origin of left cervical ICA with residual lumen measuring 2 mm, consistent with 80% stenosis.   Mild atherosclerotic disease of right carotid bulb extending into the origin of right internal carotid artery discussed including but not limited to bruising, stroke, sah, death, retroperitoneal hematoma, femoral pseudoaneurysm, lower ext and renal as well as peripheral vasc compromise discussed - informed consent obtained at bedside from the patient. I discussed with the patient and family the risk/benefit of the procedure. Questions answered appropriately. She was loaded with aspirin Plavix. We will hold Eliquis for this evening and tomorrow morning. Continue dual antiplatelet therapy. Plan for left cervical ICA stent. PLAN:  --N.p.o. after midnight. --Continue dual antiplatelet therapy. --Statin therapy. --Left cervical ICA stent tomorrow a.m.       Kendall Naranjo MD  Stroke, [facility]  [address] [place]  Electronically signed 5/14/2021 at 6:35 PM

## 2021-05-14 NOTE — CARE COORDINATION
Called [facility] to inquire about referral.  Left voicemail for [facility] in admissions    [address] to inquire about referral left voicemail asking for a return call    [facility] left message asking for a return call. [address] from [facility] called to inform me that they have no beds available and do not anticipate any to late next week     1011 Received a call from Terrell Maier at [facility] they have checked in to her [employer] benefits and they are the same so [facility] has accepted patient. Patient updated. Patient to get Angio with possible sten on Saturday.

## 2021-05-14 NOTE — FLOWSHEET NOTE
Patient Health Questionnaire-9 (PHQ-9)    Over the last 2 weeks, how often have you been bothered by any of the following problems? 1. Little Interest or pleasure in doing things? [x] Not at all  [] Several Days  [] More than half the day  []  Nearly every day    2. Feeling down, depressed or hopeless? [] Not at all  [x] Several Days  [] More than half the day  []  Nearly every day    3. Trouble falling or staying asleep, or sleeping too much? [] Not at all  [x] Several Days  [] More than half the day  []  Nearly every day    4. Feeling tired or having little energy? [x] Not at all  [] Several Days  [] More than half the day  []  Nearly every day    5. Poor apettite or overeating? [x] Not at all  [] Several Days  [] More than half the day  []  Nearly every day    6. Feeling bad about yourself-or that you are a failure or have let yourself or your family down? [x] Not at all  [] Several Days  [] More than half the day  []  Nearly every day    7. Trouble concentrating on things, such as reading the newspaper or watching television? [x] Not at all  [] Several Days  [] More than half the day  []  Nearly every day    8. Moving or speaking so slowly that other people could have noticed? Or the opposite-being so fidgety or restless that you have been moving around a lot more than usual?   [x] Not at all  [] Several Days  [] More than half the day  []  Nearly every day    9. Thoughts that you would be better off dead or of hurting yourself in some way? [x] Not at all  [] Several Days  [] More than half the day  []  Nearly every day    Total Score: 2  If you checked off any problems, how difficult have these problems made it for you to do your work, take care of things at home, or get along with other people?    [x] Not difficult at all  [] Somewhat Difficult  [] Very Difficult  []  Extremely Difficult

## 2021-05-14 NOTE — PROGRESS NOTES
Physical Therapy  Facility/Department: [facility] NEURO  Daily Treatment Note  NAME: Marty Keane  : 1935  MRN: [id number]    Date of Service: 2021    Discharge Recommendations:  Patient would benefit from continued therapy after discharge   PT Equipment Recommendations  Equipment Needed: No    Assessment   Body structures, Functions, Activity limitations: Decreased balance;Decreased endurance  Assessment: Patient is receptive to education, highly motivated to return to independent lifestyle. Able to ambulate 200' with her rollator, gait deficits only presenting once she was getting fatigued. Patient needs further PT to regain full independence. PT Education: Goals;PT Role;Plan of Care;Transfer Training; Low Vision Education; Functional Mobility Training; Injury Prevention;Home Exercise Program;Gait Training;General Safety  REQUIRES PT FOLLOW UP: Yes  Activity Tolerance  Activity Tolerance: Patient Tolerated treatment well     Patient Diagnosis(es): The encounter diagnosis was Cerebrovascular accident (CVA), unspecified mechanism ([facility] [address] has no past medical history on file. has no past surgical history on file.     Restrictions  Restrictions/Precautions  Restrictions/Precautions: Fall Risk, Up as Tolerated  Required Braces or Orthoses?: No  Subjective   General  Chart Reviewed: Yes  Family / Caregiver Present: No  Pain Screening  Patient Currently in Pain: Denies  Vital Signs  Pulse: 63  Patient Currently in Pain: Denies       Orientation  Orientation  Overall Orientation Status: Within Normal Limits  Cognition   Cognition  Overall Cognitive Status: WFL  Objective      Transfers  Sit to Stand: Stand by assistance  Stand to sit: Stand by assistance  Comment: Needs cues on reaching for chair at sit to stand and stand to sit  Ambulation  Ambulation?: Yes  Ambulation 1  Surface: level tile  Device: Rolling Walker  Assistance: Contact guard assistance  Quality of Gait: Very minor foot drop once she was fatigued  Distance: 200'        Exercises  Comments: Balance exercises completed. Rhomberg stance without UE support. Standing feet apart but with narrow base of support, no UE support, closing eyes 5 and 10 second periods. Standing heel/toe raises x15 reps.  Standing hip flexion marches x15 reps                  Goals  Short term goals  Time Frame for Short term goals: 12 visits  Short term goal 1: Ambulate 100' independently w/ use of RW for home safety and maneuverability  Short term goal 2: Ambulate 48' independently w/o RW to demonstrate improved dynamic standing balance  Patient Goals   Patient goals : Go home    Plan    Plan  Times per week: 5-6 visits weekly  Times per day: Daily  Current Treatment Recommendations: Balance Training, Endurance Training, Gait Training, Strengthening, Safety Education & Training, Transfer Training, Patient/Caregiver Education & Training, Home Exercise Program, Functional Mobility Training, Neuromuscular Re-education  Safety Devices  Type of devices: Gait belt, All fall risk precautions in place, Left in chair, Call light within reach, Nurse notified  Restraints  Initially in place: No     Therapy Time   Individual Concurrent Group Co-treatment   Time In 1135         Time Out 1200         Minutes 25         Timed Code Treatment Minutes: 25 Minutes       Silas York, PT

## 2021-05-15 ENCOUNTER — APPOINTMENT (OUTPATIENT)
Dept: INTERVENTIONAL RADIOLOGY/VASCULAR | Age: 86
DRG: 034 | End: 2021-05-15
Payer: MEDICARE

## 2021-05-15 ENCOUNTER — ANESTHESIA (OUTPATIENT)
Dept: INTERVENTIONAL RADIOLOGY/VASCULAR | Age: 86
DRG: 034 | End: 2021-05-15
Payer: MEDICARE

## 2021-05-15 ENCOUNTER — ANESTHESIA EVENT (OUTPATIENT)
Dept: INTERVENTIONAL RADIOLOGY/VASCULAR | Age: 86
DRG: 034 | End: 2021-05-15
Payer: MEDICARE

## 2021-05-15 VITALS — OXYGEN SATURATION: 100 % | RESPIRATION RATE: 14 BRPM

## 2021-05-15 LAB
ABO/RH: NORMAL
ACTIVATED CLOTTING TIME: 148 SEC (ref 79–149)
ACTIVATED CLOTTING TIME: 217 SEC (ref 79–149)
ACTIVATED CLOTTING TIME: 266 SEC (ref 79–149)
ANION GAP SERPL CALCULATED.3IONS-SCNC: 9 MMOL/L (ref 9–17)
ANTIBODY SCREEN: NEGATIVE
ARM BAND NUMBER: NORMAL
BLOOD BANK SPECIMEN: NORMAL
BUN BLDV-MCNC: 14 MG/DL (ref 8–23)
BUN/CREAT BLD: NORMAL (ref 9–20)
CALCIUM SERPL-MCNC: 9 MG/DL (ref 8.6–10.4)
CHLORIDE BLD-SCNC: 100 MMOL/L (ref 98–107)
CO2: 26 MMOL/L (ref 20–31)
COLLAGEN ADENOSINE-5'-DIPHOSPHATE (ADP) TIME: 73 SEC (ref 67–112)
COLLAGEN EPINEPHRINE TIME: 132 SEC (ref 85–172)
CREAT SERPL-MCNC: 0.76 MG/DL (ref 0.5–0.9)
EXPIRATION DATE: NORMAL
GFR AFRICAN AMERICAN: >60 ML/MIN
GFR NON-AFRICAN AMERICAN: >60 ML/MIN
GFR SERPL CREATININE-BSD FRML MDRD: NORMAL ML/MIN/{1.73_M2}
GFR SERPL CREATININE-BSD FRML MDRD: NORMAL ML/MIN/{1.73_M2}
GLUCOSE BLD-MCNC: 92 MG/DL (ref 70–99)
PLATELET FUNCTION INTERP: NORMAL
POTASSIUM SERPL-SCNC: 4.2 MMOL/L (ref 3.7–5.3)
SODIUM BLD-SCNC: 135 MMOL/L (ref 135–144)

## 2021-05-15 PROCEDURE — 2500000003 HC RX 250 WO HCPCS: Performed by: NURSE ANESTHETIST, CERTIFIED REGISTERED

## 2021-05-15 PROCEDURE — 037L3DZ DILATION OF LEFT INTERNAL CAROTID ARTERY WITH INTRALUMINAL DEVICE, PERCUTANEOUS APPROACH: ICD-10-PCS | Performed by: PSYCHIATRY & NEUROLOGY

## 2021-05-15 PROCEDURE — 99232 SBSQ HOSP IP/OBS MODERATE 35: CPT | Performed by: PSYCHIATRY & NEUROLOGY

## 2021-05-15 PROCEDURE — 99233 SBSQ HOSP IP/OBS HIGH 50: CPT | Performed by: PSYCHIATRY & NEUROLOGY

## 2021-05-15 PROCEDURE — 6370000000 HC RX 637 (ALT 250 FOR IP): Performed by: STUDENT IN AN ORGANIZED HEALTH CARE EDUCATION/TRAINING PROGRAM

## 2021-05-15 PROCEDURE — G0269 OCCLUSIVE DEVICE IN VEIN ART: HCPCS | Performed by: PSYCHIATRY & NEUROLOGY

## 2021-05-15 PROCEDURE — 85347 COAGULATION TIME ACTIVATED: CPT

## 2021-05-15 PROCEDURE — 37215 TRANSCATH STENT CCA W/EPS: CPT | Performed by: PSYCHIATRY & NEUROLOGY

## 2021-05-15 PROCEDURE — X2AJ336 CEREBRAL EMBOLIC FILTRATION, EXTRACORPOREAL FLOW REVERSAL CIRCUIT FROM LEFT COMMON CAROTID ARTERY, PERCUTANEOUS APPROACH, NEW TECHNOLOGY GROUP 6: ICD-10-PCS | Performed by: PSYCHIATRY & NEUROLOGY

## 2021-05-15 PROCEDURE — 85576 BLOOD PLATELET AGGREGATION: CPT

## 2021-05-15 PROCEDURE — 86900 BLOOD TYPING SEROLOGIC ABO: CPT

## 2021-05-15 PROCEDURE — C1760 CLOSURE DEV, VASC: HCPCS

## 2021-05-15 PROCEDURE — 6360000002 HC RX W HCPCS: Performed by: ANESTHESIOLOGY

## 2021-05-15 PROCEDURE — 6360000002 HC RX W HCPCS: Performed by: NURSE ANESTHETIST, CERTIFIED REGISTERED

## 2021-05-15 PROCEDURE — 2000000003 HC NEURO ICU R&B

## 2021-05-15 PROCEDURE — C1876 STENT, NON-COA/NON-COV W/DEL: HCPCS

## 2021-05-15 PROCEDURE — 86901 BLOOD TYPING SEROLOGIC RH(D): CPT

## 2021-05-15 PROCEDURE — 3700000000 HC ANESTHESIA ATTENDED CARE

## 2021-05-15 PROCEDURE — 99223 1ST HOSP IP/OBS HIGH 75: CPT | Performed by: PSYCHIATRY & NEUROLOGY

## 2021-05-15 PROCEDURE — 2580000003 HC RX 258: Performed by: NURSE ANESTHETIST, CERTIFIED REGISTERED

## 2021-05-15 PROCEDURE — 2580000003 HC RX 258: Performed by: ANESTHESIOLOGY

## 2021-05-15 PROCEDURE — 3700000001 HC ADD 15 MINUTES (ANESTHESIA)

## 2021-05-15 PROCEDURE — C1769 GUIDE WIRE: HCPCS

## 2021-05-15 PROCEDURE — 36415 COLL VENOUS BLD VENIPUNCTURE: CPT

## 2021-05-15 PROCEDURE — C1894 INTRO/SHEATH, NON-LASER: HCPCS

## 2021-05-15 PROCEDURE — 6360000004 HC RX CONTRAST MEDICATION: Performed by: PSYCHIATRY & NEUROLOGY

## 2021-05-15 PROCEDURE — C1884 EMBOLIZATION PROTECT SYST: HCPCS

## 2021-05-15 PROCEDURE — C1725 CATH, TRANSLUMIN NON-LASER: HCPCS

## 2021-05-15 PROCEDURE — 80048 BASIC METABOLIC PNL TOTAL CA: CPT

## 2021-05-15 PROCEDURE — B3181ZZ FLUOROSCOPY OF BILATERAL INTERNAL CAROTID ARTERIES USING LOW OSMOLAR CONTRAST: ICD-10-PCS | Performed by: PSYCHIATRY & NEUROLOGY

## 2021-05-15 PROCEDURE — 87086 URINE CULTURE/COLONY COUNT: CPT

## 2021-05-15 PROCEDURE — 2709999900 HC NON-CHARGEABLE SUPPLY

## 2021-05-15 PROCEDURE — 86850 RBC ANTIBODY SCREEN: CPT

## 2021-05-15 RX ORDER — HEPARIN SODIUM 1000 [USP'U]/ML
INJECTION, SOLUTION INTRAVENOUS; SUBCUTANEOUS PRN
Status: DISCONTINUED | OUTPATIENT
Start: 2021-05-15 | End: 2021-05-15 | Stop reason: SDUPTHER

## 2021-05-15 RX ORDER — PROMETHAZINE HYDROCHLORIDE 12.5 MG/1
12.5 TABLET ORAL EVERY 6 HOURS PRN
Status: DISCONTINUED | OUTPATIENT
Start: 2021-05-15 | End: 2021-05-16

## 2021-05-15 RX ORDER — SODIUM CHLORIDE 0.9 % (FLUSH) 0.9 %
5-40 SYRINGE (ML) INJECTION PRN
Status: DISCONTINUED | OUTPATIENT
Start: 2021-05-15 | End: 2021-05-18 | Stop reason: HOSPADM

## 2021-05-15 RX ORDER — CEFAZOLIN SODIUM 1 G/3ML
INJECTION, POWDER, FOR SOLUTION INTRAMUSCULAR; INTRAVENOUS PRN
Status: DISCONTINUED | OUTPATIENT
Start: 2021-05-15 | End: 2021-05-15 | Stop reason: SDUPTHER

## 2021-05-15 RX ORDER — IODIXANOL 270 MG/ML
45 INJECTION, SOLUTION INTRAVASCULAR
Status: COMPLETED | OUTPATIENT
Start: 2021-05-15 | End: 2021-05-15

## 2021-05-15 RX ORDER — ACETAMINOPHEN 325 MG/1
650 TABLET ORAL EVERY 4 HOURS PRN
Status: DISCONTINUED | OUTPATIENT
Start: 2021-05-15 | End: 2021-05-18 | Stop reason: HOSPADM

## 2021-05-15 RX ORDER — GLYCOPYRROLATE 1 MG/5 ML
SYRINGE (ML) INTRAVENOUS PRN
Status: DISCONTINUED | OUTPATIENT
Start: 2021-05-15 | End: 2021-05-15 | Stop reason: SDUPTHER

## 2021-05-15 RX ORDER — SODIUM CHLORIDE 9 MG/ML
25 INJECTION, SOLUTION INTRAVENOUS PRN
Status: DISCONTINUED | OUTPATIENT
Start: 2021-05-15 | End: 2021-05-18 | Stop reason: HOSPADM

## 2021-05-15 RX ORDER — LIDOCAINE HYDROCHLORIDE 10 MG/ML
1 INJECTION, SOLUTION EPIDURAL; INFILTRATION; INTRACAUDAL; PERINEURAL
Status: DISPENSED | OUTPATIENT
Start: 2021-05-15 | End: 2021-05-15

## 2021-05-15 RX ORDER — GLYCOPYRROLATE 1 MG/5 ML
SYRINGE (ML) INTRAVENOUS PRN
Status: DISCONTINUED | OUTPATIENT
Start: 2021-05-15 | End: 2021-05-15

## 2021-05-15 RX ORDER — FENTANYL CITRATE 50 UG/ML
25 INJECTION, SOLUTION INTRAMUSCULAR; INTRAVENOUS EVERY 5 MIN PRN
Status: DISCONTINUED | OUTPATIENT
Start: 2021-05-15 | End: 2021-05-16

## 2021-05-15 RX ORDER — HYDRALAZINE HYDROCHLORIDE 20 MG/ML
5 INJECTION INTRAMUSCULAR; INTRAVENOUS EVERY 4 HOURS PRN
Status: DISCONTINUED | OUTPATIENT
Start: 2021-05-15 | End: 2021-05-18 | Stop reason: HOSPADM

## 2021-05-15 RX ORDER — FENTANYL CITRATE 50 UG/ML
INJECTION, SOLUTION INTRAMUSCULAR; INTRAVENOUS PRN
Status: DISCONTINUED | OUTPATIENT
Start: 2021-05-15 | End: 2021-05-15 | Stop reason: SDUPTHER

## 2021-05-15 RX ORDER — SODIUM CHLORIDE 0.9 % (FLUSH) 0.9 %
5-40 SYRINGE (ML) INJECTION EVERY 12 HOURS SCHEDULED
Status: DISCONTINUED | OUTPATIENT
Start: 2021-05-15 | End: 2021-05-18 | Stop reason: HOSPADM

## 2021-05-15 RX ORDER — LABETALOL HYDROCHLORIDE 5 MG/ML
10 INJECTION, SOLUTION INTRAVENOUS
Status: DISCONTINUED | OUTPATIENT
Start: 2021-05-15 | End: 2021-05-18 | Stop reason: HOSPADM

## 2021-05-15 RX ORDER — SODIUM CHLORIDE, SODIUM LACTATE, POTASSIUM CHLORIDE, CALCIUM CHLORIDE 600; 310; 30; 20 MG/100ML; MG/100ML; MG/100ML; MG/100ML
INJECTION, SOLUTION INTRAVENOUS CONTINUOUS
Status: DISCONTINUED | OUTPATIENT
Start: 2021-05-15 | End: 2021-05-15

## 2021-05-15 RX ORDER — MIDAZOLAM HYDROCHLORIDE 2 MG/2ML
1 INJECTION, SOLUTION INTRAMUSCULAR; INTRAVENOUS EVERY 10 MIN PRN
Status: DISCONTINUED | OUTPATIENT
Start: 2021-05-15 | End: 2021-05-16

## 2021-05-15 RX ORDER — SODIUM CHLORIDE, SODIUM LACTATE, POTASSIUM CHLORIDE, CALCIUM CHLORIDE 600; 310; 30; 20 MG/100ML; MG/100ML; MG/100ML; MG/100ML
INJECTION, SOLUTION INTRAVENOUS CONTINUOUS PRN
Status: DISCONTINUED | OUTPATIENT
Start: 2021-05-15 | End: 2021-05-15 | Stop reason: SDUPTHER

## 2021-05-15 RX ORDER — ONDANSETRON 2 MG/ML
4 INJECTION INTRAMUSCULAR; INTRAVENOUS EVERY 6 HOURS PRN
Status: DISCONTINUED | OUTPATIENT
Start: 2021-05-15 | End: 2021-05-16

## 2021-05-15 RX ADMIN — FENTANYL CITRATE 25 MCG: 50 INJECTION, SOLUTION INTRAMUSCULAR; INTRAVENOUS at 11:02

## 2021-05-15 RX ADMIN — ACETAMINOPHEN 650 MG: 325 TABLET ORAL at 13:01

## 2021-05-15 RX ADMIN — CLOPIDOGREL 75 MG: 75 TABLET, FILM COATED ORAL at 06:10

## 2021-05-15 RX ADMIN — SODIUM CHLORIDE, POTASSIUM CHLORIDE, SODIUM LACTATE AND CALCIUM CHLORIDE: 600; 310; 30; 20 INJECTION, SOLUTION INTRAVENOUS at 11:07

## 2021-05-15 RX ADMIN — Medication 0.4 MG: at 09:37

## 2021-05-15 RX ADMIN — SODIUM CHLORIDE, PRESERVATIVE FREE 5 ML: 5 INJECTION INTRAVENOUS at 20:58

## 2021-05-15 RX ADMIN — HEPARIN SODIUM 3850 UNITS: 1000 INJECTION, SOLUTION INTRAVENOUS; SUBCUTANEOUS at 08:40

## 2021-05-15 RX ADMIN — FENTANYL CITRATE 25 MCG: 50 INJECTION, SOLUTION INTRAMUSCULAR; INTRAVENOUS at 08:23

## 2021-05-15 RX ADMIN — ROSUVASTATIN CALCIUM 20 MG: 20 TABLET, FILM COATED ORAL at 21:28

## 2021-05-15 RX ADMIN — Medication 81 MG: at 06:08

## 2021-05-15 RX ADMIN — IODIXANOL 45 ML: 270 INJECTION, SOLUTION INTRAVASCULAR at 10:06

## 2021-05-15 RX ADMIN — CEFAZOLIN 2000 MG: 1 INJECTION, POWDER, FOR SOLUTION INTRAMUSCULAR; INTRAVENOUS at 08:22

## 2021-05-15 RX ADMIN — DOCUSATE SODIUM 100 MG: 100 CAPSULE ORAL at 21:28

## 2021-05-15 RX ADMIN — FENTANYL CITRATE 50 MCG: 50 INJECTION, SOLUTION INTRAMUSCULAR; INTRAVENOUS at 08:08

## 2021-05-15 RX ADMIN — MAGNESIUM GLUCONATE 500 MG ORAL TABLET 400 MG: 500 TABLET ORAL at 21:28

## 2021-05-15 RX ADMIN — HEPARIN SODIUM 2000 UNITS: 1000 INJECTION, SOLUTION INTRAVENOUS; SUBCUTANEOUS at 09:19

## 2021-05-15 RX ADMIN — SODIUM CHLORIDE, POTASSIUM CHLORIDE, SODIUM LACTATE AND CALCIUM CHLORIDE: 600; 310; 30; 20 INJECTION, SOLUTION INTRAVENOUS at 08:02

## 2021-05-15 ASSESSMENT — PULMONARY FUNCTION TESTS
PIF_VALUE: 0
PIF_VALUE: 0
PIF_VALUE: 1
PIF_VALUE: 0
PIF_VALUE: 1
PIF_VALUE: 0
PIF_VALUE: 1
PIF_VALUE: 0
PIF_VALUE: 1
PIF_VALUE: 1
PIF_VALUE: 0
PIF_VALUE: 0
PIF_VALUE: 1
PIF_VALUE: 1
PIF_VALUE: 0
PIF_VALUE: 1
PIF_VALUE: 1
PIF_VALUE: 0
PIF_VALUE: 1
PIF_VALUE: 0
PIF_VALUE: 1
PIF_VALUE: 1
PIF_VALUE: 0

## 2021-05-15 ASSESSMENT — PAIN DESCRIPTION - DESCRIPTORS
DESCRIPTORS: ACHING
DESCRIPTORS: ACHING

## 2021-05-15 ASSESSMENT — PAIN SCALES - GENERAL
PAINLEVEL_OUTOF10: 7
PAINLEVEL_OUTOF10: 6
PAINLEVEL_OUTOF10: 0
PAINLEVEL_OUTOF10: 0
PAINLEVEL_OUTOF10: 2
PAINLEVEL_OUTOF10: 3

## 2021-05-15 ASSESSMENT — PAIN DESCRIPTION - PAIN TYPE: TYPE: ACUTE PAIN

## 2021-05-15 ASSESSMENT — PAIN DESCRIPTION - FREQUENCY
FREQUENCY: INTERMITTENT
FREQUENCY: INTERMITTENT

## 2021-05-15 ASSESSMENT — PAIN DESCRIPTION - ORIENTATION: ORIENTATION: RIGHT

## 2021-05-15 ASSESSMENT — PAIN DESCRIPTION - LOCATION: LOCATION: GROIN

## 2021-05-15 NOTE — SEDATION DOCUMENTATION
Pre procedure assessment:  Pt awake/ alert/ oriented, denies discomfort, denies disabilities at this time. Resp even/ non labored. Skin pink, warm, dry, cap refill < 3 sec. Voice clear and appropriate. Pupils PERRLA @ 3. Face symmetric. Pt moves all extremities. Good pulse, motor, sensory x 4. Pedal pulses palpated and marked.

## 2021-05-15 NOTE — SEDATION DOCUMENTATION
Closing Time:  All instrumentation removed. Manual pressure held at puncture site.   No obvious complications noted    Angio Seal closure device deployed

## 2021-05-15 NOTE — PROGRESS NOTES
Physical Therapy    DATE: 5/15/2021    NAME: Shanita Molina  MRN: [id number]   : 1935      Patient not seen this date for Physical Therapy due to:    Surgery/Procedure: L ICA stent placement, pt returned to NICU room 526      Electronically signed by Mile Domínguez PTA on 5/15/2021 at 4:49 PM

## 2021-05-15 NOTE — PLAN OF CARE
Problem: HEMODYNAMIC STATUS  Goal: Patient has stable vital signs and fluid balance  Outcome: Met This Shift   Neuro assessment completed, fall precautions in place, aspirations precautions in place, assess for barriers in communication and mobility, interventions to assist in communication and mobility in place, encouraged to call for assistance, adaptive devices used as needed, assess emotional state and support offered, encouraged patient to communicate by available means, and support systems included in patient care. Problem: Falls - Risk of:  Goal: Will remain free from falls  Description: Will remain free from falls  Outcome: Met This Shift     Pt assessed as a fall risk this shift. Remains free from falls and accidental injury at this time. Fall precautions in place, including falling star sign. Floor free from obstacles, and bed is locked and in lowest position. Adequate lighting provided. Pt encouraged to call before getting Out Of Bed for any need. Will continue to monitor needs during hourly rounding, and reinforce education on use of call light.

## 2021-05-15 NOTE — PROGRESS NOTES
magnesium oxide  400 mg Oral BID    pantoprazole  40 mg Oral QAM AC    sodium chloride flush  5-40 mL Intravenous 2 times per day    aspirin  81 mg Oral Daily    Or    aspirin  300 mg Rectal Daily     Continuous Infusions:   lactated ringers      sodium chloride      sodium chloride      sodium chloride 75 mL/hr at 05/12/21 2228     CBC:   Recent Labs     05/12/21 1947 05/13/21  0614   WBC 8.5 6.4   HGB 12.7 11.9    224     BMP:    Recent Labs     05/12/21 1947 05/13/21  0614 05/15/21  0333   * 133* 135   K 4.2 4.1 4.2   CL 96* 102 100   CO2 24 24 26   BUN 11 11 14   CREATININE 0.77 0.81 0.76   GLUCOSE 97 84 92     Hepatic: No results for input(s): AST, ALT, ALB, BILITOT, ALKPHOS in the last 72 hours. Troponin: No results for input(s): TROPONINI in the last 72 hours. BNP: No results for input(s): BNP in the last 72 hours. Lipids:   Recent Labs     05/12/21  2234   CHOL 123   HDL 61     INR:   Recent Labs     05/12/21 1947   INR 1.0     Cta Head Neck W Contrast     Result Date: 5/12/2021  EXAMINATION: CTA OF THE HEAD AND NECK WITH CONTRAST 5/12/2021 8:36 pm: TECHNIQUE: CTA of the head and neck was performed with the administration of intravenous contrast. Multiplanar reformatted images are provided for review. MIP images are provided for review. Stenosis of the internal carotid arteries measured using NASCET criteria. Dose modulation, iterative reconstruction, and/or weight based adjustment of the mA/kV was utilized to reduce the radiation dose to as low as reasonably achievable. COMPARISON: None. HISTORY: ORDERING SYSTEM PROVIDED HISTORY: L temporal stroke. TECHNOLOGIST PROVIDED HISTORY: L temporal stroke.  Decision Support Exception - unselect if not a suspected or confirmed emergency medical condition->Emergency Medical Condition (MA) Reason for Exam: stroke consult,lt infarct seen on mri Acuity: Unknown Type of Exam: Unknown FINDINGS: CTA NECK: AORTIC ARCH/ARCH VESSELS: Left vertebral The Jyoti Symptomatic Carotid Endarterectomy Trial (NASCET) is a method of quantifying internal carotid artery stenosis. Distal internal carotid artery diameter as the denominator for stenosis measurement: MILD = <50% stenosis. MODERATE =  50-69% stenosis. SEVERE =  70-89% stenosis. HAIRLINE/CRITICAL = 90-99% stenosis. OCCLUDED = 100% stenosis. ==================================      Mri Brain Wo Contrast     Result Date: 5/12/2021  EXAMINATION: MRI OF THE BRAIN WITHOUT CONTRAST  5/12/2021 2:02 pm TECHNIQUE: Multiplanar multisequence MRI of the brain was performed without the administration of intravenous contrast. COMPARISON: None. HISTORY: ORDERING SYSTEM PROVIDED HISTORY: 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 TECHNOLOGIST PROVIDED HISTORY: Reason for Exam: patient states dizziness and nausea x1 month FINDINGS: INTRACRANIAL STRUCTURES/VENTRICLES: There is a 5 mm focus of restricted diffusion within the cortex of the superior left temporal lobe consistent with an acute/subacute infarct. There is corresponding abnormal increased T2/FLAIR signal intensity. There is no acute intracranial hemorrhage. No mass effect or midline shift is present. There are multiple foci of abnormal increased T2/FLAIR signal intensity within the periventricular and deep white matter of both hemispheres. The brain is otherwise of normal signal intensities. There is no sellar or suprasellar mass present. There is no ventriculomegaly or abnormal extra-axial fluid collection present. The proximal portions of the [place] demonstrate normal flow voids. ORBITS: Limited evaluation of the orbits is unremarkable. SINUSES: There is mild mucosal thickening in the maxillary sinuses. The paranasal sinuses and mastoid air cells are otherwise clear. No fluid levels are identified. BONES/SOFT TISSUES: Bone marrow signal intensity is normal.      1. Acute/subacute 5 mm cortical infarct within the superior left temporal lobe.  2. No acute intracranial hemorrhage. 3. Mild chronic small vessel ischemic changes. The findings were sent to the Radiology Results [id number] at 3:24 pm on 5/12/2021to be communicated to a licensed caregiver. Assessment and Recommendations: This is a 82-year-old female with past medical history including atrial fibrillation on Eliquis, hyperlipidemia, hypertension, hypothyroidism. Patient presented with MRI brain revealing left MCA ischemic stroke. CTA head and neck with severe left cervical ICA stenosis. --Symptomatic left cervical ICA severe stenosis. Cerebral angiogram with IV moderate sedation. Risks and benefits discussed including but not limited to bruising, stroke, sah, death, retroperitoneal hematoma, femoral pseudoaneurysm, lower ext and renal as well as peripheral vasc compromise discussed - informed consent obtained at bedside from the patient. She was loaded with aspirin Plavix. We will hold Eliquis for this evening and tomorrow morning. Continue dual antiplatelet therapy. Plan for left cervical ICA stent. No new acute events over the night. Neuro exam stable. PLAN:  --Keep patient n.p.o.  --Continue dual antiplatelet therapy. --Statin therapy. --Proceed for left cervical ICA stent.       Estefania Leiva MD  Stroke, [facility]  [address]  Electronically signed 5/15/2021 at 8:08 AM

## 2021-05-15 NOTE — ANESTHESIA POSTPROCEDURE EVALUATION
Department of Anesthesiology  Postprocedure Note    Patient: Taylor Henry  MRN: [id number]  [place]: 1935  Date of evaluation: 5/15/2021  Time:  10:29 AM     Procedure Summary     Date: 05/15/21 Room / Location: [facility]    Anesthesia Start: 0802 Anesthesia Stop:     Procedure: IR ANGIOGRAM CAROTID CEREBRAL BILATERAL Diagnosis: (diagnositc cerebral angio with carotid stent placement)    Scheduled Providers:  Responsible Provider: Janeen Singh MD    Anesthesia Type: MAC ASA Status: 3          Anesthesia Type: No value filed. Madeline Phase I:      Madeline Phase II:      Last vitals: Reviewed and per EMR flowsheets.        Anesthesia Post Evaluation    Patient location during evaluation: ICU  Patient participation: complete - patient participated  Level of consciousness: awake and alert  Pain score: 0  Airway patency: patent  Nausea & Vomiting: no vomiting and no nausea  Complications: no  Cardiovascular status: hemodynamically stable  Respiratory status: acceptable  Hydration status: stable

## 2021-05-15 NOTE — ANESTHESIA PROCEDURE NOTES
Arterial Line:    An arterial line was placed using ultrasound guidance and surface landmarks, in the OR for the following indication(s): continuous blood pressure monitoring and blood sampling needed. A 20 gauge (size), (length), Arrow (type) catheter was placed, Seldinger technique not used, into the left radial artery, secured by Tegaderm and tape. Anesthesia type: General    Events:  patient tolerated procedure well with no complications.   Anesthesiologist: Jeff Troncoso MD  Preanesthetic Checklist  Completed: patient identified, IV checked, site marked, risks and benefits discussed, surgical consent, monitors and equipment checked, pre-op evaluation, timeout performed, anesthesia consent given, oxygen available and patient being monitored

## 2021-05-15 NOTE — ANESTHESIA PRE PROCEDURE
Department of Anesthesiology  Preprocedure Note       Name:  Joselito Dickey   Age:  82 y.o.  :  1935                                          MRN:  [id number]         Date:  5/15/2021      Surgeon: * Surgery not found *    Procedure:     Medications prior to admission:   Prior to Admission medications    Medication Sig Start Date End Date Taking? Authorizing Provider   pantoprazole (PROTONIX) 40 MG tablet take 1 tablet by mouth once daily 21   Caridad Nova DO   Cholecalciferol (VITAMIN D3 PO) Take by mouth daily    Historical Provider, MD   Cyanocobalamin (VITAMIN B-12 PO) Take by mouth daily    Historical Provider, MD   simvastatin (ZOCOR) 10 MG tablet take 1 tablet by mouth every evening 21   Caridad Nova DO   levothyroxine (SYNTHROID) 75 MCG tablet take 1 tablet by mouth once daily 3/22/21   Caridad Nova DO   oxybutynin (DITROPAN XL) 10 MG extended release tablet Take 1 tablet by mouth daily  Patient not taking: Reported on 2021   Caridad Nova DO   hydrocortisone (PROCTO-MED HC) 2.5 % CREA rectal cream apply 1 applicatorful rectally four times a day if needed for HEMORRHOIDS 21   Caridad Nova DO   ELIQUIS 2.5 MG TABS tablet take 1 tablet by mouth twice a day 21   Caridad Nova DO   apixaban (ELIQUIS) 2.5 MG TABS tablet Take 1 tablet by mouth 2 times daily 21   Caridad Nova DO   docusate sodium (COLACE) 100 MG capsule Take 100 mg by mouth 2 times daily    Historical Provider, MD   promethazine (PHENERGAN) 25 MG tablet take 1 tablet by mouth three times a day 30 MINUTES PRIOR TO MEALS for 10 days 20   Historical Provider, MD   nystatin (MYCOSTATIN) 127732 UNIT/GM cream Apply topically 2 times daily.  20   Caridad Nova DO   lipase-protease-amylase (CREON) 32258 units delayed release capsule Take 1 capsule by mouth 3 times daily (with meals) 20   Caridad Nova DO   Magnesium Oxide 250 MG TABS Take 400 mg by mouth 2 times daily     Historical Kobe Skinner MD        0.9 % sodium chloride infusion   Intravenous Continuous Samer Kobe Skinner MD 75 mL/hr at 05/12/21 2228 New Bag at 05/12/21 2228    hydrALAZINE (APRESOLINE) injection 5 mg  5 mg Intravenous Q6H PRN Samer Kobe Skinner MD           Allergies: Allergies   Allergen Reactions    Percocet [Oxycodone-Acetaminophen]       Woozy    Hydrocodone        Problem List:    Patient Active Problem List   Diagnosis Code    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 U07.1, G93.49    Stroke determined by clinical assessment ([facility] [address] I63.9    Acute ischemic stroke ([facility] [address] I63.9    Cognitive impairment R41.89    Cerebrovascular accident (CVA) ([facility] [address] I63.9       Past Medical History:  History reviewed. No pertinent past medical history. Past Surgical History:  History reviewed. No pertinent surgical history. Social History:    Social History     Tobacco Use    Smoking status: Never Smoker    Smokeless tobacco: Never Used   Substance Use Topics    Alcohol use: Yes     Comment: rare                                Counseling given: Not Answered      Vital Signs (Current):   Vitals:    05/14/21 1612 05/14/21 2015 05/14/21 2348 05/15/21 0439   BP: (!) 140/72 (!) 158/77 (!) 149/69 121/68   Pulse: 63 54 63 52   Resp: 17  16 16   Temp: 97.4 °F (36.3 °C) 97.3 °F (36.3 °C) 98.1 °F (36.7 °C) 97.3 °F (36.3 °C)   TempSrc: Oral Oral Oral Oral   SpO2: 96%      Weight:       Height:                                                  BP Readings from Last 3 Encounters:   05/15/21 121/68   04/27/21 123/60   04/14/21 136/65       NPO Status: Time of last liquid consumption: 2200                        Time of last solid consumption: 2100                                                      BMI:   Wt Readings from Last 3 Encounters:   05/12/21 121 lb 4.1 oz (55 kg)   04/27/21 121 lb (54.9 kg)   04/14/21 128 lb 3.2 oz (58.2 kg)     Body mass index is 20.18 kg/m².     CBC:   Lab Results   Component Value Date    WBC 6.4 05/13/2021    RBC 3.73 05/13/2021    RBC 4.65 04/30/2021    HGB 11.9 05/13/2021    HCT 36.6 05/13/2021    MCV 98.1 05/13/2021    RDW 14.0 05/13/2021     05/13/2021       CMP:   Lab Results   Component Value Date     05/15/2021    K 4.2 05/15/2021     05/15/2021    CO2 26 05/15/2021    BUN 14 05/15/2021    CREATININE 0.76 05/15/2021    GFRAA >60 05/15/2021    LABGLOM >60 05/15/2021    GLUCOSE 92 05/15/2021    GLUCOSE 81 04/30/2021    PROT 7.8 04/30/2021    CALCIUM 9.0 05/15/2021    BILITOT 0.8 04/30/2021    ALKPHOS 67 04/30/2021    AST 17 04/30/2021    ALT 11 04/30/2021       POC Tests: No results for input(s): POCGLU, POCNA, POCK, POCCL, POCBUN, POCHEMO, POCHCT in the last 72 hours. Coags:   Lab Results   Component Value Date    PROTIME 10.3 05/12/2021    INR 1.0 05/12/2021    APTT 26.5 05/12/2021       HCG (If Applicable): No results found for: PREGTESTUR, PREGSERUM, HCG, HCGQUANT     ABGs: No results found for: PHART, PO2ART, WFV5HBA, LCN2YHO, BEART, H6OFHMTX     Type & Screen (If Applicable):  No results found for: LABABO, LABRH    Drug/Infectious Status (If Applicable):  No results found for: HIV, HEPCAB    COVID-19 Screening (If Applicable):   Lab Results   Component Value Date    COVID19 Not Detected 05/14/2021           Anesthesia Evaluation  Patient summary reviewed no history of anesthetic complications:   Airway: Mallampati: II  TM distance: >3 FB   Neck ROM: full  Mouth opening: > = 3 FB Dental:    (+) upper dentures      Pulmonary:Negative Pulmonary ROS and normal exam                               Cardiovascular:    (+) hypertension: no interval change,         Rhythm: regular  Rate: normal                    Neuro/Psych:   (+) CVA: residual symptoms,             GI/Hepatic/Renal: Neg GI/Hepatic/Renal ROS            Endo/Other: Negative Endo/Other ROS                    Abdominal:           Vascular: negative vascular ROS.                                      Anesthesia Plan      MAC     ASA 3       Induction: intravenous. arterial line    Anesthetic plan and risks discussed with patient. Use of blood products discussed with patient whom consented to blood products. Plan discussed with CRNA.                   Noah Zepeda MD   5/15/2021

## 2021-05-15 NOTE — OP NOTE
[facility]    NEUROENDOVASCULAR SERVICE: POST-OP NOTE: 5/15/2021    Pt Name: Ángel Vasquez  MRN: [id number]  [place]: 1935  Date of Procedure: 5/15/2021  Primary Care Physician: Sandrita Collins DO    Pre-Procedural Diagnosis: Left MCA ischemic stroke due to symptomatic left severe cervical ICA stenosis. Post-Procedural Diagnosis: S/p successful left cervical ICA stent    Procedure Performed:cerebral angiogram with cervical carotid stenting and balloon angioplasty with distal embolic protection device left MCA ischemic stroke. Surgeon:   Fatmata Castano MD    Fellow:  Siena Parra MD     1st Assistant:  Dawit Escamilla    PRE-PROCEDURAL EXAM:  Prestroke baseline mRS MODIFIED VERNON SCORE: 2 - Slight disability:  unable to carry out all previous activities, but able to look after own affairs without assistance. Neurological exam performed and unchanged from initial H&P or consult    Anesthesia: MAC anesthesia  Complications: none    Intra-Operative EXAM:  Neurological exam performed and unchanged from initial H&P or consult    EBL: < Minimal      Cc            Specimens: Were not Obtained  Contrast:     Visipaque 270 low osmolar 45 cc             Fluoro: 38.9 min    Findings:  Please see dictated Radiology note for further details   1. Left cervical ICA severe stenosis measuring approximately 75% per NASCET criteria with underlying ulcerative plaque/atherosclerotic disease. 2. This was successfully treated using 8 x 40 mm Acculink stent followed by balloon angioplasty using 5 x 20 mm stenting balloon inflated up to 8 PRERNA. 3. The above-mentioned treatment resulted in significant improvement of the underlying stenosis with less than 10% residual not hemodynamically significant.     POST-PROCEDURAL EXAM :   Stable neurological Exam  Neurological exam performed and unchanged from initial H&P or consult    Closure:  right Angioseal 6   F    POST-PROCEDURAL MONITORING : see orders  Disposition: Neuro ICU      Recommendations:  Back to NSICU. Do not bend right leg for 3 hours. Groin checks per protocol. Neuro checks per icu protocol. Peripheral pulse checks per protocol. ICU management per NSICU team.   SBP goal   Resume aspirin and plavix starting tomorrow for 2 weeks, then asa 81 and eliquis thereafter.   Upon discharge follow up with Dr. Abiel Rodriguez in 2 weeks and Dr. Heaven Quevedo in 3 months with CTA neck with contrast.    MD Quyen Guillermo MD   Pager: [phone]  Stroke, [facility]  [address] [place]  Electronically signed 5/15/2021 at 10:14 AM

## 2021-05-15 NOTE — H&P
Neuro ICU History & Physical    Patient Name: Nesha Walton  Patient : 1935  Room/Bed: 144821[facility]  Allergies: Allergies   Allergen Reactions    Percocet [Oxycodone-Acetaminophen]       Woozy    Hydrocodone      Problem List:   Patient Active Problem List   Diagnosis    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    Stroke determined by clinical assessment ([facility]    Acute ischemic stroke ([facility]    Cognitive impairment    Cerebrovascular accident (CVA) ([facility]       CHIEF COMPLAINT     Syncope, memory loss    HPI    History Obtained From: patient, emr    The patient is a 82 y.o. female  past medical history including atrial fibrillation on Eliquis, hyperlipidemia, hypertension, hypothyroidism, admitted on 2021 with complaint of short-term memory difficulties, since she got COVID-19 infection on , she has had a syncopal event along with memory complaint to be admitted to [facility] for 5 days, remote complaints have persisted, patient placed on Aricept to tolerate having nightmares     -MRI 2021: Acute/subacute 5 mm cortical infarction within the superior left temporal lobe,  -CTA head and neck 2021: Severe 80% stenosis at the origin of left cervical ICA, mild stenosis at the origin of left vertebral artery which statically arises from the arch  -EEG 2021: Within normal limits    Patient was loaded with aspirin, plavix and eliquis held for dsa today with Acculink stent placement in left ICA. Admitted to ICU From: angio suite   Reason for ICU Admission: s/p carotid stent. PATIENT HISTORY   Past Medical History:        Diagnosis Date    Acid reflux     Atrial fibrillation ([facility]     CARDIOLOGIST -   [address] [place] [address]    Hyperlipidemia     Thyroid disease     TIA (transient ischemic attack)     SPENT 3 WKS IN REHAB    Wears dentures     UPPER       Past Surgical History:        Procedure Laterality Date    BLADDER SURGERY      suspension    HERNIA REPAIR  2012    x2    HYSTERECTOMY         Social History:   Social History     Socioeconomic History    Marital status:      Spouse name: Not on file    Number of children: Not on file    Years of education: Not on file    Highest education level: Not on file   Occupational History    Not on file   Tobacco Use    Smoking status: Never Smoker    Smokeless tobacco: Never Used   Vaping Use    Vaping Use: Never used   Substance and Sexual Activity    Alcohol use: Yes     Comment: rare    Drug use: Never    Sexual activity: Not Currently   Other Topics Concern    Not on file   Social History Narrative    Not on file     Social Determinants of Health     Financial Resource Strain: Low Risk     Difficulty of Paying Living Expenses: Not hard at all   Food Insecurity: No Food Insecurity    Worried About [address] in the Last Year: Never true    [address] in the Last Year: Never true   Transportation Needs:     Lack of Transportation (Medical):  Lack of Transportation (Non-Medical):    Physical Activity:     Days of Exercise per Week:     Minutes of Exercise per Session:    Stress:     Feeling of Stress :    Social Connections:     Frequency of Communication with Friends and Family:     Frequency of Social Gatherings with Friends and Family:     Attends [employer] Services:     Active Member of Clubs or Organizations:     Attends Club or Organization Meetings:     Marital Status:    Intimate Partner Violence:     Fear of Current or Ex-Partner:     Emotionally Abused:     Physically Abused:     Sexually Abused:        Family History:   History reviewed. No pertinent family history.     Allergies:    Percocet [oxycodone-acetaminophen] and Hydrocodone    Medications Prior to Admission:    Medications Prior to Admission: pantoprazole (PROTONIX) 40 MG tablet, take 1 tablet by mouth once daily  Cholecalciferol (VITAMIN D3 PO), Take by mouth daily  Cyanocobalamin (VITAMIN B-12 PO), Take by mouth daily  simvastatin (ZOCOR) 10 MG tablet, take 1 tablet by mouth every evening  levothyroxine (SYNTHROID) 75 MCG tablet, take 1 tablet by mouth once daily  oxybutynin (DITROPAN XL) 10 MG extended release tablet, Take 1 tablet by mouth daily (Patient not taking: Reported on 4/27/2021)  hydrocortisone (PROCTO-MED HC) 2.5 % CREA rectal cream, apply 1 applicatorful rectally four times a day if needed for HEMORRHOIDS  ELIQUIS 2.5 MG TABS tablet, take 1 tablet by mouth twice a day  apixaban (ELIQUIS) 2.5 MG TABS tablet, Take 1 tablet by mouth 2 times daily  docusate sodium (COLACE) 100 MG capsule, Take 100 mg by mouth 2 times daily  promethazine (PHENERGAN) 25 MG tablet, take 1 tablet by mouth three times a day 30 MINUTES PRIOR TO MEALS for 10 days  nystatin (MYCOSTATIN) 535145 UNIT/GM cream, Apply topically 2 times daily.   lipase-protease-amylase (CREON) 83313 units delayed release capsule, Take 1 capsule by mouth 3 times daily (with meals)  Magnesium Oxide 250 MG TABS, Take 400 mg by mouth 2 times daily     Current Medications:  Current Facility-Administered Medications: fentaNYL (SUBLIMAZE) injection 25 mcg, 25 mcg, Intravenous, Q5 Min PRN  lactated ringers infusion, , Intravenous, Continuous  sodium chloride flush 0.9 % injection 5-40 mL, 5-40 mL, Intravenous, 2 times per day  sodium chloride flush 0.9 % injection 5-40 mL, 5-40 mL, Intravenous, PRN  0.9 % sodium chloride infusion, 25 mL, Intravenous, PRN  lidocaine PF 1 % injection 1 mL, 1 mL, Intradermal, Once PRN  midazolam PF (VERSED) injection 1 mg, 1 mg, Intravenous, Q10 Min PRN  promethazine (PHENERGAN) tablet 12.5 mg, 12.5 mg, Oral, Q6H PRN **OR** ondansetron (ZOFRAN) injection 4 mg, 4 mg, Intravenous, Q6H PRN  hydrALAZINE (APRESOLINE) injection 5 mg, 5 mg, Intravenous, Q4H PRN  acetaminophen (TYLENOL) tablet 650 mg, 650 mg, Oral, Q4H PRN  clopidogrel (PLAVIX) tablet 75 mg, 75 mg, Oral, Daily  docusate sodium (COLACE) capsule 100 mg, 100 mg, Oral, BID  rosuvastatin (CRESTOR) tablet 20 mg, 20 mg, Oral, Nightly  amLODIPine (NORVASC) tablet 2.5 mg, 2.5 mg, Oral, Daily  [Held by provider] apixaban (ELIQUIS) tablet 2.5 mg, 2.5 mg, Oral, BID  levothyroxine (SYNTHROID) tablet 75 mcg, 75 mcg, Oral, Daily  lipase-protease-amylase (CREON) delayed release capsule 12,000 Units, 12,000 Units, Oral, TID WC  magnesium oxide (MAG-OX) tablet 400 mg, 400 mg, Oral, BID  pantoprazole (PROTONIX) tablet 40 mg, 40 mg, Oral, QAM AC  sodium chloride flush 0.9 % injection 5-40 mL, 5-40 mL, Intravenous, 2 times per day  sodium chloride flush 0.9 % injection 5-40 mL, 5-40 mL, Intravenous, PRN  0.9 % sodium chloride infusion, 25 mL, Intravenous, PRN  promethazine (PHENERGAN) tablet 12.5 mg, 12.5 mg, Oral, Q6H PRN **OR** ondansetron (ZOFRAN) injection 4 mg, 4 mg, Intravenous, Q6H PRN  polyethylene glycol (GLYCOLAX) packet 17 g, 17 g, Oral, Daily PRN  aspirin EC tablet 81 mg, 81 mg, Oral, Daily **OR** aspirin suppository 300 mg, 300 mg, Rectal, Daily    REVIEW OF SYSTEMS     CONSTITUTIONAL: Positive for fatigue and malaise   EYES: negative for double vision and photophobia    HEENT: negative for tinnitus and sore throat   RESPIRATORY: negative for cough, shortness of breath   CARDIOVASCULAR: negative for chest pain, palpitations, or syncope   GASTROINTESTINAL: negative for abdominal pain, nausea, vomiting, diarrhea, or constipation    GENITOURINARY: negative for incontinence or retention    MUSCULOSKELETAL: negative for neck or back pain, positive for extremity pain   NEUROLOGICAL: Negative for seizures, headaches, weakness, numbness, confusion, aphasia, dysarthria    PSYCHIATRIC: negative for agitation,  SI/HI   SKIN Negative for spontaneous contusions, rashes, or lesions      PHYSICAL EXAM:     BP (!) 91/49   Pulse (!) 48   Temp 97.5 °F (36.4 °C) (Temporal)   Resp 14   Ht 5' 5\" (1.651 m)   Wt 121 lb (54.9 kg)   SpO2 97%   BMI 20.14 kg/m²     Estimated body mass index is 20.14 kg/m² as calculated from the following:    Height as of this encounter: 5' 5\" (1.651 m). Weight as of this encounter: 121 lb (54.9 kg).  []<16 Severe malnutrition  []16-16.99 Moderate malnutrition  [x]17-18.49 Mild malnutrition  []18.5-24.9 Normal  []25-29.9 Overweight (not obese)  []30-34.9 Obese class 1 (Low Risk)  []35-39.9 Obese class 2 (Moderate Risk)  []?40 Obese class 3 (High Risk)    PHYSICAL EXAM:  CONSTITUTIONAL:  Well developed, well nourished, alert and oriented x 3, in no acute distress. GCS 15. Nontoxic. No dysarthria. No aphasia. HEAD:  normocephalic, atraumatic    EYES:  PERRLA, EOMI.   ENT:  moist mucous membranes   LUNGS:  Equal air entry bilaterally   CARDIOVASCULAR:  normal s1 / s2   ABDOMEN:  Soft, no rigidity   NECK supple, symmetric, no midline tenderness to palpation    BACK without midline tenderness, step-offs or deformities    EXTREMITIES Normal ROM with no deformities   NEUROLOGIC:  Mental Status:  A & O x3,awake             Cranial Nerves:    cranial nerves II-XII are grossly intact    Motor Exam:    Drift:  absent  Tone:  normal    Motor exam is symmetrical 5 out of 5 bl upper extremities.  Le exam limited as post procedure but distal 5/5    Sensory:    Touch:    Right Upper Extremity:  normal  Left Upper Extremity:  normal  Right Lower Extremity:  normal  Left Lower Extremity:  normal    Deep Tendon Reflexes:    Right Bicep:  2+  Left Bicep:  2+  Right Knee:  2+  Left Knee:  2+    Plantar Response:  Right:  downgoing  Left:  downgoing    Clonus:  absent  Miller's:  absent    Coordination/Dysmetria:    Finger to Nose:   Right:  normal  Left:  normal   Dysdiadochokinesia:  absent     SKIN No obvious ecchymosis, rashes, or lesions      LABS AND IMAGING:     RECENT LABS:    Lab Results   Component Value Date    WBC 6.4 05/13/2021    HGB 11.9 05/13/2021    HCT 36.6 05/13/2021     05/13/2021    CHOL 123 05/12/2021    TRIG 55 05/12/2021    HDL 61 05/12/2021    LDLCHOLESTEROL 51 05/12/2021    ALT 11 04/30/2021    AST 17 04/30/2021     05/15/2021    K 4.2 05/15/2021     05/15/2021    CREATININE 0.76 05/15/2021    BUN 14 05/15/2021    CO2 26 05/15/2021    TSH 3.24 04/16/2021    INR 1.0 05/12/2021    LABA1C 5.4 05/12/2021       RADIOLOGY:   Echo Limited    Result Date: 5/13/2021  Transthoracic Echocardiography Report (TTE)  Patient Name SLEE        Date of Study             05/13/2021               Jamie BACK   Date of      1935  Gender                    Female  Birth   Age          82 year(s)  Race                         Room Number  2947        Height:                   65 inch, 165.1 cm   Corporate ID [id number]    Weight:                   121 pounds, 54.9 kg  #   Patient Acct [de-identified]   BSA:         1.6 m^2      BMI:        20.14  #                                                              kg/m^2   MR #         [id number]     Sonographer               Grzegorz Harris   Accession #  [id number]  Interpreting Physician    Roberto Faith   Fellow                   Referring Nurse                           Practitioner   Interpreting             Referring Physician       Leonor Bearden  Fellow                                             *  Type of Study   TTE procedure:2D Echocardiogram, M-Mode, Doppler, Color Doppler, Bubble  Study. Procedure Date Date: 05/13/2021 Start: 11:03 AM Study Location: [facility] Technical Quality: Fair visualization Indications:CVA. History / Tech. Comments: Procedure explained to patient. H/o COVID. Patient Status: Inpatient Height: 65 inches Weight: 121 pounds BSA: 1.6 m^2 BMI: 20.14 kg/m^2 CONCLUSIONS Summary Left ventricle is normal in size Global left ventricular systolic function is normal Estimated ejection fraction is 65 % . Moderate left ventricular hypertrophy.  Signature ----------------------------------------------------------------------------  Electronically signed by Mihaela Palomino) on 2021 11:53  AM ---------------------------------------------------------------------------- ----------------------------------------------------------------------------  Electronically signed by Roberto Faith(Interpreting physician) on  2021 05:16 PM ---------------------------------------------------------------------------- FINDINGS Left Atrium Left atrium is normal in size. No evidence of patent foramen ovale by injection of agitated saline. Left Ventricle Left ventricle is normal in size Global left ventricular systolic function is normal Estimated ejection fraction is 65 % . Moderate left ventricular hypertrophy. Right Atrium Right atrium is normal in size. Right Ventricle Normal right ventricular size and function. Mitral Valve Thickened mitral valve leaflets. No mitral regurgitation. Aortic Valve Aortic valve is sclerotic but opens well. Aortic valve is trileaflet. No aortic insufficiency. Tricuspid Valve Normal tricuspid valve structure and function. Trivial tricuspid regurgitation. Pulmonic Valve The pulmonic valve is normal in structure. No pulmonic insufficiency. Pericardial Effusion No significant pericardial effusion is seen. Miscellaneous Normal aortic root dimension. E/E' average = 10.2. IVC normal diameter & inspiratory collapse indicating normal RA filling pressure .  M-mode / 2D Measurements & Calculations:   LVIDd:3.9 cm(3.7 - 5.6 cm)       Diastolic JPSJSO:21.7 ml  EDWOQ:5.7 cm(2.2 - 4.0 cm)       Systolic CMTPLX:36.5 ml  AMJD:9.4 cm(0.6 - 1.1 cm)        Aortic Root:3 cm(2.0 - 3.7 cm)  LVPWd:1.2 cm(0.6 - 1.1 cm)       LA Dimension: 2.9 cm(1.9 - 4.0 cm)  Fractional Shortenin.03 %    LA volume/Index: 26.63 ml /17m^2  Calculated LVEF (%): 79.43 %                                   RVDd:2.5 cm   Mitral:                                    Aortic   Valve Area (P1/2-Time): 2.65 cm^2          Peak Velocity: 1.24 m/s  Peak E-Wave: 0.74 m/s                      Mean Velocity: 0.87 m/s  Peak CTA HEAD NECK W CONTRAST    Result Date: 5/12/2021  EXAMINATION: CTA OF THE HEAD AND NECK WITH CONTRAST 5/12/2021 8:36 pm: TECHNIQUE: CTA of the head and neck was performed with the administration of intravenous contrast. Multiplanar reformatted images are provided for review. MIP images are provided for review. Stenosis of the internal carotid arteries measured using NASCET criteria. Dose modulation, iterative reconstruction, and/or weight based adjustment of the mA/kV was utilized to reduce the radiation dose to as low as reasonably achievable. COMPARISON: None. HISTORY: ORDERING SYSTEM PROVIDED HISTORY: L temporal stroke. TECHNOLOGIST PROVIDED HISTORY: L temporal stroke. Decision Support Exception - unselect if not a suspected or confirmed emergency medical condition->Emergency Medical Condition (MA) Reason for Exam: stroke consult,lt infarct seen on mri Acuity: Unknown Type of Exam: Unknown FINDINGS: CTA NECK: AORTIC ARCH/ARCH VESSELS: Left vertebral artery directly arises from the aortic arch, anatomical variation. Mild atherosclerotic disease at the origin of left vertebral artery from the arch. No dissection or arterial injury. No significant stenosis of the brachiocephalic or subclavian arteries. CAROTID ARTERIES: There is moderate atherosclerotic calcification of left carotid bulb extending into the origin of left internal carotid artery resulting in severe stenosis at the origin of left cervical ICA with residual lumen measuring 2 mm, consistent with 80% stenosis. Mild atherosclerotic disease of right carotid bulb extending into the origin of right internal carotid artery not resulting in hemodynamically significant stenosis. VERTEBRAL ARTERIES: No dissection, arterial injury, or significant stenosis. SOFT TISSUES: The lung apices are clear. No cervical or superior mediastinal lymphadenopathy. The larynx and pharynx are unremarkable. No acute abnormality of the salivary and thyroid glands. consistent with an acute/subacute infarct. There is corresponding abnormal increased T2/FLAIR signal intensity. There is no acute intracranial hemorrhage. No mass effect or midline shift is present. There are multiple foci of abnormal increased T2/FLAIR signal intensity within the periventricular and deep white matter of both hemispheres. The brain is otherwise of normal signal intensities. There is no sellar or suprasellar mass present. There is no ventriculomegaly or abnormal extra-axial fluid collection present. The proximal portions of the [place] demonstrate normal flow voids. ORBITS: Limited evaluation of the orbits is unremarkable. SINUSES: There is mild mucosal thickening in the maxillary sinuses. The paranasal sinuses and mastoid air cells are otherwise clear. No fluid levels are identified. BONES/SOFT TISSUES: Bone marrow signal intensity is normal.     1. Acute/subacute 5 mm cortical infarct within the superior left temporal lobe. 2. No acute intracranial hemorrhage. 3. Mild chronic small vessel ischemic changes. The findings were sent to the Radiology Results [address] at 3:24 pm on 5/12/2021to be communicated to a licensed caregiver. Labs and Images reviewed with:  [] Masoud Olguin MD      [] Sneha Lo MD      [] Sydney Gardner MD  [x] Mable Kayser, MD  --[] there are no new interval images to review.      ASSESSMENT AND PLAN:       Patient Active Problem List   Diagnosis    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    Stroke determined by clinical assessment ([facility]    Acute ischemic stroke ([facility]    Cognitive impairment    Cerebrovascular accident (CVA) ([facility]       ASSESSMENT:     This is a 82 y.o. female with  atrial fibrillation on Eliquis, hyperlipidemia, hypertension, hypothyroidism, admitted on 5/12/2021 with complaint of short-term memory difficulties,symptomatic left ICA stenosis, MRI 5/12/2021: Acute/subacute 5 mm cortical infarction within the superior left temporal lobe, CTA head and neck 2021: Severe 80% stenosis at the origin of left cervical ICA, mild stenosis at the origin of left vertebral artery which statically arises from the arch    Patient care will be discussed with attending, will reevaluate patient along with attending     PLAN/MEDICAL DECISION MAKING:    NEUROLOGIC:  - post procedure precautions. Leg straight for three hours. Groin checks per protocol.   - Neuro checks per protocol  - sbp goal .   - plan to resume aspirin and plavix starting tomorrow for two weeks, then asa 81 and eliquis thereafter. CARDIOVASCULAR:  - sbp goal . Continue home antihypertensives, labetalol and hydralazine prn for sbp > 140  - Continue telemetry monitoring     PULMONARY:  - saturating fine on room air. RENAL/FLUID/ELECTROLYTE:    Lab Results   Component Value Date     05/15/2021    K 4.2 05/15/2021     05/15/2021    CO2 26 05/15/2021    BUN 14 05/15/2021    CREATININE 0.76 05/15/2021    GLUCOSE 92 05/15/2021    GLUCOSE 81 2021    CALCIUM 9.0 05/15/2021      ivf as needed for pressure support.      GI/NUTRITION:  - Diet: DIET GENERAL;      HEME/ID:    Recent Labs     21  0614 21  1947 21  1545   WBC 6.4 8.5 9.18   HGB 11.9 12.7 15.2*   HCT 36.6 37.5 42.6   MCV 98.1 95.7 91.6    255 291       Temperature Range: Temp: 97.5 °F (36.4 °C) Temp  Av.5 °F (36.4 °C)  Min: 97.3 °F (36.3 °C)  Max: 98.1 °F (36.7 °C)      ENDOCRINE:  - monitor blood glucose      OTHER:  - PT/OT eval       DVT PROPHYLAXIS:  - SCD sleeves - Thigh High   -Lovenox        DISPOSITION: neuro icu for post procedure monitoring      Galo Devine MD  Neurology Resident, PGY-3  Neuro Critical Care Service   5/15/2021     12:44 PM

## 2021-05-15 NOTE — PROGRESS NOTES
Neurology Resident Progress Note      SUBJECTIVE:  This is a 82 y.o.  female admitted 5/12/2021 for Stroke determined by clinical assessment [facility]) [I63.9]  This is a follow-up neurology progress note. The patient was seen and examined and the chart was reviewed. There were no acute events overnight. ROS  n/a    HPI  82years old female admitted on 5/12/2021 with complaint of short-term memory difficulties, since she got COVID-19 infection on Thanksgiving, she has had a syncopal event along with memory complaint to be admitted to [facility] for 5 days, remote complaints have persisted, patient placed on Aricept to tolerate having nightmares    -MRI 5/12/2021: Acute/subacute 5 mm cortical infarction within the superior left temporal lobe,  -CTA head and neck 5/12/2021: Severe 80% stenosis at the origin of left cervical ICA, mild stenosis at the origin of left vertebral artery which statically arises from the arch  -EEG 5/6/2021: Within normal limits     sodium chloride flush  5-40 mL Intravenous 2 times per day    clopidogrel  75 mg Oral Daily    docusate sodium  100 mg Oral BID    rosuvastatin  20 mg Oral Nightly    amLODIPine  2.5 mg Oral Daily    [Held by provider] apixaban  2.5 mg Oral BID    levothyroxine  75 mcg Oral Daily    lipase-protease-amylase  12,000 Units Oral TID WC    magnesium oxide  400 mg Oral BID    pantoprazole  40 mg Oral QAM AC    sodium chloride flush  5-40 mL Intravenous 2 times per day    aspirin  81 mg Oral Daily    Or    aspirin  300 mg Rectal Daily       Past Medical History:   Diagnosis Date    Acid reflux     Atrial fibrillation ([address]     CARDIOLOGIST -   [address] [place] [address]    Hyperlipidemia     Thyroid disease     TIA (transient ischemic attack)     SPENT 3 WKS IN REHAB    Wears dentures     UPPER       Past Surgical History:   Procedure Laterality Date    BLADDER SURGERY      suspension    HERNIA REPAIR  2012    x2    HYSTERECTOMY         PHYSICAL EXAM:      Blood pressure (!) 169/72, pulse 56, temperature 97.5 °F (36.4 °C), temperature source Temporal, resp. rate 18, height 5' 5\" (1.651 m), weight 121 lb (54.9 kg), SpO2 98 %. Unable to perform PE because pt was already in the Cath lab for diagnostic angio      Investigations:      Laboratory Testing:  Recent Results (from the past 24 hour(s))   COVID-19, Rapid    Collection Time: 05/14/21 11:24 PM    Specimen: Nasopharyngeal Swab   Result Value Ref Range    Specimen Description . NASOPHARYNGEAL SWAB     SARS-CoV-2, Rapid Not Detected Not Detected   BASIC METABOLIC PANEL    Collection Time: 05/15/21  3:33 AM   Result Value Ref Range    Glucose 92 70 - 99 mg/dL    BUN 14 8 - 23 mg/dL    CREATININE 0.76 0.50 - 0.90 mg/dL    Bun/Cre Ratio NOT REPORTED 9 - 20    Calcium 9.0 8.6 - 10.4 mg/dL    Sodium 135 135 - 144 mmol/L    Potassium 4.2 3.7 - 5.3 mmol/L    Chloride 100 98 - 107 mmol/L    CO2 26 20 - 31 mmol/L    Anion Gap 9 9 - 17 mmol/L    GFR Non-African American >60 >60 mL/min    GFR African American >60 >60 mL/min    GFR Comment          GFR Staging NOT REPORTED    PLATELET FUNCTION TEST    Collection Time: 05/15/21  3:33 AM   Result Value Ref Range    WINDY/EPI Clos Time 132 85 - 172 sec    Collagen Adenosine-5'-Diphosphate (Adp) Time 73 67 - 112 sec    Platelet Function Interp       Normal platelet function. If patient clinical history/Physical examination is positive for a bleeding diathesis, recommend repeat testing and/or additional primary hemostasis and/or coagulation studies.    TYPE AND SCREEN    Collection Time: 05/15/21  3:33 AM   Result Value Ref Range    Expiration Date 05/18/2021,6981     Arm Band Number [id number]     ABO/Rh A POSITIVE     Antibody Screen NEGATIVE    BLOOD BANK SPECIMEN    Collection Time: 05/15/21  3:33 AM   Result Value Ref Range    Blood Bank Specimen NOT REPORTED    Activated clotting time    Collection Time: 05/15/21  8:30 AM   Result Value Ref Range    Activated Clotting Time 148 79 - 149 sec Imaging/Diagnostics:  Echo Limited    Result Date: 5/13/2021  Transthoracic Echocardiography Report (TTE)  Patient Name SLEE        Date of Study             05/13/2021               Fredonia Goltz   Date of      1935  Gender                    Female  Birth   Age          82 year(s)  Race                         Room Number  7651        Height:                   65 inch, 165.1 cm   Corporate ID [id number]    Weight:                   121 pounds, 54.9 kg  #   Patient Acct [de-identified]   BSA:         1.6 m^2      BMI:        20.14  #                                                              kg/m^2   MR #         [id number]     Sonographer               Negar Townsend   Accession #  [id number]  Interpreting Physician    Roberto Faith   Fellow                   Referring Nurse                           Practitioner   Interpreting             Referring Physician       Marlin Dietrich  Fellow                                             *  Type of Study   TTE procedure:2D Echocardiogram, M-Mode, Doppler, Color Doppler, Bubble  Study. Procedure Date Date: 05/13/2021 Start: 11:03 AM Study Location: [facility] Technical Quality: Fair visualization Indications:CVA. History / Tech. Comments: Procedure explained to patient. H/o COVID. Patient Status: Inpatient Height: 65 inches Weight: 121 pounds BSA: 1.6 m^2 BMI: 20.14 kg/m^2 CONCLUSIONS Summary Left ventricle is normal in size Global left ventricular systolic function is normal Estimated ejection fraction is 65 % . Moderate left ventricular hypertrophy.  Signature ----------------------------------------------------------------------------  Electronically signed by Howard Hardy) on 05/13/2021 11:53  AM ---------------------------------------------------------------------------- ----------------------------------------------------------------------------  Electronically signed by Roberto Faith(Interpreting physician) on  05/13/2021 05:16 PM ---------------------------------------------------------------------------- FINDINGS Left Atrium Left atrium is normal in size. No evidence of patent foramen ovale by injection of agitated saline. Left Ventricle Left ventricle is normal in size Global left ventricular systolic function is normal Estimated ejection fraction is 65 % . Moderate left ventricular hypertrophy. Right Atrium Right atrium is normal in size. Right Ventricle Normal right ventricular size and function. Mitral Valve Thickened mitral valve leaflets. No mitral regurgitation. Aortic Valve Aortic valve is sclerotic but opens well. Aortic valve is trileaflet. No aortic insufficiency. Tricuspid Valve Normal tricuspid valve structure and function. Trivial tricuspid regurgitation. Pulmonic Valve The pulmonic valve is normal in structure. No pulmonic insufficiency. Pericardial Effusion No significant pericardial effusion is seen. Miscellaneous Normal aortic root dimension. E/E' average = 10.2. IVC normal diameter & inspiratory collapse indicating normal RA filling pressure .  M-mode / 2D Measurements & Calculations:   LVIDd:3.9 cm(3.7 - 5.6 cm)       Diastolic FCUWVY:71.9 ml  CYMIH:8.2 cm(2.2 - 4.0 cm)       Systolic XHEUBE:65.7 ml  PFYU:2.3 cm(0.6 - 1.1 cm)        Aortic Root:3 cm(2.0 - 3.7 cm)  LVPWd:1.2 cm(0.6 - 1.1 cm)       LA Dimension: 2.9 cm(1.9 - 4.0 cm)  Fractional Shortenin.03 %    LA volume/Index: 26.63 ml /17m^2  Calculated LVEF (%): 79.43 %                                   RVDd:2.5 cm   Mitral:                                    Aortic   Valve Area (P1/2-Time): 2.65 cm^2          Peak Velocity: 1.24 m/s  Peak E-Wave: 0.74 m/s                      Mean Velocity: 0.87 m/s  Peak A-Wave: 0.66 m/s                      Peak Gradient: 6.15 mmHg  E/A Ratio: 1.12                            Mean Gradient: 3 mmHg  Peak Gradient: 2.17 mmHg  Mean Gradient: 1 mmHg  Deceleration Time: 282 msec                AV VTI: 30.3 cm  P1/2t: 83 msec Mean Velocity: 0.49 m/s   Tricuspid:                                 Pulmonic:   Peak TR Velocity: 2.11 m/s                 Peak Velocity: 0.71 m/s  Peak TR Gradient: 17.8084 mmHg             Peak Gradient: 1.99 mmHg  Diastology / Tissue Doppler Septal Wall E' velocity:0.07 m/s Septal Wall E/E':10.9 Lateral Wall E' velocity:0.08 m/s Lateral Wall E/E':9.5    Ct Head Wo Contrast    Result Date: 5/12/2021  EXAMINATION: CT OF THE HEAD WITHOUT CONTRAST  5/12/2021 8:45 pm TECHNIQUE: CT of the head was performed without the administration of intravenous contrast. Dose modulation, iterative reconstruction, and/or weight based adjustment of the mA/kV was utilized to reduce the radiation dose to as low as reasonably achievable. COMPARISON: MR brain from today HISTORY: ORDERING SYSTEM PROVIDED HISTORY: stroke consult, left temporal stroke on MRI TECHNOLOGIST PROVIDED HISTORY: stroke consult, left temporal stroke on MRI Reason for Exam: stroke consult,lt temporal stroke on mri Acuity: Unknown Type of Exam: Unknown FINDINGS: BRAIN/VENTRICLES: There is no acute intracranial hemorrhage, mass effect or midline shift. No abnormal extra-axial fluid collection. The gray-white differentiation is maintained without evidence of an acute infarct. There is no evidence of hydrocephalus. Idiopathic calcifications in the basal ganglia. Intracranial atherosclerosis. ORBITS: The visualized portion of the orbits demonstrate no acute abnormality. SINUSES: Air-fluid level right maxillary sinus. Polyp left maxillary sinus. SOFT TISSUES/SKULL:  No acute abnormality of the visualized skull or soft tissues. Idiopathic basal ganglia calcifications. Sinusitis. Otherwise no acute disease.      Xr Chest Portable    Result Date: 4/30/2021  Evaluate for Atelectasis [facility] Department of Radiology [address], [place] [phone] ========================================================================== Patient Name: Rudy Ivan : 1935 Sex: F Age: Melodie Lopez MRN: [id number] Pt. Location: [facility] Patient Status: E Visit #: [id number] Ordered Date: 2021 4:00:00 PM Completed Date: 2021 04:29 PM Requesting Provider: Amy Brambila Attending Provider: Mary Gaspar Report Copy To: Signs ^ Symptoms: Shortness of Breath History: Comments: Evaluate for Atelectasis Exam: PORTABLE CHEST 1 VIEW Accession #: [id number] ========================================================================== PORTABLE CHEST 1 VIEW  2021 4:29 PM  CLINICAL INDICATIONS: Shortness of Breath  TECHNOLOGIST COMMENTS: Shortness of breath  QUESTION FOR THE RADIOLOGIST: Evaluate for Atelectasis  PROTOCOL: AP(PA) view was obtained. COMPARISON: December 15, 2020  FINDINGS: Upper mediastinum unremarkable. Hilar regions normal. Heart normal size. Right lung is clear. Left lung is clear. Bony thorax unremarkable. IMPRESSION: No acute cardiopulmonary abnormality or interval change. Electronically signed: Aggie Parada. Transcribed by: [place], User Resident: Electronically Signed by: An Linares @ 2021 04:39 PM    Cta Head Neck W Contrast    Result Date: 2021  EXAMINATION: CTA OF THE HEAD AND NECK WITH CONTRAST 2021 8:36 pm: TECHNIQUE: CTA of the head and neck was performed with the administration of intravenous contrast. Multiplanar reformatted images are provided for review. MIP images are provided for review. Stenosis of the internal carotid arteries measured using NASCET criteria. Dose modulation, iterative reconstruction, and/or weight based adjustment of the mA/kV was utilized to reduce the radiation dose to as low as reasonably achievable. COMPARISON: None. HISTORY: ORDERING SYSTEM PROVIDED HISTORY: L temporal stroke. TECHNOLOGIST PROVIDED HISTORY: L temporal stroke.  Decision Support Exception - unselect if not a suspected or confirmed emergency medical condition->Emergency Medical Condition (MA) Reason for Exam: stroke consult,lt infarct seen on mri Acuity: Unknown Type of Exam: Unknown FINDINGS: CTA NECK: AORTIC ARCH/ARCH VESSELS: Left vertebral artery directly arises from the aortic arch, anatomical variation. Mild atherosclerotic disease at the origin of left vertebral artery from the arch. No dissection or arterial injury. No significant stenosis of the brachiocephalic or subclavian arteries. CAROTID ARTERIES: There is moderate atherosclerotic calcification of left carotid bulb extending into the origin of left internal carotid artery resulting in severe stenosis at the origin of left cervical ICA with residual lumen measuring 2 mm, consistent with 80% stenosis. Mild atherosclerotic disease of right carotid bulb extending into the origin of right internal carotid artery not resulting in hemodynamically significant stenosis. VERTEBRAL ARTERIES: No dissection, arterial injury, or significant stenosis. SOFT TISSUES: The lung apices are clear. No cervical or superior mediastinal lymphadenopathy. The larynx and pharynx are unremarkable. No acute abnormality of the salivary and thyroid glands. BONES: No acute osseous abnormality. 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 No significant stenosis of the intracranial internal carotid, anterior cerebral, or middle cerebral arteries. No aneurysm. POSTERIOR CIRCULATION: No significant stenosis of the vertebral, basilar, or posterior cerebral arteries. No aneurysm. 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 OTHER: No dural venous sinus thrombosis on this non-dedicated study. BRAIN: No mass effect or midline shift. No extra-axial fluid collection. The gray-white differentiation is maintained. Moderate mucosal thickening of maxillary sinuses.      Severe 80% stenosis at the origin of left cervical ICA. Mild stenosis at the origin of left vertebral artery which directly arises from the arch. Unremarkable CTA of the head with anatomical variation as described. ================================== CAROTID STENOSIS REFERENCE: The [employer] Symptomatic Carotid Endarterectomy Trial (NASCET) is a method of quantifying internal carotid artery stenosis. Distal internal carotid artery diameter as the denominator for stenosis measurement: MILD = <50% stenosis. MODERATE =  50-69% stenosis. SEVERE =  70-89% stenosis. HAIRLINE/CRITICAL = 90-99% stenosis. OCCLUDED = 100% stenosis. ==================================     Mri Brain Wo Contrast    Result Date: 5/12/2021  EXAMINATION: MRI OF THE BRAIN WITHOUT CONTRAST  5/12/2021 2:02 pm TECHNIQUE: Multiplanar multisequence MRI of the brain was performed without the administration of intravenous contrast. COMPARISON: None. HISTORY: ORDERING SYSTEM PROVIDED HISTORY: 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 TECHNOLOGIST PROVIDED HISTORY: Reason for Exam: patient states dizziness and nausea x1 month FINDINGS: INTRACRANIAL STRUCTURES/VENTRICLES: There is a 5 mm focus of restricted diffusion within the cortex of the superior left temporal lobe consistent with an acute/subacute infarct. There is corresponding abnormal increased T2/FLAIR signal intensity. There is no acute intracranial hemorrhage. No mass effect or midline shift is present. There are multiple foci of abnormal increased T2/FLAIR signal intensity within the periventricular and deep white matter of both hemispheres. The brain is otherwise of normal signal intensities. There is no sellar or suprasellar mass present. There is no ventriculomegaly or abnormal extra-axial fluid collection present. The proximal portions of the [place] demonstrate normal flow voids. ORBITS: Limited evaluation of the orbits is unremarkable. SINUSES: There is mild mucosal thickening in the maxillary sinuses.   The loaded with aspirin 324 mg then maintenance dose of 81 mg  -Resume atorvastatin  -Folate: 11.2 B12: 695 TSH:  -Lipid panel: LDL: 51, T, cholesterol: 123, HDL: 61,  -A1c: 5.4, ESR: 15,  -PT/OT/SLP  -Echocardiogram: EF: 65%, moderate left ventricular hypertrophy, no structural defects        Bev Wheeler MD, 5/15/2021 8:53 AM

## 2021-05-16 PROCEDURE — 97530 THERAPEUTIC ACTIVITIES: CPT

## 2021-05-16 PROCEDURE — 99232 SBSQ HOSP IP/OBS MODERATE 35: CPT | Performed by: PSYCHIATRY & NEUROLOGY

## 2021-05-16 PROCEDURE — 99233 SBSQ HOSP IP/OBS HIGH 50: CPT | Performed by: PSYCHIATRY & NEUROLOGY

## 2021-05-16 PROCEDURE — 2580000003 HC RX 258: Performed by: ANESTHESIOLOGY

## 2021-05-16 PROCEDURE — 6370000000 HC RX 637 (ALT 250 FOR IP): Performed by: STUDENT IN AN ORGANIZED HEALTH CARE EDUCATION/TRAINING PROGRAM

## 2021-05-16 PROCEDURE — 97164 PT RE-EVAL EST PLAN CARE: CPT

## 2021-05-16 PROCEDURE — 2580000003 HC RX 258: Performed by: STUDENT IN AN ORGANIZED HEALTH CARE EDUCATION/TRAINING PROGRAM

## 2021-05-16 PROCEDURE — 2060000000 HC ICU INTERMEDIATE R&B

## 2021-05-16 RX ORDER — SODIUM CHLORIDE 9 MG/ML
INJECTION, SOLUTION INTRAVENOUS CONTINUOUS
Status: DISCONTINUED | OUTPATIENT
Start: 2021-05-16 | End: 2021-05-16

## 2021-05-16 RX ADMIN — AMLODIPINE BESYLATE 2.5 MG: 2.5 TABLET ORAL at 08:56

## 2021-05-16 RX ADMIN — DOCUSATE SODIUM 100 MG: 100 CAPSULE ORAL at 20:19

## 2021-05-16 RX ADMIN — MAGNESIUM GLUCONATE 500 MG ORAL TABLET 400 MG: 500 TABLET ORAL at 20:19

## 2021-05-16 RX ADMIN — SODIUM CHLORIDE, PRESERVATIVE FREE 10 ML: 5 INJECTION INTRAVENOUS at 20:20

## 2021-05-16 RX ADMIN — SODIUM CHLORIDE, PRESERVATIVE FREE 10 ML: 5 INJECTION INTRAVENOUS at 20:19

## 2021-05-16 RX ADMIN — LEVOTHYROXINE SODIUM 75 MCG: 75 TABLET ORAL at 08:56

## 2021-05-16 RX ADMIN — PANCRELIPASE 12000 UNITS: 30000; 6000; 19000 CAPSULE, DELAYED RELEASE PELLETS ORAL at 18:20

## 2021-05-16 RX ADMIN — ROSUVASTATIN CALCIUM 20 MG: 20 TABLET, FILM COATED ORAL at 20:19

## 2021-05-16 RX ADMIN — MAGNESIUM GLUCONATE 500 MG ORAL TABLET 400 MG: 500 TABLET ORAL at 09:02

## 2021-05-16 RX ADMIN — PANCRELIPASE 12000 UNITS: 30000; 6000; 19000 CAPSULE, DELAYED RELEASE PELLETS ORAL at 08:56

## 2021-05-16 RX ADMIN — SODIUM CHLORIDE: 9 INJECTION, SOLUTION INTRAVENOUS at 08:47

## 2021-05-16 RX ADMIN — Medication 81 MG: at 08:56

## 2021-05-16 RX ADMIN — DOCUSATE SODIUM 100 MG: 100 CAPSULE ORAL at 08:56

## 2021-05-16 RX ADMIN — PANTOPRAZOLE SODIUM 40 MG: 40 TABLET, DELAYED RELEASE ORAL at 08:56

## 2021-05-16 RX ADMIN — PANCRELIPASE 12000 UNITS: 30000; 6000; 19000 CAPSULE, DELAYED RELEASE PELLETS ORAL at 13:43

## 2021-05-16 RX ADMIN — CLOPIDOGREL 75 MG: 75 TABLET, FILM COATED ORAL at 08:56

## 2021-05-16 ASSESSMENT — PAIN SCALES - GENERAL
PAINLEVEL_OUTOF10: 0

## 2021-05-16 ASSESSMENT — PAIN DESCRIPTION - PAIN TYPE: TYPE: ACUTE PAIN

## 2021-05-16 NOTE — PROGRESS NOTES
Physical Therapy    Facility/Department: [address]  Initial Assessment    NAME: Shanita Molina  : 1935  MRN: [id number]    Date of Service: 2021  Date of Procedure: 5/15/2021  Primary Care Physician: eLon Cool DO     Pre-Procedural Diagnosis: Left MCA ischemic stroke due to symptomatic left severe cervical ICA stenosis. Post-Procedural Diagnosis: S/p successful left cervical ICA stent     Procedure Performed:cerebral angiogram with cervical carotid stenting and balloon angioplasty with distal embolic protection device left MCA ischemic stroke. Discharge Recommendations:  Patient would benefit from continued therapy after discharge        Assessment   Body structures, Functions, Activity limitations: Decreased balance;Decreased endurance;Decreased functional mobility ; Decreased safe awareness  Assessment: Patient is receptive to education, highly motivated to return to independent lifestyle. Able to ambulate 200' with her rollator, gait deficits only presenting once she was getting fatigued. Patient needs further PT to regain full independence. Prognosis: Good  Decision Making: Medium Complexity  PT Education: Plan of Care;General Safety  REQUIRES PT FOLLOW UP: Yes  Activity Tolerance  Activity Tolerance: Patient Tolerated treatment well       Patient Diagnosis(es): The encounter diagnosis was Cerebrovascular accident (CVA), unspecified mechanism ([address] has a past medical history of Acid reflux, Atrial fibrillation ([address] Hyperlipidemia, Thyroid disease, TIA (transient ischemic attack), and Wears dentures. has a past surgical history that includes Hysterectomy; hernia repair (); and Bladder surgery.     Restrictions  Restrictions/Precautions  Restrictions/Precautions: Up as Tolerated, Fall Risk  Required Braces or Orthoses?: No  Vision/Hearing  Vision: Within Functional Limits  Hearing: Within functional limits     Subjective  General  Patient assessed for rehabilitation services?: Yes  Family / Caregiver Present: No  Follows Commands: Within Functional Limits  Pain Screening  Patient Currently in Pain: Denies  Vital Signs  Patient Currently in Pain: Denies       Orientation  Orientation  Overall Orientation Status: Within Functional Limits  Social/Functional History  Social/Functional History  Lives With: Alone  Type of Home:  (Condo, 2nd floor)  Home Layout: One level  Home Access: Elevator  Bathroom Shower/Tub: Walk-in shower  Bathroom Toilet: Standard  Bathroom Equipment: Grab bars in shower, Shower chair  Home Equipment: 4 wheeled walker  ADL Assistance: Needs assistance  Bath:  (Pt afraid of falling. Pt waits till daughter is present.)  Homemaking Assistance: Independent  Homemaking Responsibilities: Yes  Ambulation Assistance: Independent  Transfer Assistance: Independent  Active : No  Patient's  Info: daughters  Occupation: Retired  Leisure & Hobbies: playing checkers, taking walks  Cognition   Cognition  Overall Cognitive Status: Exceptions  Following Commands: Follows one step commands with repetition  Attention Span: Attends with cues to redirect  Memory: Decreased recall of precautions  Safety Judgement: Decreased awareness of need for assistance;Decreased awareness of need for safety  Insights: Decreased awareness of deficits    Objective    AROM RLE (degrees)  RLE AROM: WFL  AROM LLE (degrees)  LLE AROM : WFL  AROM RUE (degrees)  RUE AROM : WFL  AROM LUE (degrees)  LUE AROM : WFL  Strength RLE  Strength RLE: WFL  Strength LLE  Strength LLE: WFL  Strength RUE  Strength RUE: WFL  Strength LUE  Strength LUE: WFL  Motor Control  Gross Motor?: WNL  Sensation  Overall Sensation Status: WFL  Bed mobility  Rolling to Left: Supervision  Supine to Sit: Supervision  Sit to Supine: Supervision  Scooting: Supervision  Pt sat EOB SBA. Transfers  Sit to Stand: Contact guard assistance  Stand to sit: Contact guard assistance  Performed x 4.   Ambulation  Ambulation?: Yes  Ambulation 1  Surface: level tile  Device: Rolling Walker  Assistance: Contact guard assistance  Quality of Gait: .  Gait Deviations: None  Distance: 300 ft   Pt ambulated to the bathroom and used it requiring only SBA. Balance  Posture: Good  Sitting - Static: Good  Sitting - Dynamic: Good  Standing - Static: Good  Standing - Dynamic: Good;-  Comments: Standing balance with 4WW. Plan   Plan  Times per week: 5-6 visits weekly  Times per day: Daily  Current Treatment Recommendations: Balance Training, Endurance Training, Gait Training, Strengthening, Safety Education & Training, Transfer Training, Patient/Caregiver Education & Training, Home Exercise Program, Functional Mobility Training, Neuromuscular Re-education  Safety Devices  Type of devices: Left in bed, Call light within reach, Nurse notified, Gait belt  Restraints  Initially in place: No    Goals  Short term goals  Time Frame for Short term goals: 10 visits  Short term goal 1: Indpendent bed mobility  Short term goal 2: Independent transfers  Short term goal 3: Independent ambulation with 4WW 300 ft  Short term goal 4: pt to demonstrate good safety awareness with mobility.   Patient Goals   Patient goals : Go home       Therapy Time   Individual Concurrent Group Co-treatment   Time In 0805         Time Out 9760         Minutes 30         Timed Code Treatment Minutes: [facility], PT

## 2021-05-16 NOTE — CARE COORDINATION
Called [facility] to inquire about Precert for patient.   Left VM with [place] from Admissions with request for CB

## 2021-05-16 NOTE — PROGRESS NOTES
ENDOVASCULAR NEUROSURGERY PROGRESS NOTE  2021 9:21 AM  Subjective:   Admit Date: 2021  PCP: Anju Lux, DO    S/p successful left cervical ICA stent on 5/15. No new acute events over the night. Objective:   Vitals: BP (!) 143/42   Pulse (!) 45   Temp 97.4 °F (36.3 °C) (Oral)   Resp 15   Ht 5' 5\" (1.651 m)   Wt 121 lb (54.9 kg)   SpO2 97%   BMI 20.14 kg/m²   General appearance: Lying in bed, NAD. HEENT: Atraumatic. Neck: Neck is supple. Lungs: No respiratory distress noted. Heart: normal sinus rhythm on tele. .   Abdomen: Soft nontender. Extremities: No lower limb edema noted. Neurologic:  awake, following simple commands appropriately, able to name simple objects, intact comprehension, no dysarthria noted. CN: Has intact extraocular muscles movements, no facial droop noted  MOTOR: Has good strength in both upper and lower extremities, moving both upper and lower extremities against gravity with no drift. SENSORY: Intact sensation to simple touch bilaterally in both upper and lower extremities. NIH Stroke Scale Total:  1a.  Level of consciousness:  0  1b. Level of consciousness questions:  0   1c. Level of consciousness questions:  0   2. Best Gaze:  0   3. Visual:  0   4. Facial Palsy:  0   5a. Motor left arm:  0  5b. Motor right arm:  0  6a. Motor left le  6b. Motor right le  7. Limb Ataxia:  0 - absent  8. Sensory:  0 - normal; no sensory loss  9. Best Language:  0  10. Dysarthria:  0  11.   Extinction and Inattention: 0     Total: 0    Medications and labs:   Scheduled Meds:   sodium chloride flush  5-40 mL Intravenous 2 times per day    clopidogrel  75 mg Oral Daily    docusate sodium  100 mg Oral BID    rosuvastatin  20 mg Oral Nightly    amLODIPine  2.5 mg Oral Daily    [Held by provider] apixaban  2.5 mg Oral BID    levothyroxine  75 mcg Oral Daily    lipase-protease-amylase  12,000 Units Oral TID     magnesium oxide  400 mg Oral BID    pantoprazole  40 mg Oral QAM AC    sodium chloride flush  5-40 mL Intravenous 2 times per day    aspirin  81 mg Oral Daily    Or    aspirin  300 mg Rectal Daily     Continuous Infusions:   sodium chloride 125 mL/hr at 05/16/21 0847    sodium chloride      sodium chloride       CBC:   No results for input(s): WBC, HGB, PLT in the last 72 hours. BMP:    Recent Labs     05/15/21  0333      K 4.2      CO2 26   BUN 14   CREATININE 0.76   GLUCOSE 92     Hepatic: No results for input(s): AST, ALT, ALB, BILITOT, ALKPHOS in the last 72 hours. Troponin: No results for input(s): TROPONINI in the last 72 hours. BNP: No results for input(s): BNP in the last 72 hours. Lipids:   No results for input(s): CHOL, HDL in the last 72 hours. Invalid input(s): LDLCALCU  INR:   No results for input(s): INR in the last 72 hours. Cta Head Neck W Contrast     Result Date: 5/12/2021  EXAMINATION: CTA OF THE HEAD AND NECK WITH CONTRAST 5/12/2021 8:36 pm: TECHNIQUE: CTA of the head and neck was performed with the administration of intravenous contrast. Multiplanar reformatted images are provided for review. MIP images are provided for review. Stenosis of the internal carotid arteries measured using NASCET criteria. Dose modulation, iterative reconstruction, and/or weight based adjustment of the mA/kV was utilized to reduce the radiation dose to as low as reasonably achievable. COMPARISON: None. HISTORY: ORDERING SYSTEM PROVIDED HISTORY: L temporal stroke. TECHNOLOGIST PROVIDED HISTORY: L temporal stroke. Decision Support Exception - unselect if not a suspected or confirmed emergency medical condition->Emergency Medical Condition (MA) Reason for Exam: stroke consult,lt infarct seen on mri Acuity: Unknown Type of Exam: Unknown FINDINGS: CTA NECK: AORTIC ARCH/ARCH VESSELS: Left vertebral artery directly arises from the aortic arch, anatomical variation.   Mild atherosclerotic disease at the origin of left vertebral artery from the arch. No dissection or arterial injury. No significant stenosis of the brachiocephalic or subclavian arteries. CAROTID ARTERIES: There is moderate atherosclerotic calcification of left carotid bulb extending into the origin of left internal carotid artery resulting in severe stenosis at the origin of left cervical ICA with residual lumen measuring 2 mm, consistent with 80% stenosis. Mild atherosclerotic disease of right carotid bulb extending into the origin of right internal carotid artery not resulting in hemodynamically significant stenosis. VERTEBRAL ARTERIES: No dissection, arterial injury, or significant stenosis. SOFT TISSUES: The lung apices are clear. No cervical or superior mediastinal lymphadenopathy. The larynx and pharynx are unremarkable. No acute abnormality of the salivary and thyroid glands. BONES: No acute osseous abnormality. 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 No significant stenosis of the intracranial internal carotid, anterior cerebral, or middle cerebral arteries. No aneurysm. POSTERIOR CIRCULATION: No significant stenosis of the vertebral, basilar, or posterior cerebral arteries. No aneurysm. 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 OTHER: No dural venous sinus thrombosis on this non-dedicated study. BRAIN: No mass effect or midline shift. No extra-axial fluid collection. The gray-white differentiation is maintained. Moderate mucosal thickening of maxillary sinuses.      Severe 80% stenosis at the origin of left cervical ICA. Mild stenosis at the origin of left vertebral artery which directly arises from the arch.  Unremarkable CTA of the head with anatomical variation as described. ================================== CAROTID STENOSIS REFERENCE: The [employer] Symptomatic Carotid Endarterectomy Trial (NASCET) is a method of quantifying internal carotid artery stenosis. Distal internal carotid artery diameter as the denominator for stenosis measurement: MILD = <50% stenosis. MODERATE =  50-69% stenosis. SEVERE =  70-89% stenosis. HAIRLINE/CRITICAL = 90-99% stenosis. OCCLUDED = 100% stenosis. ==================================      Mri Brain Wo Contrast     Result Date: 5/12/2021  EXAMINATION: MRI OF THE BRAIN WITHOUT CONTRAST  5/12/2021 2:02 pm TECHNIQUE: Multiplanar multisequence MRI of the brain was performed without the administration of intravenous contrast. COMPARISON: None. HISTORY: ORDERING SYSTEM PROVIDED HISTORY: 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 TECHNOLOGIST PROVIDED HISTORY: Reason for Exam: patient states dizziness and nausea x1 month FINDINGS: INTRACRANIAL STRUCTURES/VENTRICLES: There is a 5 mm focus of restricted diffusion within the cortex of the superior left temporal lobe consistent with an acute/subacute infarct. There is corresponding abnormal increased T2/FLAIR signal intensity. There is no acute intracranial hemorrhage. No mass effect or midline shift is present. There are multiple foci of abnormal increased T2/FLAIR signal intensity within the periventricular and deep white matter of both hemispheres. The brain is otherwise of normal signal intensities. There is no sellar or suprasellar mass present. There is no ventriculomegaly or abnormal extra-axial fluid collection present. The proximal portions of the [place] demonstrate normal flow voids. ORBITS: Limited evaluation of the orbits is unremarkable. SINUSES: There is mild mucosal thickening in the maxillary sinuses. The paranasal sinuses and mastoid air cells are otherwise clear. No fluid levels are identified. BONES/SOFT TISSUES: Bone marrow signal intensity is normal.      1. Acute/subacute 5 mm cortical infarct within the superior left temporal lobe. 2. No acute intracranial hemorrhage. 3. Mild chronic small vessel ischemic changes.  The findings were sent to the Radiology Results [address] at 3:24 pm on 5/12/2021to be communicated to a licensed caregiver. Assessment and Recommendations: This is a 74-year-old female with past medical history including atrial fibrillation on Eliquis, hyperlipidemia, hypertension, hypothyroidism. Patient presented with MRI brain revealing left MCA ischemic stroke. CTA head and neck with severe left cervical ICA stenosis. --Symptomatic left cervical ICA severe stenosis. No new acute events over the night. Neuro exam stable. S/p successful left cervical ICA stent on 5/15. We will continue dual antiplatelet therapy with aspirin 81 and Plavix 75 for 2 weeks then switch to anticoagulation with aspirin 81 mg daily. Discussed with the patient. PLAN:  --Continue dual antiplatelet therapy with aspirin and Plavix for 2 weeks. Then switch to apixaban with aspirin 81 thereafter. --Statin therapy. --Lifestyle modification. --Follow-up in neuro endovascular clinic at 2 weeks with Dr. Suresh Craven, Dr. Ramona Mccray at 3 months with repeat CTA neck.       Saud Ac MD, MD  Stroke, [facility]  [address] [place]  Electronically signed 5/16/2021 at 9:21 AM

## 2021-05-16 NOTE — PLAN OF CARE
Problem: HEMODYNAMIC STATUS  Goal: Patient has stable vital signs and fluid balance  Outcome: Ongoing     Problem: Falls - Risk of:  Goal: Will remain free from falls  Description: Will remain free from falls  Outcome: Ongoing  Goal: Absence of physical injury  Description: Absence of physical injury  Outcome: Ongoing     Problem: Skin Integrity:  Goal: Will show no infection signs and symptoms  Description: Will show no infection signs and symptoms  Outcome: Ongoing  Goal: Absence of new skin breakdown  Description: Absence of new skin breakdown  Outcome: Ongoing

## 2021-05-16 NOTE — H&P
Neurology H&P note    Patient Name: Wali Palmer  Patient : 1935  Room/Bed: 3023/5403-20  Code Status: full code  Allergies: Allergies   Allergen Reactions    Percocet [Oxycodone-Acetaminophen]       Woozy    Hydrocodone        CHIEF COMPLAINT:        Syncope, memory loss     INTERVAL HISTORY    History Obtained From: patient, emr     The patient is a 82 y.o. female  past medical history including atrial fibrillation on Eliquis, hyperlipidemia, hypertension, hypothyroidism, admitted on 2021 with complaint of short-term memory difficulties, since she got COVID-19 infection on , she has had a syncopal event along with memory complaint to be admitted to [facility] for 5 days, remote complaints have persisted, patient placed on Aricept to tolerate having nightmares     -MRI 2021: Acute/subacute 5 mm cortical infarction within the superior left temporal lobe,  -CTA head and neck 2021: Severe 80% stenosis at the origin of left cervical ICA, mild stenosis at the origin of left vertebral artery which statically arises from the arch  -EEG 2021: Within normal limits     Patient was loaded with aspirin, plavix and eliquis held for dsa today with Acculink stent placement in left ICA.      Admitted to ICU From: angio suite   Reason for ICU Admission: s/p carotid stent. Last 24h:      No acute events overnight. Maintaining sbp goals, but slightly on the lower side, bradycardiac. Walked with PT with walker, no balance issues. Feels at her baseline.  working on placement at [email or web address].         CURRENT MEDICATIONS:  SCHEDULED MEDICATIONS:   sodium chloride flush  5-40 mL Intravenous 2 times per day    clopidogrel  75 mg Oral Daily    docusate sodium  100 mg Oral BID    rosuvastatin  20 mg Oral Nightly    amLODIPine  2.5 mg Oral Daily    [Held by provider] apixaban  2.5 mg Oral BID    levothyroxine  75 mcg Oral Daily    lipase-protease-amylase  12,000 Units Oral TID   magnesium oxide  400 mg Oral BID    pantoprazole  40 mg Oral QAM AC    sodium chloride flush  5-40 mL Intravenous 2 times per day    aspirin  81 mg Oral Daily    Or    aspirin  300 mg Rectal Daily     CONTINUOUS INFUSIONS:   sodium chloride      sodium chloride      sodium chloride       PRN MEDICATIONS:   fentanNYL, sodium chloride flush, sodium chloride, midazolam, promethazine **OR** ondansetron, hydrALAZINE, acetaminophen, labetalol, sodium chloride flush, sodium chloride, promethazine **OR** ondansetron, polyethylene glycol    VITALS:  Temperature Range: Temp: 97.4 °F (36.3 °C) Temp  Av.6 °F (36.4 °C)  Min: 97.4 °F (36.3 °C)  Max: 97.8 °F (36.6 °C)  BP Range: Systolic (14MBF), NGS:378 , Min:87 , VIA:669     Diastolic (38BPV), JHN:19, Min:28, Max:94    Pulse Range: Pulse  Av.3  Min: 40  Max: 79  Respiration Range: Resp  Av.7  Min: 0  Max: 46  Current Pulse Ox: SpO2: 96 %  24HR Pulse Ox Range: SpO2  Av.5 %  Min: 89 %  Max: 100 %  Patient Vitals for the past 12 hrs:   BP Temp Temp src Pulse Resp SpO2   21 0600 (!) 101/29 -- -- (!) 43 11 96 %   21 0500 (!) 128/49 -- -- (!) 46 21 96 %   21 0400 (!) 89/40 97.4 °F (36.3 °C) Oral (!) 44 14 96 %   21 0300 -- -- -- (!) 42 13 95 %   21 0200 -- -- -- 70 21 97 %   21 0100 -- -- -- (!) 46 17 96 %   21 0000 (!) 125/58 97.8 °F (36.6 °C) Oral 57 13 (!) 89 %   05/15/21 2300 (!) 88/28 -- -- (!) 41 12 96 %   05/15/21 2200 (!) 102/55 -- -- (!) 40 10 96 %   05/15/21 2100 (!) 146/56 -- -- (!) 42 13 96 %   05/15/21 2000 (!) 11653 97.5 °F (36.4 °C) Oral (!) 46 22 97 %   05/15/21 1900 (!) 95/53 -- -- (!) 44 19 96 %     Estimated body mass index is 20.14 kg/m² as calculated from the following:    Height as of this encounter: 5' 5\" (1.651 m).     Weight as of this encounter: 121 lb (54.9 kg).  []<16 Severe malnutrition  []16-16.99 Moderate malnutrition  []17-18.49 Mild malnutrition  []18.5-24.9 Normal  []25-29.9 Overweight (not obese)  []30-34.9 Obese class 1 (Low Risk)  []35-39.9 Obese class 2 (Moderate Risk)  []?40 Obese class 3 (High Risk)    RECENT LABS:   Lab Results   Component Value Date    WBC 6.4 05/13/2021    HGB 11.9 05/13/2021    HCT 36.6 05/13/2021     05/13/2021    CHOL 123 05/12/2021    TRIG 55 05/12/2021    HDL 61 05/12/2021    LDLCHOLESTEROL 51 05/12/2021    ALT 11 04/30/2021    AST 17 04/30/2021     05/15/2021    K 4.2 05/15/2021     05/15/2021    CREATININE 0.76 05/15/2021    BUN 14 05/15/2021    CO2 26 05/15/2021    TSH 3.24 04/16/2021    INR 1.0 05/12/2021    LABA1C 5.4 05/12/2021     24 HOUR INTAKE/OUTPUT:    Intake/Output Summary (Last 24 hours) at 5/16/2021 5845  Last data filed at 5/15/2021 1700  Gross per 24 hour   Intake 600 ml   Output 245 ml   Net 355 ml       IMAGING:   IR Angiogram Carotid Cerebral Bilateral    Result Date: 5/15/2021  Date of Service: 5/15/2021 Diagnosis: Symptomatic severe left cervical ICA stenosis Patient arrived to the angio suite at: 8:00 AM Puncture obtained at: 8:40 AM Groin closure: 9:52 AM Procedure: 1. Transarterial left cervical carotid artery balloon angioplasty and carotid stenting for symptomatic severe stenosis with underlying a ulcerative plaque with distal embolic protection device 2. Selective left common carotid artery cerebral angiograms 3. Right common femoral artery angiogram 4. Moderate ascites sedation Vessels catheterized: 1. Left common carotid artery 2. Right common femoral artery Neurointerventionalist: Alena Wiley MD. [place]: Elena Richard MD Indication and Clinical History: Contrast: ?45 cc of Visipaque 270. Fluoroscopy time: 38.9 minutes Consent: After explaining the risks and benefits to the patient and the family including but not limited to stroke, death, visceral injury, dissection, tear, occlusion, x-ray dye allergic reaction, infection, consent form was obtained.  Anesthesia: MAC awake anesthesia Local Anesthesia with Lidocaine. Procedure and findings: The patient was brought to the interventional suite and placed in the supine position on the angio table. The right groin region was prepped, and cleaned in sterile fashion. The right common femoral artery was accessed using a micropuncture kit using Seldinger technique and 6 Portuguese x 90 cm shuttle sheath was placed. 70 units/kg of Heparin was administered for a target ACT of 250-300. A select catheter was advanced inside the 90cm sheath, under fluoroscopic guidance into the left common carotid artery and images were obtained in biplane projection of the cervical carotid. Baseline images of the left intracranial circulation were obtained in standard anterior and lateral projections. Of note the due to type III arch and bovine arch VTK catheter was used followed by SSim2 5 Western Nanci select catheter. Subsequently under fluoroscopic guidance The 6F long sheath was advanced over the catheter into the distal left common carotid artery under fluoroscopic guidance and the select catheter was removed. Findings: The left carotid artery injection demonstrated atherosclerotic changes in the cervical ICA resulting into 75 % stenosis at the cervical ICA origin per NASCET criteria due to underlying ulcerative plaque. Left Carotid Stent and Balloon Angioplasty: The stenotic segment was crossed using the Emboshield 9.6WW embolic protection device without difficulty. The Acculink 8 mm x40 mm stent was advanced and deployed successfully across the lesion. Post stent placement angioplasty was performed using 5 x20 mm Ant which was inflated to 10 atmospheres of pressure with good results and no significant residual stenosis. Final images were reviewed. The follow-up post procedure common carotid artery cerebral angiogram showed no evidence of distal embolization; with improved intracranial flow.  The 6 Western Nanci shuttle sheath was then repositioned within the right external iliac artery, and digital subtraction angiography of the right common femoral artery was performed in a frontal oblique projection. Arterial phase images were unremarkable, with no evidence of arterial stenosis, dissection, or pseudoaneurysm. The arterial puncture site was well placed above the femoral bifurcation and below the inguinal ?ligament. Therefore, the 6 Welsh sheath was removed, and adequate hemostasis was achieved using a 6F Angio-Seal followed by 10 minutes of manual compression. Disposition: the patient was transported out of the neuro interventional suite in stable and hemodynamic condition with no change in head neurological status. Complications: none. Impression: --Left proximal cervical ICA stenosis due to underlying ulcerative plaque resulting into 75% stenosis per NASCET criteria. --The above mentioned stenosis was successfully treated with Acculink 8mm x 40 mm open stent with distal embolic protection device and balloon angioplasty using Ant balloon 5 x 20 mm with no significant residual stenoses. This resulted in significant improvement of the underlying stenosis with less than 10% residual without hemodynamically significant. Dr. Nany Miller dictated this invasive procedure. Dr. Peggy Murrieta was present for all procedural and imaging components of this case. Examination was reviewed and reported findings confirmed and evaluated by Dr. Peggy Murrieta. MRI 5/12/2021: Acute/subacute 5 mm cortical infarction within the superior left temporal lobe,  -CTA head and neck 5/12/2021: Severe 80% stenosis at the origin of left cervical ICA, mild stenosis at the origin of left vertebral artery which statically arises from the arch  -EEG 5/6/2021: Within normal limits    Labs and Images reviewed with:  [] Dr. Curtis Orantes    [] Dr. Masoud Olguin  [x] Dr. Mitchel Lara  [] There are no new interval images to review. PHYSICAL EXAM       CONSTITUTIONAL:  Well developed, well nourished, alert and oriented x 3, in no acute distress.  GCS telemetry, will get cardiology to eval. Consider holter monitor on discharge. - on home dose norvasc 2.5mg, holding off as pressures on the lower side. - Continue telemetry    PULMONARY  - saturating fine on room air. RENAL/FLUID/ELECTROLYTE:    Lab Results   Component Value Date     05/15/2021    K 4.2 05/15/2021     05/15/2021    CO2 26 05/15/2021    BUN 14 05/15/2021    CREATININE 0.76 05/15/2021    GLUCOSE 92 05/15/2021    GLUCOSE 81 04/30/2021    CALCIUM 9.0 05/15/2021        - Replace electrolytes PRN  - Daily BMP    GI/NUTRITION:  NUTRITION:  DIET GENERAL;      Recent Labs     05/13/21  0614 05/12/21  1947 04/30/21  1545   WBC 6.4 8.5 9.18   HGB 11.9 12.7 15.2*   HCT 36.6 37.5 42.6   MCV 98.1 95.7 91.6    255 291         ID:  - Continue to monitor for fevers  - Daily CBC    HEME:     - Daily CBC    ENDOCRINE:  - Continue to monitor blood glucose, goal <180    OTHER:  - PT/OT/ST   - Code Status: full code    DVT PROPHYLAXIS:  - SCD sleeves - Thigh High   -Lovenox  -Aspirin  -Plavix    Disposition. Awaiting precert for [place].    Sonia Armendariz MD  5/16/2021     4:26 PM

## 2021-05-16 NOTE — PROGRESS NOTES
Daily Progress Note  Neuro Critical Care    Patient Name: Manolo Grey  Patient : 1935  Room/Bed: 4023/0034-91  Code Status: full code  Allergies: Allergies   Allergen Reactions    Percocet [Oxycodone-Acetaminophen]       Woozy    Hydrocodone        CHIEF COMPLAINT:        Syncope, memory loss     INTERVAL HISTORY    History Obtained From: patient, emr     The patient is a 82 y.o. female  past medical history including atrial fibrillation on Eliquis, hyperlipidemia, hypertension, hypothyroidism, admitted on 2021 with complaint of short-term memory difficulties, since she got COVID-19 infection on , she has had a syncopal event along with memory complaint to be admitted to [address] [place] [address] for 5 days, remote complaints have persisted, patient placed on Aricept to tolerate having nightmares     -MRI 2021: Acute/subacute 5 mm cortical infarction within the superior left temporal lobe,  -CTA head and neck 2021: Severe 80% stenosis at the origin of left cervical ICA, mild stenosis at the origin of left vertebral artery which statically arises from the arch  -EEG 2021: Within normal limits     Patient was loaded with aspirin, plavix and eliquis held for dsa today with Acculink stent placement in left ICA.      Admitted to ICU From: angio suite   Reason for ICU Admission: s/p carotid stent. Last 24h:      No acute events overnight. Maintaining sbp goals, but slightly on the lower side, bradycardiac. Walked with PT with walker, no balance issues. Feels at her baseline.         CURRENT MEDICATIONS:  SCHEDULED MEDICATIONS:   sodium chloride flush  5-40 mL Intravenous 2 times per day    clopidogrel  75 mg Oral Daily    docusate sodium  100 mg Oral BID    rosuvastatin  20 mg Oral Nightly    amLODIPine  2.5 mg Oral Daily    [Held by provider] apixaban  2.5 mg Oral BID    levothyroxine  75 mcg Oral Daily    lipase-protease-amylase  12,000 Units Oral TID WC    magnesium oxide  400 mg Oral BID    pantoprazole  40 mg Oral QAM AC    sodium chloride flush  5-40 mL Intravenous 2 times per day    aspirin  81 mg Oral Daily    Or    aspirin  300 mg Rectal Daily     CONTINUOUS INFUSIONS:   sodium chloride      sodium chloride      sodium chloride       PRN MEDICATIONS:   fentanNYL, sodium chloride flush, sodium chloride, midazolam, promethazine **OR** ondansetron, hydrALAZINE, acetaminophen, labetalol, sodium chloride flush, sodium chloride, promethazine **OR** ondansetron, polyethylene glycol    VITALS:  Temperature Range: Temp: 97.4 °F (36.3 °C) Temp  Av.6 °F (36.4 °C)  Min: 97.4 °F (36.3 °C)  Max: 97.8 °F (36.6 °C)  BP Range: Systolic (79VAZ), PAS:248 , Min:87 , CPI:525     Diastolic (83EJC), NMO:09, Min:28, Max:94    Pulse Range: Pulse  Av.3  Min: 40  Max: 79  Respiration Range: Resp  Av.7  Min: 0  Max: 46  Current Pulse Ox: SpO2: 96 %  24HR Pulse Ox Range: SpO2  Av.5 %  Min: 89 %  Max: 100 %  Patient Vitals for the past 12 hrs:   BP Temp Temp src Pulse Resp SpO2   21 0600 (!) 101/29 -- -- (!) 43 11 96 %   21 0500 (!) 128/49 -- -- (!) 46 21 96 %   21 0400 (!) 89/40 97.4 °F (36.3 °C) Oral (!) 44 14 96 %   21 0300 -- -- -- (!) 42 13 95 %   21 0200 -- -- -- 70 21 97 %   21 0100 -- -- -- (!) 46 17 96 %   21 0000 (!) 125/58 97.8 °F (36.6 °C) Oral 57 13 (!) 89 %   05/15/21 2300 (!) 88/28 -- -- (!) 41 12 96 %   05/15/21 2200 (!) 102/55 -- -- (!) 40 10 96 %   05/15/21 2100 (!) 146/56 -- -- (!) 42 13 96 %   05/15/21 2000 (!) 11653 97.5 °F (36.4 °C) Oral (!) 46 22 97 %   05/15/21 190 (!) 95/53 -- -- (!) 44 19 96 %     Estimated body mass index is 20.14 kg/m² as calculated from the following:    Height as of this encounter: 5' 5\" (1.651 m).     Weight as of this encounter: 121 lb (54.9 kg).  []<16 Severe malnutrition  []16-16.99 Moderate malnutrition  []17-18.49 Mild malnutrition  []18.5-24.9 Normal  []25-29.9 Overweight (not obese)  []30-34.9 Obese class 1 (Low Risk)  []35-39.9 Obese class 2 (Moderate Risk)  []?40 Obese class 3 (High Risk)    RECENT LABS:   Lab Results   Component Value Date    WBC 6.4 05/13/2021    HGB 11.9 05/13/2021    HCT 36.6 05/13/2021     05/13/2021    CHOL 123 05/12/2021    TRIG 55 05/12/2021    HDL 61 05/12/2021    LDLCHOLESTEROL 51 05/12/2021    ALT 11 04/30/2021    AST 17 04/30/2021     05/15/2021    K 4.2 05/15/2021     05/15/2021    CREATININE 0.76 05/15/2021    BUN 14 05/15/2021    CO2 26 05/15/2021    TSH 3.24 04/16/2021    INR 1.0 05/12/2021    LABA1C 5.4 05/12/2021     24 HOUR INTAKE/OUTPUT:    Intake/Output Summary (Last 24 hours) at 5/16/2021 7743  Last data filed at 5/15/2021 1700  Gross per 24 hour   Intake 600 ml   Output 245 ml   Net 355 ml       IMAGING:   IR Angiogram Carotid Cerebral Bilateral    Result Date: 5/15/2021  Date of Service: 5/15/2021 Diagnosis: Symptomatic severe left cervical ICA stenosis Patient arrived to the angio suite at: 8:00 AM Puncture obtained at: 8:40 AM Groin closure: 9:52 AM Procedure: 1. Transarterial left cervical carotid artery balloon angioplasty and carotid stenting for symptomatic severe stenosis with underlying a ulcerative plaque with distal embolic protection device 2. Selective left common carotid artery cerebral angiograms 3. Right common femoral artery angiogram 4. Moderate ascites sedation Vessels catheterized: 1. Left common carotid artery 2. Right common femoral artery Neurointerventionalist: Lv Helms MD. Overton: Shane Hernandez MD Indication and Clinical History: Contrast: ?45 cc of Visipaque 270. Fluoroscopy time: 38.9 minutes Consent: After explaining the risks and benefits to the patient and the family including but not limited to stroke, death, visceral injury, dissection, tear, occlusion, x-ray dye allergic reaction, infection, consent form was obtained. Anesthesia: MAC awake anesthesia Local Anesthesia with Lidocaine.  Procedure and findings: The patient was brought to the interventional suite and placed in the supine position on the angio table. The right groin region was prepped, and cleaned in sterile fashion. The right common femoral artery was accessed using a micropuncture kit using Seldinger technique and 6 Portuguese x 90 cm shuttle sheath was placed. 70 units/kg of Heparin was administered for a target ACT of 250-300. A select catheter was advanced inside the 90cm sheath, under fluoroscopic guidance into the left common carotid artery and images were obtained in biplane projection of the cervical carotid. Baseline images of the left intracranial circulation were obtained in standard anterior and lateral projections. Of note the due to type III arch and bovine arch VTK catheter was used followed by SSim2 5 Western Nanci select catheter. Subsequently under fluoroscopic guidance The 6F long sheath was advanced over the catheter into the distal left common carotid artery under fluoroscopic guidance and the select catheter was removed. Findings: The left carotid artery injection demonstrated atherosclerotic changes in the cervical ICA resulting into 75 % stenosis at the cervical ICA origin per NASCET criteria due to underlying ulcerative plaque. Left Carotid Stent and Balloon Angioplasty: The stenotic segment was crossed using the Emboshield 8.4OE embolic protection device without difficulty. The Acculink 8 mm x40 mm stent was advanced and deployed successfully across the lesion. Post stent placement angioplasty was performed using 5 x20 mm Ant which was inflated to 10 atmospheres of pressure with good results and no significant residual stenosis. Final images were reviewed. The follow-up post procedure common carotid artery cerebral angiogram showed no evidence of distal embolization; with improved intracranial flow.  The 6 Western Nanci shuttle sheath was then repositioned within the right external iliac artery, and digital subtraction angiography of the right common femoral artery was performed in a frontal oblique projection. Arterial phase images were unremarkable, with no evidence of arterial stenosis, dissection, or pseudoaneurysm. The arterial puncture site was well placed above the femoral bifurcation and below the inguinal ?ligament. Therefore, the 6 Tristanian sheath was removed, and adequate hemostasis was achieved using a 6F Angio-Seal followed by 10 minutes of manual compression. Disposition: the patient was transported out of the neuro interventional suite in stable and hemodynamic condition with no change in head neurological status. Complications: none. Impression: --Left proximal cervical ICA stenosis due to underlying ulcerative plaque resulting into 75% stenosis per NASCET criteria. --The above mentioned stenosis was successfully treated with Acculink 8mm x 40 mm open stent with distal embolic protection device and balloon angioplasty using Ant balloon 5 x 20 mm with no significant residual stenoses. This resulted in significant improvement of the underlying stenosis with less than 10% residual without hemodynamically significant. Dr. Chiara Black dictated this invasive procedure. Dr. Denisse Cassidy was present for all procedural and imaging components of this case. Examination was reviewed and reported findings confirmed and evaluated by Dr. Denisse Cassidy. MRI 5/12/2021: Acute/subacute 5 mm cortical infarction within the superior left temporal lobe,  -CTA head and neck 5/12/2021: Severe 80% stenosis at the origin of left cervical ICA, mild stenosis at the origin of left vertebral artery which statically arises from the arch  -EEG 5/6/2021: Within normal limits    Labs and Images reviewed with:  [] Dr. Shannon Meyer. Rolanda    [] Dr. Fauzia Key  [x] Dr. Shahla Kim  [] There are no new interval images to review. PHYSICAL EXAM       CONSTITUTIONAL:  Well developed, well nourished, alert and oriented x 3, in no acute distress. GCS 15. Nontoxic. No dysarthria. No aphasia. HEAD:  normocephalic, atraumatic    EYES:  PERRLA, EOMI.   ENT:  moist mucous membranes   NECK:  supple, symmetric   LUNGS:  Equal air entry bilaterally   CARDIOVASCULAR:  normal s1 / s2, RRR, distal pulses intact   ABDOMEN:  Soft, no rigidity   NEUROLOGIC:  Mental Status:  A & O x3,awake. 1/3 short term recall, world backwards 3/5             Cranial Nerves:    cranial nerves II-XII are grossly intact    Motor Exam:    Drift:  absent  Tone:  normal    Motor exam is symmetrical 5 out of 5 all extremities bilaterally    Sensory:    Touch:    Right Upper Extremity:  normal  Left Upper Extremity:  normal  Right Lower Extremity:  normal  Left Lower Extremity:  normal    Deep Tendon Reflexes:    Right Bicep:  2+  Left Bicep:  2+  Right Knee:  2+  Left Knee:  2+    Plantar Response:  Right:  downgoing  Left:  downgoing    Clonus:  absent  Miller's:  absent    Coordination/Dysmetria:  Heel to Shin:  Right:  normal  Left:  normal  Finger to Nose:   Right:  normal  Left:  normal   Dysdiadochokinesia:  absent       NIH Stroke Scale Total 0    DRAINS:  [] There are no drains for Neuro Critical Care to monitor at this time. ASSESSMENT AND PLAN:       This is a 82 y. o. female with  atrial fibrillation on Eliquis, hyperlipidemia, hypertension, hypothyroidism, admitted on 5/12/2021 with complaint of short-term memory difficulties,symptomatic left ICA stenosis, MRI 5/12/2021: Acute/subacute 5 mm cortical infarction within the  left frontal lobe, CTA head and neck 5/12/2021: Severe 80% stenosis at the origin of left cervical ICA, mild stenosis at the origin of left vertebral artery which statically arises from the arch     NEUROLOGIC:  - s/p left ica stent 5/15/2021    Neuro checks per protocol  - sbp goal .   - plan for aspirin and plavix for two weeks, then asa 81 and eliquis thereafter.   - outpatient neurology followup for ongoing memory issues.  Consider neuropsych eval.     CARDIOVASCULAR:      BP Range: Systolic (91CSN), ATD:512 , Min:87 , IIS:211     Diastolic (94BON), HCR:69, Min:28, Max:94    Pulse Range: Pulse  Av.3  Min: 40  Max: 79    - Goal SBP   - on home dose norvasc 2.5mg.  ivf at 125/hour.   - Continue telemetry    PULMONARY:    Respiration Range: Resp  Av.7  Min: 0  Max: 46  Current Pulse Ox: SpO2: 96 %    - saturating fine on room air. RENAL/FLUID/ELECTROLYTE:    Lab Results   Component Value Date     05/15/2021    K 4.2 05/15/2021     05/15/2021    CO2 26 05/15/2021    BUN 14 05/15/2021    CREATININE 0.76 05/15/2021    GLUCOSE 92 05/15/2021    GLUCOSE 81 2021    CALCIUM 9.0 05/15/2021          I/O last 3 completed shifts: In: 600 [I.V.:600]  Out: 245 [Urine:245]  No intake/output data recorded. - Replace electrolytes PRN  - Daily BMP    GI/NUTRITION:  NUTRITION:  DIET GENERAL;      Recent Labs     21  0614 21  1947 21  1545   WBC 6.4 8.5 9.18   HGB 11.9 12.7 15.2*   HCT 36.6 37.5 42.6   MCV 98.1 95.7 91.6    255 291         ID:  Temperature Range: Temp: 97.4 °F (36.3 °C) Temp  Av.6 °F (36.4 °C)  Min: 97.4 °F (36.3 °C)  Max: 97.8 °F (36.6 °C)    - Continue to monitor for fevers  - Daily CBC    HEME:     - Daily CBC    ENDOCRINE:  - Continue to monitor blood glucose, goal <180    OTHER:  - PT/OT/ST   - Code Status: full code    DVT PROPHYLAXIS:  - SCD sleeves - Thigh High   -Lovenox  -Aspirin  -Plavix      DISPOSITION:  [] To remain ICU:  [x] OK for out of ICU from Neuro Critical Care standpoint    Likely discharge today. For any changes in exam or patient status please contact Neuro Critical Care.       Stan Pelayo MD  Neuro Critical Care  2021     6:38 AM

## 2021-05-17 LAB
CULTURE: NORMAL
EKG ATRIAL RATE: 53 BPM
EKG P-R INTERVAL: 132 MS
EKG Q-T INTERVAL: 452 MS
EKG QRS DURATION: 80 MS
EKG QTC CALCULATION (BAZETT): 424 MS
EKG R AXIS: 35 DEGREES
EKG T AXIS: 33 DEGREES
EKG VENTRICULAR RATE: 53 BPM
Lab: NORMAL
SPECIMEN DESCRIPTION: NORMAL

## 2021-05-17 PROCEDURE — 6370000000 HC RX 637 (ALT 250 FOR IP): Performed by: STUDENT IN AN ORGANIZED HEALTH CARE EDUCATION/TRAINING PROGRAM

## 2021-05-17 PROCEDURE — 99232 SBSQ HOSP IP/OBS MODERATE 35: CPT | Performed by: PSYCHIATRY & NEUROLOGY

## 2021-05-17 PROCEDURE — 93010 ELECTROCARDIOGRAM REPORT: CPT | Performed by: INTERNAL MEDICINE

## 2021-05-17 PROCEDURE — 93005 ELECTROCARDIOGRAM TRACING: CPT | Performed by: INTERNAL MEDICINE

## 2021-05-17 PROCEDURE — 2060000000 HC ICU INTERMEDIATE R&B

## 2021-05-17 PROCEDURE — 2580000003 HC RX 258: Performed by: STUDENT IN AN ORGANIZED HEALTH CARE EDUCATION/TRAINING PROGRAM

## 2021-05-17 PROCEDURE — 97110 THERAPEUTIC EXERCISES: CPT

## 2021-05-17 PROCEDURE — 97116 GAIT TRAINING THERAPY: CPT

## 2021-05-17 PROCEDURE — 2580000003 HC RX 258: Performed by: ANESTHESIOLOGY

## 2021-05-17 RX ORDER — ERGOCALCIFEROL 1.25 MG/1
50000 CAPSULE ORAL WEEKLY
Status: DISCONTINUED | OUTPATIENT
Start: 2021-05-17 | End: 2021-05-18 | Stop reason: HOSPADM

## 2021-05-17 RX ADMIN — PANCRELIPASE 12000 UNITS: 30000; 6000; 19000 CAPSULE, DELAYED RELEASE PELLETS ORAL at 09:31

## 2021-05-17 RX ADMIN — SODIUM CHLORIDE, PRESERVATIVE FREE 10 ML: 5 INJECTION INTRAVENOUS at 09:33

## 2021-05-17 RX ADMIN — PANCRELIPASE 12000 UNITS: 30000; 6000; 19000 CAPSULE, DELAYED RELEASE PELLETS ORAL at 17:52

## 2021-05-17 RX ADMIN — ERGOCALCIFEROL 50000 UNITS: 1.25 CAPSULE ORAL at 10:42

## 2021-05-17 RX ADMIN — MAGNESIUM GLUCONATE 500 MG ORAL TABLET 400 MG: 500 TABLET ORAL at 21:29

## 2021-05-17 RX ADMIN — ROSUVASTATIN CALCIUM 20 MG: 20 TABLET, FILM COATED ORAL at 21:29

## 2021-05-17 RX ADMIN — AMLODIPINE BESYLATE 2.5 MG: 2.5 TABLET ORAL at 09:32

## 2021-05-17 RX ADMIN — PANTOPRAZOLE SODIUM 40 MG: 40 TABLET, DELAYED RELEASE ORAL at 09:30

## 2021-05-17 RX ADMIN — SODIUM CHLORIDE, PRESERVATIVE FREE 10 ML: 5 INJECTION INTRAVENOUS at 21:30

## 2021-05-17 RX ADMIN — Medication 81 MG: at 09:32

## 2021-05-17 RX ADMIN — DOCUSATE SODIUM 100 MG: 100 CAPSULE ORAL at 21:29

## 2021-05-17 RX ADMIN — MAGNESIUM GLUCONATE 500 MG ORAL TABLET 400 MG: 500 TABLET ORAL at 09:30

## 2021-05-17 RX ADMIN — SODIUM CHLORIDE, PRESERVATIVE FREE 10 ML: 5 INJECTION INTRAVENOUS at 21:29

## 2021-05-17 RX ADMIN — LEVOTHYROXINE SODIUM 75 MCG: 75 TABLET ORAL at 09:32

## 2021-05-17 RX ADMIN — DOCUSATE SODIUM 100 MG: 100 CAPSULE ORAL at 09:32

## 2021-05-17 RX ADMIN — CLOPIDOGREL 75 MG: 75 TABLET, FILM COATED ORAL at 09:32

## 2021-05-17 RX ADMIN — PANCRELIPASE 12000 UNITS: 30000; 6000; 19000 CAPSULE, DELAYED RELEASE PELLETS ORAL at 14:01

## 2021-05-17 ASSESSMENT — PAIN SCALES - GENERAL
PAINLEVEL_OUTOF10: 0

## 2021-05-17 NOTE — PROGRESS NOTES
Physical Therapy  Facility/Department: [address]  Daily Treatment Note  NAME: Nancye Schaumann  : 1935  MRN: [id number]    Date of Service: 2021    Discharge Recommendations:  Patient would benefit from continued therapy after discharge   PT Equipment Recommendations  Equipment Needed: No    Assessment   Body structures, Functions, Activity limitations: Decreased balance;Decreased endurance;Decreased functional mobility ; Decreased safe awareness  Assessment: The pt is able to amb house hold distance with rollator and CGA for safety, No LOB noted ,very impulsive and demo decrease safety awreness. She would continue to benefit from more PT to return to [facility]. Prognosis: Good  PT Education: Plan of Care;General Safety;Gait Training;Home Exercise Program  Patient Education: Pt ed on safety with ambution . REQUIRES PT FOLLOW UP: Yes  Activity Tolerance  Activity Tolerance: Patient Tolerated treatment well     Patient Diagnosis(es): The encounter diagnosis was Cerebrovascular accident (CVA), unspecified mechanism ([address] has a past medical history of Acid reflux, Atrial fibrillation ([address] Hyperlipidemia, Thyroid disease, TIA (transient ischemic attack), and Wears dentures. has a past surgical history that includes Hysterectomy; hernia repair (); and Bladder surgery. Restrictions  Restrictions/Precautions  Restrictions/Precautions: Up as Tolerated, Fall Risk  Required Braces or Orthoses?: No  Subjective   General  Chart Reviewed: Yes  Response To Previous Treatment: Patient with no complaints from previous session.   Family / Caregiver Present: No  Pain Screening  Patient Currently in Pain: Denies  Vital Signs  Patient Currently in Pain: Denies       Orientation  Orientation  Overall Orientation Status: Within Functional Limits  Cognition      Objective   Bed mobility  Bridging: Supervision  Supine to Sit: Supervision  Sit to Supine: Supervision  Scooting: Supervision  Comment: Cueing for safety required as pt is very impulsive and demo decrease safety awreness. Transfers  Sit to Stand: Contact guard assistance  Stand to sit: Contact guard assistance  Comment: transfer with RW. Cueing for hand placement and safety. Ambulation 1  Surface: level tile  Device: Rolling Walker  Assistance: Contact guard assistance  Distance: 300 ft  Comments: very impulsive and demo decrease safety awreness t/o     Balance  Posture: Good  Sitting - Static: Good  Standing - Static: Good  Standing - Dynamic: Good;-  Comments: Standing balance with [id number]. Exercises  Comments: .Standing exercise program: Heel/toe raises, hip flexion, hip abduction, mini squats, hip extension, and hamstring curls. Reps: x10 reps    Goals  Short term goals  Time Frame for Short term goals: 10 visits  Short term goal 1: Indpendent bed mobility  Short term goal 2: Independent transfers  Short term goal 3: Independent ambulation with 4WW 300 ft  Short term goal 4: pt to demonstrate good safety awareness with mobility.   Patient Goals   Patient goals : Go home    Plan    Plan  Times per week: 5-6 visits weekly  Times per day: Daily  Current Treatment Recommendations: Balance Training, Endurance Training, Gait Training, Strengthening, Safety Education & Training, Transfer Training, Patient/Caregiver Education & Training, Home Exercise Program, Functional Mobility Training, Neuromuscular Re-education  Safety Devices  Type of devices: Left in bed, Call light within reach, Nurse notified, Gait belt  Restraints  Initially in place: No     Therapy Time   Individual Concurrent Group Co-treatment   Time In 0954         Time Out 1018         Minutes 24         Timed Code Treatment Minutes: 24 Minutes       Nicko Elena, KESHA

## 2021-05-17 NOTE — CARE COORDINATION
Received a call from [address] [place] [address],[address]. Insurance is requesting a peer to Peer     PEER TO PEER INFORMATION: must be complete prior to 9 AM on 2021    [phone] opt [phone]  Name : aJcquelyn Broussard  : 68-  Ref Number: [id number]    Perfect serve with information was sent to Dr. Luis Gillespie to complete peer to peer.   Read at [phone] on

## 2021-05-17 NOTE — PROGRESS NOTES
[address] [facility] Neurology   [address] [place] [address]    Progress note             Date:   5/17/2021  Patient name:  Ángel Vasquez  Date of admission:  5/12/2021  7:03 PM  MRN:   [id number]  Account:  [de-identified]  YOB: 1935  PCP:    Sandrita Collins DO  Room:   [address] [place] [address]  Code Status:    Full Code    Chief Complaint:     Chief Complaint   Patient presents with    Other     sent by  for abnormal mri result       Interval hx: The Patient was seen and examined at bedside  Is vitally stable alert oriented x3  No acute events overnight  The patient stated that she is improving  S/p carotid stenting and balloon angioplasty the left cervical ICA on 5/15/2021. On aspirin 81 mg and Plavix 3 5 mg daily. Brief History of Present Illness: The patient is a 82 y.o. Non-/non  female who presents with Other (sent by  for abnormal mri result)   and she is admitted to the hospital for the management of mild cognitive impairment, abnormal MRI finding with left temporal embolic stroke. CTA with left severe ICA stenosis. S/p stenting. Past Medical History:     Past Medical History:   Diagnosis Date    Acid reflux     Atrial fibrillation ([address]     CARDIOLOGIST -  [address] [place] [address]    Hyperlipidemia     Thyroid disease     TIA (transient ischemic attack)     SPENT 3 WKS IN REHAB    Wears dentures     UPPER        Past Surgical History:     Past Surgical History:   Procedure Laterality Date    BLADDER SURGERY      suspension    HERNIA REPAIR  2012    x2    HYSTERECTOMY          Medications Prior to Admission:     Prior to Admission medications    Medication Sig Start Date End Date Taking?  Authorizing Provider   pantoprazole (PROTONIX) 40 MG tablet take 1 tablet by mouth once daily 4/27/21   Caridad Nova DO   Cholecalciferol (VITAMIN D3 PO) Take by mouth daily    Historical Provider, MD   Cyanocobalamin (VITAMIN B-12 PO) Take by mouth daily    Historical Provider, MD   simvastatin (ZOCOR) 10 MG tablet take 1 tablet by mouth every evening 4/14/21   Caridad Nova DO   levothyroxine (SYNTHROID) 75 MCG tablet take 1 tablet by mouth once daily 3/22/21   Caridad Nova DO   oxybutynin (DITROPAN XL) 10 MG extended release tablet Take 1 tablet by mouth daily  Patient not taking: Reported on 4/27/2021 2/25/21   Caridad Nova DO   hydrocortisone (PROCTO-MED HC) 2.5 % CREA rectal cream apply 1 applicatorful rectally four times a day if needed for HEMORRHOIDS 2/22/21   Caridad Nova DO   ELIQUIS 2.5 MG TABS tablet take 1 tablet by mouth twice a day 2/16/21   Cairdad Nova DO   apixaban (ELIQUIS) 2.5 MG TABS tablet Take 1 tablet by mouth 2 times daily 2/16/21   Caridad Nova DO   docusate sodium (COLACE) 100 MG capsule Take 100 mg by mouth 2 times daily    Historical Provider, MD   promethazine (PHENERGAN) 25 MG tablet take 1 tablet by mouth three times a day 30 MINUTES PRIOR TO MEALS for 10 days 9/29/20   Historical Provider, MD   nystatin (MYCOSTATIN) 177853 UNIT/GM cream Apply topically 2 times daily. 8/28/20   Caridad Nova DO   lipase-protease-amylase (CREON) 08087 units delayed release capsule Take 1 capsule by mouth 3 times daily (with meals) 8/6/20   Caridad Nova DO   Magnesium Oxide 250 MG TABS Take 400 mg by mouth 2 times daily     Historical Provider, MD        Allergies:     Percocet [oxycodone-acetaminophen] and Hydrocodone    Social History:     Tobacco:    reports that she has never smoked. She has never used smokeless tobacco.  Alcohol:      reports current alcohol use. Drug Use:  reports no history of drug use. Family History:     History reviewed. No pertinent family history.     Review of Systems:     ROS:  Constitutional  Negative for fever and chills    HEENT  Negative for ear discharge, ear pain, nosebleed    Eyes  Negative for photophobia, pain and discharge    Respiratory  Negative for hemoptysis and sputum    Cardiovascular  Negative for orthopnea, claudication and PND    Gastrointestinal  Negative for abdominal pain, diarrhea, blood in stool    Musculoskeletal  Negative for joint pain, negative for myalgia    Neurology Negative for seizures, loss of consciousness   Skin  Negative for rash or itching    Endo/heme/allergies  Negative for polydipsia, environmental allergy    Psychiatric/behavioral  Negative for suicidal ideation. Patient is not anxious        Physical Exam:   BP (!) 104/48   Pulse (!) 42   Temp 98.1 °F (36.7 °C)   Resp 13   Ht 5' 5\" (1.651 m)   Wt 128 lb 15.5 oz (58.5 kg)   SpO2 95%   BMI 21.46 kg/m²   Temp (24hrs), Av °F (36.7 °C), Min:97.6 °F (36.4 °C), Max:98.3 °F (36.8 °C)    No results for input(s): POCGLU in the last 72 hours. Intake/Output Summary (Last 24 hours) at 2021 0734  Last data filed at 2021 0500  Gross per 24 hour   Intake 720 ml   Output 900 ml   Net -180 ml         NEUROLOGIC EXAMINATION. MMSE 28, the patient was able to recall 1 out of 3 words. GENERAL  Appears comfortable and in no distress   HEENT  NC/ AT   NECK  Supple and no bruits heard   MENTAL STATUS:  Alert, oriented, intact memory, no confusion, normal speech, normal language. , no hallucination or delusion   CRANIAL NERVES: II     -      Visual fields intact to confrontation  III,IV,VI -  EOMs full, no afferent defect, no DORIE, no ptosis  V     -     Normal facial sensation  VII    -     Normal facial symmetry  VIII   -     Intact hearing  IX,X -     Symmetrical palate  XI    -     Symmetrical shoulder shrug  XII   -     Midline tongue, no atrophy    MOTOR FUNCTION:  significant for good strength of grade 5/5 in bilateral proximal and distal muscle groups of both upper and lower extremities with normal bulk, normal tone and no involuntary movements, no tremor   SENSORY FUNCTION:  Normal touch, normal pin, normal vibration, normal proprioception   CEREBELLAR FUNCTION:  Intact fine motor control over upper limbs   REFLEX FUNCTION:  Symmetric, CARDIOLOGY    Patient is admitted as inpatient status because of co-morbidities listed above, severity of signs and symptoms as outlined, requirement for current medical therapies and most importantly because of direct risk to patient if care not provided in a hospital setting.     Pennie Chavarria MD  5/17/2021  7:34 AM    Copy sent to Dr. Anju Lux DO

## 2021-05-17 NOTE — CONSULTS
Attestation signed by      Attending Physician Statement:    I have discussed the care of  Moraima Schmid , including pertinent history and exam findings, with the Cardiology fellow/resident. I have seen and examined the patient and the key elements of all parts of the encounter have been performed by me. I agree with the assessment, plan and orders as documented by the fellow/resident, after I modified exam findings and plan of treatments, and the final version is my approved version of the assessment. Additional Comments: The patient was seen and examined, agree with below. CVA, s/p Carotid stenting, Asymptomatic sinus bradycardia. Most likely worse after Carotid stenting. Will observe, patient is asymptomatic. Continue current medications, resume Eliquis when ok qith primary service. Holter and follow up as outpatient. [facility]    Consult / H&P               Today's Date: 5/17/2021  Patient Name: Moraima Schmid  Date of admission: 5/12/2021  7:03 PM  Patient's age: 82 y.o., 1935  Admission Dx: Stroke determined by clinical assessment ([facility] [address] [I63.9]    Reason for Consult:  Bradycardia    Requesting Physician: Annel Thurston MD    CHIEF COMPLAINT:  Speech difficulty     History Obtained From:  patient    HISTORY OF PRESENT ILLNESS:    82-year-old female with past medical history including atrial fibrillation on Eliquis, hyperlipidemia, hypertension, hypothyroidism. Patient presented after MRI brain revealing left MCA ischemic stroke. CTA head and neck with severe left cervical ICA stenosis. S/p successful left cervical ICA stent on 5/15. Cardiology is consulted for bradycardia on monitor and symptoms of dizziness. She is admitted for stroke. She complains of intermittent symptoms of dizziness in the morning in standing position. She denies any syncope.     Past Medical History:   has a past medical history of Acid reflux, Atrial fibrillation ([address] Hyperlipidemia, Thyroid disease, TIA (transient ischemic attack), and Wears dentures. Past Surgical History:   has a past surgical history that includes Hysterectomy; hernia repair (2012); and Bladder surgery. Home Medications:    Prior to Admission medications    Medication Sig Start Date End Date Taking? Authorizing Provider   pantoprazole (PROTONIX) 40 MG tablet take 1 tablet by mouth once daily 4/27/21   Caridad Nova DO   Cholecalciferol (VITAMIN D3 PO) Take by mouth daily    Historical Provider, MD   Cyanocobalamin (VITAMIN B-12 PO) Take by mouth daily    Historical Provider, MD   simvastatin (ZOCOR) 10 MG tablet take 1 tablet by mouth every evening 4/14/21   Caridad Nova DO   levothyroxine (SYNTHROID) 75 MCG tablet take 1 tablet by mouth once daily 3/22/21   Caridad Nova DO   oxybutynin (DITROPAN XL) 10 MG extended release tablet Take 1 tablet by mouth daily  Patient not taking: Reported on 4/27/2021 2/25/21   Caridad Nova DO   hydrocortisone (PROCTO-MED HC) 2.5 % CREA rectal cream apply 1 applicatorful rectally four times a day if needed for HEMORRHOIDS 2/22/21   Caridad Nova DO   ELIQUIS 2.5 MG TABS tablet take 1 tablet by mouth twice a day 2/16/21   Caridad Nova DO   apixaban (ELIQUIS) 2.5 MG TABS tablet Take 1 tablet by mouth 2 times daily 2/16/21   Caridad Nova DO   docusate sodium (COLACE) 100 MG capsule Take 100 mg by mouth 2 times daily    Historical Provider, MD   promethazine (PHENERGAN) 25 MG tablet take 1 tablet by mouth three times a day 30 MINUTES PRIOR TO MEALS for 10 days 9/29/20   Historical Provider, MD   nystatin (MYCOSTATIN) 622712 UNIT/GM cream Apply topically 2 times daily.  8/28/20   Caridad Nova DO   lipase-protease-amylase (CREON) 29465 units delayed release capsule Take 1 capsule by mouth 3 times daily (with meals) 8/6/20   Caridad Nova DO   Magnesium Oxide 250 MG TABS Take 400 mg by mouth 2 times daily     Historical Provider, MD      Current Facility-Administered Medications: vitamin D (ERGOCALCIFEROL) capsule 50,000 Units, 50,000 Units, Oral, Weekly  sodium chloride flush 0.9 % injection 5-40 mL, 5-40 mL, Intravenous, 2 times per day  sodium chloride flush 0.9 % injection 5-40 mL, 5-40 mL, Intravenous, PRN  0.9 % sodium chloride infusion, 25 mL, Intravenous, PRN  hydrALAZINE (APRESOLINE) injection 5 mg, 5 mg, Intravenous, Q4H PRN  acetaminophen (TYLENOL) tablet 650 mg, 650 mg, Oral, Q4H PRN  labetalol (NORMODYNE;TRANDATE) injection 10 mg, 10 mg, Intravenous, Q2H PRN  clopidogrel (PLAVIX) tablet 75 mg, 75 mg, Oral, Daily  docusate sodium (COLACE) capsule 100 mg, 100 mg, Oral, BID  rosuvastatin (CRESTOR) tablet 20 mg, 20 mg, Oral, Nightly  amLODIPine (NORVASC) tablet 2.5 mg, 2.5 mg, Oral, Daily  [Held by provider] apixaban (ELIQUIS) tablet 2.5 mg, 2.5 mg, Oral, BID  levothyroxine (SYNTHROID) tablet 75 mcg, 75 mcg, Oral, Daily  lipase-protease-amylase (CREON) delayed release capsule 12,000 Units, 12,000 Units, Oral, TID WC  magnesium oxide (MAG-OX) tablet 400 mg, 400 mg, Oral, BID  pantoprazole (PROTONIX) tablet 40 mg, 40 mg, Oral, QAM AC  sodium chloride flush 0.9 % injection 5-40 mL, 5-40 mL, Intravenous, 2 times per day  sodium chloride flush 0.9 % injection 5-40 mL, 5-40 mL, Intravenous, PRN  0.9 % sodium chloride infusion, 25 mL, Intravenous, PRN  promethazine (PHENERGAN) tablet 12.5 mg, 12.5 mg, Oral, Q6H PRN **OR** ondansetron (ZOFRAN) injection 4 mg, 4 mg, Intravenous, Q6H PRN  polyethylene glycol (GLYCOLAX) packet 17 g, 17 g, Oral, Daily PRN  aspirin EC tablet 81 mg, 81 mg, Oral, Daily **OR** aspirin suppository 300 mg, 300 mg, Rectal, Daily    Allergies:  Percocet [oxycodone-acetaminophen] and Hydrocodone    Social History:   reports that she has never smoked. She has never used smokeless tobacco. She reports current alcohol use. She reports that she does not use drugs. Family History: family history is not on file.  No h/o sudden cardiac tenderness  Bowel sounds present  Extremities:   No Cyanosis or Clubbing   Lower extremity edema: No   Skin: Warm and dry  Neurological:  Alert and oriented. Moves all extremities well  No abnormalities of mood, affect, memory, mentation, or behavior are noted    DATA:    Diagnostics:      EKG:   Sinus bradycardia    ECHO:   5/12  Left ventricle is normal in size Global left ventricular systolic function  is normal Estimated ejection fraction is 65 % . Moderate left ventricular hypertrophy.       Labs:     CBC: No results for input(s): WBC, HGB, HCT, PLT in the last 72 hours. BMP:   Recent Labs     05/15/21  0333      K 4.2   CO2 26   BUN 14   CREATININE 0.76   LABGLOM >60   GLUCOSE 92     BNP: No results for input(s): BNP in the last 72 hours. PT/INR: No results for input(s): PROTIME, INR in the last 72 hours. APTT:No results for input(s): APTT in the last 72 hours. CARDIAC ENZYMES:No results for input(s): CKTOTAL, CKMB, CKMBINDEX, TROPONINI in the last 72 hours. FASTING LIPID PANEL:  Lab Results   Component Value Date    HDL 61 05/12/2021    LDLCALC 88 08/13/2020    TRIG 55 05/12/2021     LIVER PROFILE:No results for input(s): AST, ALT, LABALBU in the last 72 hours. 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  (episodes noticed early in morning)  Episodes of dizziness at home are positional and unlikely to be related to bradycardia. Recommend Holter monitor at discharge for 14 day  Recommend to continue anticoagulation for paroxysmal Afib     Will discuss with rounding attending Dr. Dima Bedoya for final recommendations.     Jose Johns MD  Cardiology Fellow

## 2021-05-17 NOTE — PLAN OF CARE
Spoke with Dr. Jossy Weldon, cardiology via perfect serve and clarified the following. 48-hour holter monitor to start at discharge.

## 2021-05-17 NOTE — PLAN OF CARE
Problem: HEMODYNAMIC STATUS  Goal: Patient has stable vital signs and fluid balance  5/16/2021 2145 by Tyson Trujillo RN  Outcome: Ongoing     Problem: Falls - Risk of:  Goal: Will remain free from falls  Description: Will remain free from falls  5/16/2021 2145 by Tyson Trujillo RN  Outcome: Ongoing     Problem: Falls - Risk of:  Goal: Absence of physical injury  Description: Absence of physical injury  5/16/2021 2145 by Tyson Trujillo RN  Outcome: Ongoing     Problem: Skin Integrity:  Goal: Will show no infection signs and symptoms  Description: Will show no infection signs and symptoms  5/16/2021 2145 by Tyson Trujillo RN  Outcome: Ongoing     Problem: Skin Integrity:  Goal: Absence of new skin breakdown  Description: Absence of new skin breakdown  5/16/2021 2145 by Tyson Trujillo RN  Outcome: Ongoing

## 2021-05-17 NOTE — PROGRESS NOTES
ENDOVASCULAR NEUROSURGERY PROGRESS NOTE  2021 2:47 PM  Subjective:   No reported events overnight. Pt this am has no acute complaints. Objective:   Vitals: BP (!) 133/49   Pulse 51   Temp 97.3 °F (36.3 °C) (Oral)   Resp 14   Ht 5' 5\" (1.651 m)   Wt 128 lb 15.5 oz (58.5 kg)   SpO2 100%   BMI 21.46 kg/m²   General appearance: Lying in bed, NAD. HEENT: Atraumatic. Neck: Neck is supple. Lungs: No respiratory distress noted. Heart: normal sinus rhythm on tele. .   Abdomen: Soft nontender. Extremities: No lower limb edema noted. Neurologic:    Gen: awake, oriented x3  Lang: following simple commands appropriately, able to name simple objects, intact comprehension, no dysarthria. CN: EOMI, VFF, no facial droop   MOTOR: 5/5 all 4 ext. SENSORY: LT intact all 4 et. Coord: FTN intact    NIH Stroke Scale Total:  1a.  Level of consciousness:  0  1b. Level of consciousness commands:  0   1c. Level of consciousness questions:  0   2. Best Gaze:  0   3. Visual:  0   4. Facial Palsy:  0   5a. Motor left arm:  0  5b. Motor right arm:  0  6a. Motor left le  6b. Motor right le  7. Limb Ataxia:  0 - absent  8. Sensory:  0 - normal; no sensory loss  9. Best Language:  0  10. Dysarthria:  0  11. Extinction and Inattention: 0     Total: 0    imaging:   DSA 5/15/2021  --Left proximal cervical ICA stenosis due to underlying ulcerative plaque resulting into 75% stenosis per NASCET criteria. --The above mentioned stenosis was successfully treated with Acculink 8mm x 40 mm open stent with distal embolic protection device and balloon angioplasty using Ant balloon 5 x 20 mm with no significant residual stenoses. This resulted in    significant improvement of the underlying stenosis with less than 10% residual without hemodynamically significant. MRI brain wo con 2021      1. Acute/subacute 5 mm cortical infarct within the superior left temporal   lobe.    2. No acute intracranial hemorrhage. 3. Mild chronic small vessel ischemic changes. Assessment and Recommendations:   82-year-old female with past medical history of atrial fibrillation on Eliquis, hyperlipidemia, hypertension, hypothyroidism. Patient presented 5/15/2021 with acute left MCA ischemic stroke and severe left cervical ICA stenosis, s/p stent on 5/15/2021. PLAN:  --per neuro concern about repeat stroke in setting of active afib. Ok to restart eliquis 2.5 bid, monitor carefully  --Continue aspirin 81 and Plavix 75 for 2 weeks. Decrease to asa only in 2 weeks (5/29/2021)  --continue crestor 20  --Follow-up in neuro endovascular clinic at 2 weeks with Dr. Antoni Davis, Dr. Grace Block at 3 months with repeat CTA head and neck w con prior to 3mo visit. Case discussed with Dr. Grace Block attending.     Ken Cleary MD, MD  Stroke, [facility]  [address] [place]  Electronically signed 5/17/2021 at 2:47 PM

## 2021-05-17 NOTE — CARE COORDINATION
Jordan Lal from [facility] called to inform me that she has obtained the latest notes and will start procert

## 2021-05-17 NOTE — PROCEDURES
Please review your answers. If you need to make any changes, use the BACK button on your browser. Your answer to Question 1 - \"Do you have any symptoms that are bothering you? \" = YES    Your answer to Question 2 - \"Are you able to do the same work as before? \" = YES    Your answer to Question 3 - \"Are you able to keep up with your hobbies? \" = YES    Your answer to Question 4 - \"Have you maintained your ties to friends and family? \" = YES    Your answer to Question 5 - \"Do you need help making a simple meal, doing household chores, or balancing a checkbook? \" = YES    Your answer to Question 6 - \"Do you need help with shopping or traveling close to home? \" = YES    Your answer to Question 7 - \"Do you need another person to help you walk? \" = NO    Your answer to Question 8 - \"Do you need help with eating, going to the toilet, or bathing? \" = NO    Your answer to Question 9 - \"Do you stay in bed most of the day and need constant nursing care? \" = NO      The modified [place] Scale (mRS) is: 2    Potential conflicts are listed here (use the BACK button on your browser to fix errors): The moderate disability of mRS = 3 may conflict with the ability to do work described in Q#2. The moderate disability of mRS = 3 may conflict with the ability to keep up with hobbies described in Q#3. Levels of the modified [place] Scale (mRS):  0 - No symptoms. 1 - No significant disability. Able to carry out all usual activities, despite some symptoms. 2 - Slight disability. Able to look after own affairs without assistance, but unable to carry out all previous activities. 3 - Moderate disability. Requires some help, but able to walk unassisted. 4 - Moderately severe disability. Unable to attend to own bodily needs without assistance or unable to walk unassisted. 5 - Severe disability. Requires constant nursing care and attention, bedridden.

## 2021-05-18 VITALS
SYSTOLIC BLOOD PRESSURE: 135 MMHG | WEIGHT: 123.68 LBS | HEIGHT: 65 IN | HEART RATE: 53 BPM | OXYGEN SATURATION: 97 % | RESPIRATION RATE: 17 BRPM | DIASTOLIC BLOOD PRESSURE: 50 MMHG | TEMPERATURE: 97.6 F | BODY MASS INDEX: 20.61 KG/M2

## 2021-05-18 PROCEDURE — 2580000003 HC RX 258: Performed by: STUDENT IN AN ORGANIZED HEALTH CARE EDUCATION/TRAINING PROGRAM

## 2021-05-18 PROCEDURE — 99232 SBSQ HOSP IP/OBS MODERATE 35: CPT | Performed by: PSYCHIATRY & NEUROLOGY

## 2021-05-18 PROCEDURE — 6370000000 HC RX 637 (ALT 250 FOR IP): Performed by: STUDENT IN AN ORGANIZED HEALTH CARE EDUCATION/TRAINING PROGRAM

## 2021-05-18 PROCEDURE — 99238 HOSP IP/OBS DSCHRG MGMT 30/<: CPT | Performed by: PSYCHIATRY & NEUROLOGY

## 2021-05-18 RX ORDER — AMLODIPINE BESYLATE 2.5 MG/1
2.5 TABLET ORAL DAILY
Qty: 30 TABLET | Refills: 3 | Status: SHIPPED | OUTPATIENT
Start: 2021-05-19

## 2021-05-18 RX ORDER — ERGOCALCIFEROL 1.25 MG/1
50000 CAPSULE ORAL WEEKLY
Qty: 5 CAPSULE | Refills: 0 | Status: SHIPPED | OUTPATIENT
Start: 2021-05-24 | End: 2021-06-01 | Stop reason: SDUPTHER

## 2021-05-18 RX ORDER — CLOPIDOGREL BISULFATE 75 MG/1
75 TABLET ORAL DAILY
Qty: 15 TABLET | Refills: 0 | Status: SHIPPED | OUTPATIENT
Start: 2021-05-19 | End: 2021-07-14

## 2021-05-18 RX ORDER — ASPIRIN 81 MG/1
81 TABLET ORAL DAILY
Qty: 30 TABLET | Refills: 3 | Status: SHIPPED | OUTPATIENT
Start: 2021-05-19 | End: 2021-08-29

## 2021-05-18 RX ORDER — ROSUVASTATIN CALCIUM 20 MG/1
20 TABLET, COATED ORAL NIGHTLY
Qty: 30 TABLET | Refills: 3 | Status: SHIPPED | OUTPATIENT
Start: 2021-05-18 | End: 2021-08-30

## 2021-05-18 RX ADMIN — DOCUSATE SODIUM 100 MG: 100 CAPSULE ORAL at 08:19

## 2021-05-18 RX ADMIN — SODIUM CHLORIDE, PRESERVATIVE FREE 10 ML: 5 INJECTION INTRAVENOUS at 08:20

## 2021-05-18 RX ADMIN — PANTOPRAZOLE SODIUM 40 MG: 40 TABLET, DELAYED RELEASE ORAL at 08:19

## 2021-05-18 RX ADMIN — Medication 81 MG: at 08:19

## 2021-05-18 RX ADMIN — PANCRELIPASE 12000 UNITS: 30000; 6000; 19000 CAPSULE, DELAYED RELEASE PELLETS ORAL at 11:18

## 2021-05-18 RX ADMIN — PANCRELIPASE 12000 UNITS: 30000; 6000; 19000 CAPSULE, DELAYED RELEASE PELLETS ORAL at 08:19

## 2021-05-18 RX ADMIN — AMLODIPINE BESYLATE 2.5 MG: 2.5 TABLET ORAL at 08:19

## 2021-05-18 RX ADMIN — LEVOTHYROXINE SODIUM 75 MCG: 75 TABLET ORAL at 08:19

## 2021-05-18 RX ADMIN — PANCRELIPASE 12000 UNITS: 30000; 6000; 19000 CAPSULE, DELAYED RELEASE PELLETS ORAL at 16:53

## 2021-05-18 RX ADMIN — MAGNESIUM GLUCONATE 500 MG ORAL TABLET 400 MG: 500 TABLET ORAL at 08:20

## 2021-05-18 RX ADMIN — CLOPIDOGREL 75 MG: 75 TABLET, FILM COATED ORAL at 08:19

## 2021-05-18 ASSESSMENT — PAIN SCALES - GENERAL
PAINLEVEL_OUTOF10: 0
PAINLEVEL_OUTOF10: 0

## 2021-05-18 NOTE — DISCHARGE INSTR - COC
Continuity of Care Form    Patient Name: Jose Bonilla   :  1935  MRN:  [id number]    Admit date:  2021  Discharge date:  21    Code Status Order: Full Code   Advance Directives:   Advance Care Flowsheet Documentation       Date/Time Healthcare Directive Type of Healthcare Directive Copy in [address] Agent's Name Healthcare Agent's Phone Number    21 [phone]  No, patient does not have an advance directive for healthcare treatment -- -- -- -- --            Admitting Physician:  Tianna Cuevas MD  PCP: Sunny Montejo DO    Discharging Nurse: Trudy Mo [facility] Unit/Room#: 9705/2533-89  Discharging Unit Phone Number: [phone]    Emergency Contact:   Extended Emergency Contact Information  Primary Emergency Contact: [address] Phone: [phone]  Mobile Phone: [phone]  Relation: Child  Secondary Emergency Contact: Silvia Huerta [address] Phone: [phone]  Relation: Child    Past Surgical History:  Past Surgical History:   Procedure Laterality Date    BLADDER SURGERY      suspension    HERNIA REPAIR  2012    x2    HYSTERECTOMY         Immunization History:   Immunization History   Administered Date(s) Administered    Influenza, Quadv, adjuvanted, 65 yrs +, IM, PF (Fluad) 2021    Pneumococcal Polysaccharide ([id number]) 2021       Active Problems:  Patient Active Problem List   Diagnosis Code    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 [address] I63.9    Acute ischemic stroke ([facility] [address] I63.9    Cognitive impairment R41.89    Cerebrovascular accident (CVA) ([facility] [address] I63.9    Stenosis of right carotid artery I65.21    Status post angioplasty with stent Z95.820       Isolation/Infection:   Isolation            No Isolation          Patient Infection Status       None to display            Nurse Assessment:  Last Vital Signs: BP (!) 135/50   Pulse 53   Temp 97.6 °F (36.4 °C) (Oral)   Resp 17   Ht 5' 5\" (1.651 m)   Wt 123 lb 10.9 oz (56.1 kg)   SpO2 97%   BMI 20.58 kg/m²     Last documented pain score (0-10 scale): Pain Level: 0  Last Weight:   Wt Readings from Last 1 Encounters:   05/18/21 123 lb 10.9 oz (56.1 kg)     Mental Status:  oriented    IV Access:  - None    Nursing Mobility/ADLs:  Walking   Assisted  Transfer  Independent  Bathing  Assisted  Dressing  Assisted  Toileting  Assisted  Feeding  Independent  Med Admin  Independent  Med Delivery   whole    Wound Care Documentation and Therapy:        Elimination:  Continence:   · Bowel: Yes  · Bladder: Yes  Urinary Catheter: None   Colostomy/Ileostomy/Ileal Conduit: No       Date of Last BM: 5/18/21    Intake/Output Summary (Last 24 hours) at 5/18/2021 1359  Last data filed at 5/18/2021 0500  Gross per 24 hour   Intake 240 ml   Output 900 ml   Net -660 ml     I/O last 3 completed shifts: In: 600 [P.O.:600]  Out: 1050 [Urine:1050]    Safety Concerns: At Risk for Falls    Impairments/Disabilities:      None    Nutrition Therapy:  Current Nutrition Therapy:   - Oral Diet:  General    Routes of Feeding: Oral  Liquids: Thin Liquids  Daily Fluid Restriction: no  Last Modified Barium Swallow with Video (Video Swallowing Test): not done    Treatments at the Time of Hospital Discharge:   Respiratory Treatments: N/A  Oxygen Therapy:  is not on home oxygen therapy.   Ventilator:    - No ventilator support    Rehab Therapies: Physical Therapy and Occupational Therapy  Weight Bearing Status/Restrictions: No weight bearing restirctions  Other Medical Equipment (for information only, NOT a DME order):  walker and bath bench  Other Treatments: n/a      Patient's personal belongings (please select all that are sent with patient):  None    RN SIGNATURE:  Electronically signed by Paul Tucker RN on 5/18/21 at 3:19 PM EDT    CASE MANAGEMENT/SOCIAL WORK SECTION    Inpatient Status Date: ***    Readmission Risk Assessment Score:  Readmission Risk              Risk of Unplanned Readmission:  12           Discharging to Facility/ Agency   [facility]  [address],  [place] [address]       Phone: [phone]       Fax: [phone]        ·     Dialysis Facility (if applicable)   · Name:  · Address:  · Dialysis Schedule:  · Phone:  · Fax:    / signature: Electronically signed by Delice Castleman,, RN on 5/18/21 at 3:14 PM EDT    PHYSICIAN SECTION    Prognosis: Good    Condition at Discharge: Stable    Rehab Potential (if transferring to Rehab): Good    Recommended Labs or Other Treatments After Discharge: Follow-up with endovascular clinics in 2 weeks  Follow-up with neurology clinic in 1 month    Physician Certification: I certify the above information and transfer of Arik Perez  is necessary for the continuing treatment of the diagnosis listed and that she requires Home Care for greater 30 days.      Update Admission H&P: No change in H&P    PHYSICIAN SIGNATURE:  Electronically signed by Damaris Weinberg MD on 5/18/21 at 2:03 PM EDT

## 2021-05-18 NOTE — PROGRESS NOTES
ENDOVASCULAR NEUROSURGERY PROGRESS NOTE  2021 6:59 PM  Subjective:   No reported events overnight. Pt this am has no acute complaints. Objective:   Vitals: BP (!) 135/50   Pulse 53   Temp 97.6 °F (36.4 °C) (Oral)   Resp 17   Ht 5' 5\" (1.651 m)   Wt 123 lb 10.9 oz (56.1 kg)   SpO2 97%   BMI 20.58 kg/m²   General appearance: Lying in bed, NAD. HEENT: Atraumatic. Neck: Neck is supple. Lungs: No respiratory distress noted. Heart: normal sinus rhythm on tele. .   Abdomen: Soft nontender. Extremities: No lower limb edema noted. Neurologic:    Gen: awake, oriented x3  Lang: following simple commands appropriately, able to name simple objects, intact comprehension, no dysarthria. CN: EOMI, VFF, no facial droop   MOTOR: 5/5 all 4 ext. SENSORY: LT intact all 4 et. Coord: FTN intact    NIH Stroke Scale Total:  1a.  Level of consciousness:  0  1b. Level of consciousness commands:  0   1c. Level of consciousness questions:  0   2. Best Gaze:  0   3. Visual:  0   4. Facial Palsy:  0   5a. Motor left arm:  0  5b. Motor right arm:  0  6a. Motor left le  6b. Motor right le  7. Limb Ataxia:  0 - absent  8. Sensory:  0 - normal; no sensory loss  9. Best Language:  0  10. Dysarthria:  0  11. Extinction and Inattention: 0     Total: 0    imaging:   DSA 5/15/2021  --Left proximal cervical ICA stenosis due to underlying ulcerative plaque resulting into 75% stenosis per NASCET criteria. --The above mentioned stenosis was successfully treated with Acculink 8mm x 40 mm open stent with distal embolic protection device and balloon angioplasty using Ant balloon 5 x 20 mm with no significant residual stenoses. This resulted in    significant improvement of the underlying stenosis with less than 10% residual without hemodynamically significant. MRI brain wo con 2021      1. Acute/subacute 5 mm cortical infarct within the superior left temporal   lobe.    2. No acute intracranial hemorrhage. 3. Mild chronic small vessel ischemic changes. Assessment and Recommendations:   81-year-old female with past medical history of atrial fibrillation on Eliquis, hyperlipidemia, hypertension, hypothyroidism. Patient presented 5/15/2021 with acute left MCA ischemic stroke and severe left cervical ICA stenosis, s/p stent on 5/15/2021. PLAN:  --per neuro concern about repeat stroke in setting of active afib. Ok to restart eliquis 2.5 bid, monitor carefully  --Continue aspirin 81 and Plavix 75 for 2 weeks. Decrease to asa only in 2 weeks (5/29/2021)  --continue crestor 20  --Follow-up in neuro endovascular clinic at 2 weeks with Dr. Symone Rousseau, Dr. Rohan Huynh at 3 months with repeat CTA head and neck w con prior to 3mo visit. Case discussed with Dr. Rohan Huynh attending.     Fauzia Jerome MD, MD  Stroke, [facility]  [address]  Electronically signed 5/18/2021 at 6:59 PM

## 2021-05-18 NOTE — PROGRESS NOTES
[facility] Neurology   [facility]    Progress note             Date:   5/18/2021  Patient name:  Jose Bonilla  Date of admission:  5/12/2021  7:03 PM  MRN:   [id number]  Account:  [de-identified]  YOB: 1935  PCP:    Sunny Montejo DO  Room:   [address] [place] [address]  Code Status:    Full Code    Chief Complaint:     Chief Complaint   Patient presents with    Other     sent by  for abnormal mri result       Interval hx: The Patient was seen and examined at bedside  Is vitally stable alert oriented x3  No acute events overnight  The patient stated that she is improving  Was seen by cardiology, asymptomatic bradycardia. To observe. Pending placement. Ok to start [facility] with dual antipllt. Brief History of Present Illness: The patient is a 82 y.o. Non-/non  female who presents with Other (sent by  for abnormal mri result)   and she is admitted to the hospital for the management of mild cognitive impairment, abnormal MRI finding with left temporal embolic stroke. CTA with left severe ICA stenosis. S/p stenting. Past Medical History:     Past Medical History:   Diagnosis Date    Acid reflux     Atrial fibrillation ([facility] [address]     CARDIOLOGIST -  [address] [place] [address]    Hyperlipidemia     Thyroid disease     TIA (transient ischemic attack)     SPENT 3 WKS IN REHAB    Wears dentures     UPPER        Past Surgical History:     Past Surgical History:   Procedure Laterality Date    BLADDER SURGERY      suspension    HERNIA REPAIR  2012    x2    HYSTERECTOMY          Medications Prior to Admission:     Prior to Admission medications    Medication Sig Start Date End Date Taking?  Authorizing Provider   pantoprazole (PROTONIX) 40 MG tablet take 1 tablet by mouth once daily 4/27/21   Caridad Nova DO   Cholecalciferol (VITAMIN D3 PO) Take by mouth daily    Historical Provider, MD   Cyanocobalamin (VITAMIN B-12 PO) Take by mouth daily    Historical Provider, MD simvastatin (ZOCOR) 10 MG tablet take 1 tablet by mouth every evening 4/14/21   Caridad Nova DO   levothyroxine (SYNTHROID) 75 MCG tablet take 1 tablet by mouth once daily 3/22/21   Caridad Nova DO   oxybutynin (DITROPAN XL) 10 MG extended release tablet Take 1 tablet by mouth daily  Patient not taking: Reported on 4/27/2021 2/25/21   Caridad Nova DO   hydrocortisone (PROCTO-MED HC) 2.5 % CREA rectal cream apply 1 applicatorful rectally four times a day if needed for HEMORRHOIDS 2/22/21   Caridad Nova DO   ELIQUIS 2.5 MG TABS tablet take 1 tablet by mouth twice a day 2/16/21   Caridad Nova DO   apixaban (ELIQUIS) 2.5 MG TABS tablet Take 1 tablet by mouth 2 times daily 2/16/21   Caridad Nova DO   docusate sodium (COLACE) 100 MG capsule Take 100 mg by mouth 2 times daily    Historical Provider, MD   promethazine (PHENERGAN) 25 MG tablet take 1 tablet by mouth three times a day 30 MINUTES PRIOR TO MEALS for 10 days 9/29/20   Historical Provider, MD   nystatin (MYCOSTATIN) 126268 UNIT/GM cream Apply topically 2 times daily. 8/28/20   Caridad Nova DO   lipase-protease-amylase (CREON) 49152 units delayed release capsule Take 1 capsule by mouth 3 times daily (with meals) 8/6/20   Caridad Nova DO   Magnesium Oxide 250 MG TABS Take 400 mg by mouth 2 times daily     Historical Provider, MD        Allergies:     Percocet [oxycodone-acetaminophen] and Hydrocodone    Social History:     Tobacco:    reports that she has never smoked. She has never used smokeless tobacco.  Alcohol:      reports current alcohol use. Drug Use:  reports no history of drug use. Family History:     History reviewed. No pertinent family history.     Review of Systems:     ROS:  Constitutional  Negative for fever and chills    HEENT  Negative for ear discharge, ear pain, nosebleed    Eyes  Negative for photophobia, pain and discharge    Respiratory  Negative for hemoptysis and sputum    Cardiovascular  Negative for orthopnea, claudication and PND    Gastrointestinal  Negative for abdominal pain, diarrhea, blood in stool    Musculoskeletal  Negative for joint pain, negative for myalgia    Neurology Negative for seizures, loss of consciousness   Skin  Negative for rash or itching    Endo/heme/allergies  Negative for polydipsia, environmental allergy    Psychiatric/behavioral  Negative for suicidal ideation. Patient is not anxious        Physical Exam:   BP (!) 152/54   Pulse 55   Temp 98.1 °F (36.7 °C)   Resp 17   Ht 5' 5\" (1.651 m)   Wt 123 lb 10.9 oz (56.1 kg)   SpO2 94%   BMI 20.58 kg/m²   Temp (24hrs), Av.8 °F (36.6 °C), Min:96.8 °F (36 °C), Max:98.3 °F (36.8 °C)    No results for input(s): POCGLU in the last 72 hours. Intake/Output Summary (Last 24 hours) at 2021 0808  Last data filed at 2021 0500  Gross per 24 hour   Intake 600 ml   Output 900 ml   Net -300 ml         NEUROLOGIC EXAMINATION. MMSE 28, the patient was able to recall 1 out of 3 words. GENERAL  Appears comfortable and in no distress   HEENT  NC/ AT   NECK  Supple and no bruits heard   MENTAL STATUS:  Alert, oriented, intact memory, no confusion, normal speech, normal language. , no hallucination or delusion   CRANIAL NERVES: II     -      Visual fields intact to confrontation  III,IV,VI -  EOMs full, no afferent defect, no DORIE, no ptosis  V     -     Normal facial sensation  VII    -     Normal facial symmetry  VIII   -     Intact hearing  IX,X -     Symmetrical palate  XI    -     Symmetrical shoulder shrug  XII   -     Midline tongue, no atrophy    MOTOR FUNCTION:  significant for good strength of grade 5/5 in bilateral proximal and distal muscle groups of both upper and lower extremities with normal bulk, normal tone and no involuntary movements, no tremor   SENSORY FUNCTION:  Normal touch, normal pin, normal vibration, normal proprioception   CEREBELLAR FUNCTION:  Intact fine motor control over upper limbs   REFLEX FUNCTION:  Symmetric, no perverted reflex, no Babinski sign   STATION and GAIT  Not tested       Investigations:      Laboratory Testing:  No results found for this or any previous visit (from the past 24 hour(s)). Assessment :      Primary Problem  Mild cognitive impairment  Severe left ICA stenosis s/p stent and angioplasty. Active Hospital Problems    Diagnosis Date Noted    Stenosis of right carotid artery [I65.21]     Status post angioplasty with stent [Z95.820]     Acute ischemic stroke ([facility] [address] [I63.9]     Cognitive impairment [R41.89]     Cerebrovascular accident (CVA) ([facility] [address] [I63.9]     [facility] [I63.9] 05/12/2021       Plan:     31-year-old female patient with past medical history of atrial fibrillation on Eliquis, hypothyroidism, hyperlipidemia, hypertension, was admitted to the hospital due to short term memory issues. MRI with left superior temporal infarction looks embolic in origin, left ICA severe stenosis s/p stenting and angioplasty.    -Currently on dual antiplatelets. Endovascular okay to restart Eliquis 2.5 mg twice daily.  -Hemoglobin A1c 5.4, LDL 51. On Crestor 20 mg.  -Systolic pressure goal between 90 and 140.  -Endovascular neurology following.  -Basic blood work-up for dementia (vitamin B12 and folate, TSH, VDRL, EEG) were unremarkable except for mild vitamin D deficiency.   -The patient was seen by cardiology for history of syncope and sinus bradycardia, reported to be symptomatic bradycardia s/p stenting, to observe. Appreciate recommendations.  -The patient will be discharged today with home care. Follow-up further recommendations after discussing the case with attending  The plan was discussed with the patient, patient's family and the medical staff.    Consultations:   IP CONSULT TO STROKE TEAM  IP CONSULT TO CRITICAL CARE  IP CONSULT TO CARDIOLOGY    Patient is admitted as inpatient status because of co-morbidities listed above, severity of signs and symptoms as outlined, requirement for current medical therapies and most importantly because of direct risk to patient if care not provided in a hospital setting.     Minesh Clements MD  5/18/2021  8:08 AM    Copy sent to Dr. Leon Cool DO

## 2021-05-18 NOTE — PLAN OF CARE
Problem: HEMODYNAMIC STATUS  Goal: Patient has stable vital signs and fluid balance  5/18/2021 0946 by Devante Castillo RN  Outcome: Ongoing  5/17/2021 2240 by Christy Thapa RN  Outcome: Ongoing     Problem: Falls - Risk of:  Goal: Will remain free from falls  Description: Will remain free from falls  5/18/2021 0946 by Devante Castillo RN  Outcome: Ongoing  5/17/2021 2240 by Christy Thapa RN  Outcome: Ongoing  Goal: Absence of physical injury  Description: Absence of physical injury  5/18/2021 0946 by Devante Castillo RN  Outcome: Ongoing  5/17/2021 2240 by Christy Thapa RN  Outcome: Ongoing     Problem: Skin Integrity:  Goal: Will show no infection signs and symptoms  Description: Will show no infection signs and symptoms  5/18/2021 0946 by Devante Castillo RN  Outcome: Ongoing  5/17/2021 2240 by Christy Thapa RN  Outcome: Ongoing  Goal: Absence of new skin breakdown  Description: Absence of new skin breakdown  5/18/2021 0946 by Devante Castillo RN  Outcome: Ongoing  5/17/2021 2240 by Christy Thapa RN  Outcome: Ongoing     Problem: ABCDS Injury Assessment  Goal: Absence of physical injury  Outcome: Ongoing

## 2021-05-18 NOTE — CARE COORDINATION
Dr. Tiffany Partida calls to confirm number for peer to peer, he states that he will call now and get back to me on decision.

## 2021-05-18 NOTE — CARE COORDINATION
Discharge [address] [facility]  Clinical Case Management Department  Written by:  Sanchez RN    Patient Name: Stella Peres  Attending Provider: Alejandro Moss,*  Admit Date: 2021  7:03 PM  MRN: [id number]  Account: [de-identified]                     : 1935  Discharge Date: 21      Disposition: home with Inessa Collisn, ROXANNE

## 2021-05-18 NOTE — PROGRESS NOTES
After speaking with pt daughter Leanne Fritz reached out to Dr. Julienne La and case management in an attempt to determine if patient will be discharged today. Writer explained to daughter that there has not been a discharge order placed as of yet and we are waiting on the decision of the physician. Daughter requested I leave a message with the physician since I could not answer her questions. Awaiting response from  and Dr. Julienne La.

## 2021-05-18 NOTE — PLAN OF CARE
Problem: HEMODYNAMIC STATUS  Goal: Patient has stable vital signs and fluid balance  5/17/2021 2240 by Joseph Chen RN  Outcome: Ongoing     Problem: Falls - Risk of:  Goal: Will remain free from falls  Description: Will remain free from falls  5/17/2021 2240 by Joseph Chen RN  Outcome: Ongoing     Problem: Falls - Risk of:  Goal: Absence of physical injury  Description: Absence of physical injury  5/17/2021 2240 by Joseph Chen RN  Outcome: Ongoing     Problem: Skin Integrity:  Goal: Will show no infection signs and symptoms  Description: Will show no infection signs and symptoms  5/17/2021 2240 by Joseph Chen RN  Outcome: Ongoing     Problem: Skin Integrity:  Goal: Absence of new skin breakdown  Description: Absence of new skin breakdown  5/17/2021 2240 by Joseph Chen RN  Outcome: Ongoing

## 2021-05-19 NOTE — DISCHARGE SUMMARY
[facility]    Discharge Summary     Patient ID: Jeaneth Davies  :  1935   MRN: [id number]     ACCOUNT:  [de-identified]   Patient's PCP: Maira Seymour DO  Admit Date: 2021   Discharge Date: 2021    Length of Stay: 6  Code Status:  Prior  Admitting Physician: Lalo Cuello MD  Discharge Physician: Clarissa Herring MD     Active Discharge Diagnoses:       Primary Problem  Left temporal stroke    Hospital Problems  Active Hospital Problems    Diagnosis Date Noted    Stenosis of right carotid artery [I65.21]     Status post angioplasty with stent [Z95.820]     Acute ischemic stroke ([facility] [I63.9]     Cognitive impairment [R41.89]     Cerebrovascular accident (CVA) ([facility] [I63.9]     Stroke determined by clinical assessment ([facility] [I63.9] 2021       Admission Condition:  good     Discharged Condition: good    Hospital Stay:       Hospital Course: Jeaneth Davies is a 82 y.o. female who was admitted for the management of    <principal problem not specified> , presented to ER with Other (sent by dr for abnormal mri result)    The patient mentioned above with past medical history of A. fib on Eliquis 2.5 mg twice daily, hypothyroidism, hyperlipidemia, hypertension, chronic hyponatremia, was referred from Dr. Diana Mcfarland office for abnormal MRI. The patient was seen by Dr. Diana Mcfarland on 2021 for memory complaints. Family and the patient stated that she has short-term memory being affected in the last year. 1-1/2-year ago, the patient was completely independent and was driving car. After that, she started to have memory issues, and able to drive a car. In November last year, the patient was infected with COVID-19 and required 3 weeks of hospitalization and rehabilitation.   The family stated that after discharge, the patient memory was severely affected, the patient now is dependent on most of the daily activities, she Results   Component Value Date    TSH 3.24 04/16/2021       Radiology:    Echo Limited    Result Date: 5/13/2021  Transthoracic Echocardiography Report (TTE)  Patient Name SLEE        Date of Study             05/13/2021               Peace Urban   Date of      1935  Gender                    Female  Birth   Age          82 year(s)  Race                         Room Number  5927        Height:                   65 inch, 165.1 cm   Corporate ID [id number]    Weight:                   121 pounds, 54.9 kg  #   Patient Acct [de-identified]   BSA:         1.6 m^2      BMI:        20.14  #                                                              kg/m^2   MR #         [id number]     Sonographer               Misael Vasques   Accession #  [id number]  Interpreting Physician    Roberto Faith   Fellow                   Referring Nurse                           Practitioner   Interpreting             Referring Physician       Nayla Frye  Fellow                                             *  Type of Study   TTE procedure:2D Echocardiogram, M-Mode, Doppler, Color Doppler, Bubble  Study. Procedure Date Date: 05/13/2021 Start: 11:03 AM Study Location: [facility] Technical Quality: Fair visualization Indications:CVA. History / Tech. Comments: Procedure explained to patient. H/o COVID. Patient Status: Inpatient Height: 65 inches Weight: 121 pounds BSA: 1.6 m^2 BMI: 20.14 kg/m^2 CONCLUSIONS Summary Left ventricle is normal in size Global left ventricular systolic function is normal Estimated ejection fraction is 65 % . Moderate left ventricular hypertrophy.  Signature ----------------------------------------------------------------------------  Electronically signed by Julianna Brunner) on 05/13/2021 11:53  AM ---------------------------------------------------------------------------- ----------------------------------------------------------------------------  Electronically signed by Roberto Faith(Interpreting physician) on  2021 05:16 PM ---------------------------------------------------------------------------- FINDINGS Left Atrium Left atrium is normal in size. No evidence of patent foramen ovale by injection of agitated saline. Left Ventricle Left ventricle is normal in size Global left ventricular systolic function is normal Estimated ejection fraction is 65 % . Moderate left ventricular hypertrophy. Right Atrium Right atrium is normal in size. Right Ventricle Normal right ventricular size and function. Mitral Valve Thickened mitral valve leaflets. No mitral regurgitation. Aortic Valve Aortic valve is sclerotic but opens well. Aortic valve is trileaflet. No aortic insufficiency. Tricuspid Valve Normal tricuspid valve structure and function. Trivial tricuspid regurgitation. Pulmonic Valve The pulmonic valve is normal in structure. No pulmonic insufficiency. Pericardial Effusion No significant pericardial effusion is seen. Miscellaneous Normal aortic root dimension. E/E' average = 10.2. IVC normal diameter & inspiratory collapse indicating normal RA filling pressure .  M-mode / 2D Measurements & Calculations:   LVIDd:3.9 cm(3.7 - 5.6 cm)       Diastolic WEGFPC:19.6 ml  KFAIL:4.5 cm(2.2 - 4.0 cm)       Systolic JCEAND:27.1 ml  RFMZ:7.4 cm(0.6 - 1.1 cm)        Aortic Root:3 cm(2.0 - 3.7 cm)  LVPWd:1.2 cm(0.6 - 1.1 cm)       LA Dimension: 2.9 cm(1.9 - 4.0 cm)  Fractional Shortenin.03 %    LA volume/Index: 26.63 ml /17m^2  Calculated LVEF (%): 79.43 %                                   RVDd:2.5 cm   Mitral:                                    Aortic   Valve Area (P1/2-Time): 2.65 cm^2          Peak Velocity: 1.24 m/s  Peak E-Wave: 0.74 m/s                      Mean Velocity: 0.87 m/s  Peak A-Wave: 0.66 m/s                      Peak Gradient: 6.15 mmHg  E/A Ratio: 1.12                            Mean Gradient: 3 mmHg  Peak Gradient: 2.17 mmHg  Mean Gradient: 1 mmHg  Deceleration Time: 282 msec                AV VTI: 30.3 cm  P1/2t: 83 msec   Mean Velocity: 0.49 m/s   Tricuspid:                                 Pulmonic:   Peak TR Velocity: 2.11 m/s                 Peak Velocity: 0.71 m/s  Peak TR Gradient: 17.8084 mmHg             Peak Gradient: 1.99 mmHg  Diastology / Tissue Doppler Septal Wall E' velocity:0.07 m/s Septal Wall E/E':10.9 Lateral Wall E' velocity:0.08 m/s Lateral Wall E/E':9.5    CT HEAD WO CONTRAST    Result Date: 5/12/2021  EXAMINATION: CT OF THE HEAD WITHOUT CONTRAST  5/12/2021 8:45 pm TECHNIQUE: CT of the head was performed without the administration of intravenous contrast. Dose modulation, iterative reconstruction, and/or weight based adjustment of the mA/kV was utilized to reduce the radiation dose to as low as reasonably achievable. COMPARISON: MR brain from today HISTORY: ORDERING SYSTEM PROVIDED HISTORY: stroke consult, left temporal stroke on MRI TECHNOLOGIST PROVIDED HISTORY: stroke consult, left temporal stroke on MRI Reason for Exam: stroke consult,lt temporal stroke on mri Acuity: Unknown Type of Exam: Unknown FINDINGS: BRAIN/VENTRICLES: There is no acute intracranial hemorrhage, mass effect or midline shift. No abnormal extra-axial fluid collection. The gray-white differentiation is maintained without evidence of an acute infarct. There is no evidence of hydrocephalus. Idiopathic calcifications in the basal ganglia. Intracranial atherosclerosis. ORBITS: The visualized portion of the orbits demonstrate no acute abnormality. SINUSES: Air-fluid level right maxillary sinus. Polyp left maxillary sinus. SOFT TISSUES/SKULL:  No acute abnormality of the visualized skull or soft tissues. Idiopathic basal ganglia calcifications. Sinusitis. Otherwise no acute disease.      IR Angiogram Carotid Cerebral Bilateral    Result Date: 5/15/2021  Date of Service: 5/15/2021 Diagnosis: Symptomatic severe left cervical ICA stenosis Patient arrived to the angio suite at: 8:00 AM Puncture obtained at: 8:40 AM Groin closure: 9:52 AM Procedure: 1. Transarterial left cervical carotid artery balloon angioplasty and carotid stenting for symptomatic severe stenosis with underlying a ulcerative plaque with distal embolic protection device 2. Selective left common carotid artery cerebral angiograms 3. Right common femoral artery angiogram 4. Moderate ascites sedation Vessels catheterized: 1. Left common carotid artery 2. Right common femoral artery Neurointerventionalist: Georgia Perez MD. [place]: Anisha Goldsmith MD Indication and Clinical History: Contrast: ?45 cc of Visipaque 270. Fluoroscopy time: 38.9 minutes Consent: After explaining the risks and benefits to the patient and the family including but not limited to stroke, death, visceral injury, dissection, tear, occlusion, x-ray dye allergic reaction, infection, consent form was obtained. Anesthesia: MAC awake anesthesia Local Anesthesia with Lidocaine. Procedure and findings: The patient was brought to the interventional suite and placed in the supine position on the angio table. The right groin region was prepped, and cleaned in sterile fashion. The right common femoral artery was accessed using a micropuncture kit using Seldinger technique and 6 [place] x 90 cm shuttle sheath was placed. 70 units/kg of Heparin was administered for a target ACT of 250-300. A select catheter was advanced inside the 90cm sheath, under fluoroscopic guidance into the left common carotid artery and images were obtained in biplane projection of the cervical carotid. Baseline images of the left intracranial circulation were obtained in standard anterior and lateral projections. Of note the due to type III arch and bovine arch VTK catheter was used followed by SSim2 5 Western Nanci select catheter.  Subsequently under fluoroscopic guidance The 6F long sheath was advanced over the catheter into the distal left common carotid artery under fluoroscopic guidance and the select catheter was removed. Findings: The left carotid artery injection demonstrated atherosclerotic changes in the cervical ICA resulting into 75 % stenosis at the cervical ICA origin per NASCET criteria due to underlying ulcerative plaque. Left Carotid Stent and Balloon Angioplasty: The stenotic segment was crossed using the Emboshield 0.0CO embolic protection device without difficulty. The Acculink 8 mm x40 mm stent was advanced and deployed successfully across the lesion. Post stent placement angioplasty was performed using 5 x20 mm Ant which was inflated to 10 atmospheres of pressure with good results and no significant residual stenosis. Final images were reviewed. The follow-up post procedure common carotid artery cerebral angiogram showed no evidence of distal embolization; with improved intracranial flow. The 6 Western Nanci shuttle sheath was then repositioned within the right external iliac artery, and digital subtraction angiography of the right common femoral artery was performed in a frontal oblique projection. Arterial phase images were unremarkable, with no evidence of arterial stenosis, dissection, or pseudoaneurysm. The arterial puncture site was well placed above the femoral bifurcation and below the inguinal ?ligament. Therefore, the 6 Micronesian sheath was removed, and adequate hemostasis was achieved using a 6F Angio-Seal followed by 10 minutes of manual compression. Disposition: the patient was transported out of the neuro interventional suite in stable and hemodynamic condition with no change in head neurological status. Complications: none. Impression: --Left proximal cervical ICA stenosis due to underlying ulcerative plaque resulting into 75% stenosis per NASCET criteria. --The above mentioned stenosis was successfully treated with Acculink 8mm x 40 mm open stent with distal embolic protection device and balloon angioplasty using Ant balloon 5 x 20 mm with no significant residual stenoses.  This resulted in significant improvement of the underlying stenosis with less than 10% residual without hemodynamically significant. Dr. Suresh Craven dictated this invasive procedure. Dr. Teresita Oviedo was present for all procedural and imaging components of this case. Examination was reviewed and reported findings confirmed and evaluated by Dr. Teresita Oviedo. XR CHEST PORTABLE    Result Date: 2021  Evaluate for Atelectasis [facility] Department of Radiology [address], [place] [phone]    ========================================================================== Patient Name: Jenise Chirinos : 1935 Sex: F Age: Pedro Epstein MRN: [id number] Pt. Location: [facility] Patient Status: E Visit #: [id number] Ordered Date: 2021 4:00:00 PM Completed Date: 2021 04:29 PM Requesting Provider: Mary Lemos Attending Provider: Zeyad Glass Report Copy To: Signs ^ Symptoms: Shortness of Breath History: Comments: Evaluate for Atelectasis Exam: PORTABLE CHEST 1 VIEW Accession #: [id number] ========================================================================== PORTABLE CHEST 1 VIEW  2021 4:29 PM  CLINICAL INDICATIONS: Shortness of Breath  TECHNOLOGIST COMMENTS: Shortness of breath  QUESTION FOR THE RADIOLOGIST: Evaluate for Atelectasis  PROTOCOL: AP(PA) view was obtained. COMPARISON: December 15, 2020  FINDINGS: Upper mediastinum unremarkable. Hilar regions normal. Heart normal size. Right lung is clear. Left lung is clear. Bony thorax unremarkable. IMPRESSION: No acute cardiopulmonary abnormality or interval change. Electronically signed: Virgen Rodriguez. Transcribed by:  Butch, User Resident: Electronically Signed by: John Grajeda @ 2021 04:39 PM    CTA HEAD NECK W CONTRAST    Result Date: 2021  EXAMINATION: CTA OF THE HEAD AND NECK WITH CONTRAST 2021 8:36 pm: TECHNIQUE: CTA of the head and neck was performed with the administration of intravenous contrast. Multiplanar reformatted images are provided for review. MIP images are provided for review. Stenosis of the internal carotid arteries measured using NASCET criteria. Dose modulation, iterative reconstruction, and/or weight based adjustment of the mA/kV was utilized to reduce the radiation dose to as low as reasonably achievable. COMPARISON: None. HISTORY: ORDERING SYSTEM PROVIDED HISTORY: L temporal stroke. TECHNOLOGIST PROVIDED HISTORY: L temporal stroke. Decision Support Exception - unselect if not a suspected or confirmed emergency medical condition->Emergency Medical Condition (MA) Reason for Exam: stroke consult,lt infarct seen on mri Acuity: Unknown Type of Exam: Unknown FINDINGS: CTA NECK: AORTIC ARCH/ARCH VESSELS: Left vertebral artery directly arises from the aortic arch, anatomical variation. Mild atherosclerotic disease at the origin of left vertebral artery from the arch. No dissection or arterial injury. No significant stenosis of the brachiocephalic or subclavian arteries. CAROTID ARTERIES: There is moderate atherosclerotic calcification of left carotid bulb extending into the origin of left internal carotid artery resulting in severe stenosis at the origin of left cervical ICA with residual lumen measuring 2 mm, consistent with 80% stenosis. Mild atherosclerotic disease of right carotid bulb extending into the origin of right internal carotid artery not resulting in hemodynamically significant stenosis. VERTEBRAL ARTERIES: No dissection, arterial injury, or significant stenosis. SOFT TISSUES: The lung apices are clear. No cervical or superior mediastinal lymphadenopathy. The larynx and pharynx are unremarkable. No acute abnormality of the salivary and thyroid glands. BONES: No acute osseous abnormality. 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 No significant stenosis of the intracranial internal carotid, anterior cerebral, or middle cerebral arteries.  No aneurysm. POSTERIOR CIRCULATION: No significant stenosis of the vertebral, basilar, or posterior cerebral arteries. No aneurysm. 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 OTHER: No dural venous sinus thrombosis on this non-dedicated study. BRAIN: No mass effect or midline shift. No extra-axial fluid collection. The gray-white differentiation is maintained. Moderate mucosal thickening of maxillary sinuses. Severe 80% stenosis at the origin of left cervical ICA. Mild stenosis at the origin of left vertebral artery which directly arises from the arch. Unremarkable CTA of the head with anatomical variation as described. ================================== CAROTID STENOSIS REFERENCE: The Edmundo American Symptomatic Carotid Endarterectomy Trial (NASCET) is a method of quantifying internal carotid artery stenosis. Distal internal carotid artery diameter as the denominator for stenosis measurement: MILD = <50% stenosis. MODERATE =  50-69% stenosis. SEVERE =  70-89% stenosis. HAIRLINE/CRITICAL = 90-99% stenosis. OCCLUDED = 100% stenosis. ==================================     MRI BRAIN WO CONTRAST    Result Date: 5/12/2021  EXAMINATION: MRI OF THE BRAIN WITHOUT CONTRAST  5/12/2021 2:02 pm TECHNIQUE: Multiplanar multisequence MRI of the brain was performed without the administration of intravenous contrast. COMPARISON: None. HISTORY: ORDERING SYSTEM PROVIDED HISTORY: 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 TECHNOLOGIST PROVIDED HISTORY: Reason for Exam: patient states dizziness and nausea x1 month FINDINGS: INTRACRANIAL STRUCTURES/VENTRICLES: There is a 5 mm focus of restricted diffusion within the cortex of the superior left temporal lobe consistent with an acute/subacute infarct. There is corresponding abnormal increased T2/FLAIR signal intensity. There is no acute intracranial hemorrhage. No mass effect or midline shift is present.  There are multiple foci of abnormal increased T2/FLAIR signal intensity within the periventricular and deep white matter of both hemispheres. The brain is otherwise of normal signal intensities. There is no sellar or suprasellar mass present. There is no ventriculomegaly or abnormal extra-axial fluid collection present. The proximal portions of the [place] demonstrate normal flow voids. ORBITS: Limited evaluation of the orbits is unremarkable. SINUSES: There is mild mucosal thickening in the maxillary sinuses. The paranasal sinuses and mastoid air cells are otherwise clear. No fluid levels are identified. BONES/SOFT TISSUES: Bone marrow signal intensity is normal.     1. Acute/subacute 5 mm cortical infarct within the superior left temporal lobe. 2. No acute intracranial hemorrhage. 3. Mild chronic small vessel ischemic changes. The findings were sent to the Radiology Results [address] at 3:24 pm on 5/12/2021to be communicated to a licensed caregiver. Consultations:    Consults:     Final Specialist Recommendations/Findings:   IP CONSULT TO STROKE TEAM  IP CONSULT TO CRITICAL CARE  IP CONSULT TO CARDIOLOGY  IP CONSULT TO HOME CARE NEEDS      The patient was seen and examined on day of discharge and this discharge summary is in conjunction with any daily progress note from day of discharge. Discharge plan:       Disposition: Home  With home care  Physician Follow Up:     Cate Layne MD  [facility] [address] [place] [address]  [phone]    In 1 month      Kaz Andre, [address] [facility] [place] [facility]  [phone]    In 2 weeks      Brody Adhikari DO  [facility] [address] [place] [address]  [phone]    In 1 week         Requiring Further Evaluation/Follow Up POST HOSPITALIZATION/Incidental Findings:     Diet: cardiac diet    Activity: As tolerated    Instructions to Patient:   1. Return to hospital if symptoms worsen  2. Cont home meds  3.  F/u with PCP in a week  4. F/u with neurology in 4 weeks and endovascular neurology in 2 weeks. 5. Start aspirin Plavix for 2 weeks then aspirin alone. Continue on Eliquis 2.5 mg twice daily  6. Continue on taking Crestor 20 mg daily. Restrictions: Driving No, Swimming No, Operating heavy machinery No, Compromising heights No      Discharge Medications:      Medication List      START taking these medications    amLODIPine 2.5 MG tablet  Commonly known as: NORVASC  Take 1 tablet by mouth daily     aspirin 81 MG EC tablet  Take 1 tablet by mouth daily     clopidogrel 75 MG tablet  Commonly known as: PLAVIX  Take 1 tablet by mouth daily     rosuvastatin 20 MG tablet  Commonly known as: CRESTOR  Take 1 tablet by mouth nightly     vitamin D 1.25 MG (29369 UT) Caps capsule  Commonly known as: ERGOCALCIFEROL  Take 1 capsule by mouth once a week  Start taking on: May 24, 2021        CHANGE how you take these medications    apixaban 2.5 MG Tabs tablet  Commonly known as: Eliquis  Take 1 tablet by mouth 2 times daily  What changed: Another medication with the same name was removed. Continue taking this medication, and follow the directions you see here. CONTINUE taking these medications    Creon 52993 units delayed release capsule  Generic drug: lipase-protease-amylase  Take 1 capsule by mouth 3 times daily (with meals)     docusate sodium 100 MG capsule  Commonly known as: COLACE     hydrocortisone 2.5 % Crea rectal cream  Commonly known as: Procto-Med HC  apply 1 applicatorful rectally four times a day if needed for HEMORRHOIDS     levothyroxine 75 MCG tablet  Commonly known as: SYNTHROID  take 1 tablet by mouth once daily     Magnesium Oxide 250 MG Tabs tablet  Commonly known as: MAGNESIUM-OXIDE     nystatin 069274 UNIT/GM cream  Commonly known as: MYCOSTATIN  Apply topically 2 times daily.      oxybutynin 10 MG extended release tablet  Commonly known as: Ditropan XL  Take 1 tablet by mouth daily     pantoprazole 40 MG tablet  Commonly known as: PROTONIX  take 1 tablet by mouth once daily     promethazine 25 MG tablet  Commonly known as: PHENERGAN     simvastatin 10 MG tablet  Commonly known as: ZOCOR  take 1 tablet by mouth every evening     VITAMIN B-12 PO     VITAMIN D3 PO           Where to Get Your Medications      These medications were sent to [address], [place] [address]    Phone: [phone]   amLODIPine 2.5 MG tablet  apixaban 2.5 MG Tabs tablet  aspirin 81 MG EC tablet  clopidogrel 75 MG tablet  rosuvastatin 20 MG tablet  vitamin D 1.25 MG (67157 UT) Caps capsule         Time Spent on discharge is  40 mins in patient examination, evaluation, counseling as well as medication reconciliation, prescriptions for required medications, discharge plan and follow up. Discharge process and orders were discussed with attending Dr. Lou Alvarez  Electronically signed by   Lenin Bansal MD  5/19/2021  8:53 AM      Thank you Dr. Bala Chappell DO for the opportunity to be involved in this patient's care.

## 2021-05-26 ENCOUNTER — TELEPHONE (OUTPATIENT)
Dept: NEUROLOGY | Age: 86
End: 2021-05-26

## 2021-05-26 NOTE — TELEPHONE ENCOUNTER
Pt daughter would like to discuss her mom medication she states that she was not given a follow up with you after her hsopitsl discharge (appointment was made today for her). She has questions regarding her Plaxix, and Crestor.

## 2021-05-26 NOTE — TELEPHONE ENCOUNTER
I tried to call the pt, no answer, I left a voicemail. Please attach the daughter's phone number.   Thank you

## 2021-05-28 NOTE — TELEPHONE ENCOUNTER
Ida Hopes, I called this phone number 3 times with no answer. Not sure if there is different number we can use.  Thanks

## 2021-06-01 ENCOUNTER — OFFICE VISIT (OUTPATIENT)
Dept: FAMILY MEDICINE CLINIC | Age: 86
End: 2021-06-01
Payer: MEDICARE

## 2021-06-01 VITALS
DIASTOLIC BLOOD PRESSURE: 74 MMHG | HEART RATE: 60 BPM | SYSTOLIC BLOOD PRESSURE: 125 MMHG | HEIGHT: 65 IN | BODY MASS INDEX: 20.93 KG/M2 | OXYGEN SATURATION: 98 % | WEIGHT: 125.6 LBS

## 2021-06-01 DIAGNOSIS — I63.9 ACUTE ISCHEMIC STROKE (HCC): ICD-10-CM

## 2021-06-01 DIAGNOSIS — R41.89 COGNITIVE IMPAIRMENT: ICD-10-CM

## 2021-06-01 DIAGNOSIS — I65.21 STENOSIS OF RIGHT CAROTID ARTERY: ICD-10-CM

## 2021-06-01 DIAGNOSIS — Z09 HOSPITAL DISCHARGE FOLLOW-UP: Primary | ICD-10-CM

## 2021-06-01 DIAGNOSIS — Z95.820 STATUS POST ANGIOPLASTY WITH STENT: ICD-10-CM

## 2021-06-01 PROCEDURE — 1111F DSCHRG MED/CURRENT MED MERGE: CPT | Performed by: FAMILY MEDICINE

## 2021-06-01 PROCEDURE — 99214 OFFICE O/P EST MOD 30 MIN: CPT | Performed by: FAMILY MEDICINE

## 2021-06-01 RX ORDER — ERGOCALCIFEROL 1.25 MG/1
50000 CAPSULE ORAL WEEKLY
Qty: 12 CAPSULE | Refills: 3 | Status: SHIPPED | OUTPATIENT
Start: 2021-06-01

## 2021-06-01 ASSESSMENT — PATIENT HEALTH QUESTIONNAIRE - PHQ9
1. LITTLE INTEREST OR PLEASURE IN DOING THINGS: 0
SUM OF ALL RESPONSES TO PHQ QUESTIONS 1-9: 0
2. FEELING DOWN, DEPRESSED OR HOPELESS: 0
SUM OF ALL RESPONSES TO PHQ QUESTIONS 1-9: 0
SUM OF ALL RESPONSES TO PHQ9 QUESTIONS 1 & 2: 0
SUM OF ALL RESPONSES TO PHQ QUESTIONS 1-9: 0

## 2021-06-01 NOTE — PROGRESS NOTES
Post-Discharge Transitional Care Management Services or Hospital Follow Up      Moraima Schmid   YOB: 1935    Date of Office Visit:  6/1/2021  Date of Hospital Admission: 5/12/21  Date of Hospital Discharge: 5/18/21  Risk of hospital readmission (high >=14%.  Medium >=10%) :Readmission Risk Score: 12      Care management risk score Rising risk (score 2-5) and Complex Care (Scores >=6): 0     Non face to face  following discharge, date last encounter closed (first attempt may have been earlier): *No documented post hospital discharge outreach found in the last 14 days    Call initiated 2 business days of discharge: *No response recorded in the last 14 days    Patient Active Problem List   Diagnosis    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    [facility]roke determined by [facility] assessment ([facility]    Acute ischemic stroke ([facility]    Cognitive impairment    Cerebrovascular accident (CVA) ([facility]    Stenosis of right carotid artery    Status post angioplasty with stent       Allergies   Allergen Reactions    Percocet [Oxycodone-Acetaminophen]       Woozy    Hydrocodone        Medications listed as ordered at the time of discharge from hospital   Ermelinda Barakat Medication Instructions ANIL:    Printed on:06/01/21 7074   Medication Information                      amLODIPine (NORVASC) 2.5 MG tablet  Take 1 tablet by mouth daily             apixaban (ELIQUIS) 2.5 MG TABS tablet  Take 1 tablet by mouth 2 times daily             aspirin 81 MG EC tablet  Take 1 tablet by mouth daily             Cholecalciferol (VITAMIN D3 PO)  Take by mouth daily             clopidogrel (PLAVIX) 75 MG tablet  Take 1 tablet by mouth daily             Cyanocobalamin (VITAMIN B-12 PO)  Take by mouth daily             docusate sodium (COLACE) 100 MG capsule  Take 100 mg by mouth 2 times daily             hydrocortisone (PROCTO-MED HC) 2.5 % CREA rectal cream  apply 1 applicatorful rectally four times a day if needed for HEMORRHOIDS             levothyroxine (SYNTHROID) 75 MCG tablet  take 1 tablet by mouth once daily             lipase-protease-amylase (CREON) 52503 units delayed release capsule  Take 1 capsule by mouth 3 times daily (with meals)             Magnesium Oxide 250 MG TABS  Take 400 mg by mouth 2 times daily              nystatin (MYCOSTATIN) 654098 UNIT/GM cream  Apply topically 2 times daily.              oxybutynin (DITROPAN XL) 10 MG extended release tablet  Take 1 tablet by mouth daily             pantoprazole (PROTONIX) 40 MG tablet  take 1 tablet by mouth once daily             promethazine (PHENERGAN) 25 MG tablet  take 1 tablet by mouth three times a day 30 MINUTES PRIOR TO MEALS for 10 days             rosuvastatin (CRESTOR) 20 MG tablet  Take 1 tablet by mouth nightly             simvastatin (ZOCOR) 10 MG tablet  take 1 tablet by mouth every evening             vitamin D (ERGOCALCIFEROL) 1.25 MG (26588 UT) CAPS capsule  Take 1 capsule by mouth once a week                   Medications marked \"taking\" at this time  Outpatient Medications Marked as Taking for the 6/1/21 encounter (Office Visit) with Julianna Castillo, DO   Medication Sig Dispense Refill    vitamin D (ERGOCALCIFEROL) 1.25 MG (47972 UT) CAPS capsule Take 1 capsule by mouth once a week 12 capsule 3    apixaban (ELIQUIS) 2.5 MG TABS tablet Take 1 tablet by mouth 2 times daily 60 tablet 3    amLODIPine (NORVASC) 2.5 MG tablet Take 1 tablet by mouth daily 30 tablet 3    aspirin 81 MG EC tablet Take 1 tablet by mouth daily 30 tablet 3    clopidogrel (PLAVIX) 75 MG tablet Take 1 tablet by mouth daily 15 tablet 0    rosuvastatin (CRESTOR) 20 MG tablet Take 1 tablet by mouth nightly 30 tablet 3    pantoprazole (PROTONIX) 40 MG tablet take 1 tablet by mouth once daily 30 tablet 3    Cholecalciferol (VITAMIN D3 PO) Take by mouth daily      Cyanocobalamin (VITAMIN B-12 PO) Take by mouth daily      simvastatin (ZOCOR) 10 MG tablet take 1 tablet by mouth every evening 90 tablet 3    levothyroxine (SYNTHROID) 75 MCG tablet take 1 tablet by mouth once daily 30 tablet 3    hydrocortisone (PROCTO-MED HC) 2.5 % CREA rectal cream apply 1 applicatorful rectally four times a day if needed for HEMORRHOIDS 56 g 5    docusate sodium (COLACE) 100 MG capsule Take 100 mg by mouth 2 times daily      nystatin (MYCOSTATIN) 252468 UNIT/GM cream Apply topically 2 times daily. 1 Tube 2    lipase-protease-amylase (CREON) 85747 units delayed release capsule Take 1 capsule by mouth 3 times daily (with meals) 270 capsule 3    Magnesium Oxide 250 MG TABS Take 400 mg by mouth 2 times daily           Medications patient taking as of now reconciled against medications ordered at time of hospital discharge: Yes    Chief Complaint   Patient presents with    Follow-Up from Hospital       History of Present illness - Follow up of Hospital diagnosis(es):   1. Hospital discharge follow-up    2. Acute ischemic stroke ([address]    3. Cognitive impairment    4. Stenosis of right carotid artery    5. Status post angioplasty with stent          Inpatient course: Discharge summary reviewed- see chart. Interval history/Current status: good    A comprehensive review of systems was negative except for what was noted in the HPI. Vitals:    06/01/21 1045   BP: 125/74   Pulse: 60   SpO2: 98%   Weight: 125 lb 9.6 oz (57 kg)   Height: 5' 5.05\" (1.652 m)     Body mass index is 20.87 kg/m².    Wt Readings from Last 3 Encounters:   06/01/21 125 lb 9.6 oz (57 kg)   05/18/21 123 lb 10.9 oz (56.1 kg)   04/27/21 121 lb (54.9 kg)     BP Readings from Last 3 Encounters:   06/01/21 125/74   05/18/21 (!) 135/50   04/27/21 123/60        Physical Exam:  General Appearance: alert and oriented to person, place and time, well developed and well- nourished, in no acute distress  Skin: warm and dry, no rash or erythema  Head: normocephalic and atraumatic  Eyes: pupils equal, round, and reactive to light, extraocular

## 2021-06-18 ENCOUNTER — TELEPHONE (OUTPATIENT)
Dept: FAMILY MEDICINE CLINIC | Age: 86
End: 2021-06-18

## 2021-06-28 ENCOUNTER — TELEPHONE (OUTPATIENT)
Dept: FAMILY MEDICINE CLINIC | Age: 86
End: 2021-06-28

## 2021-06-28 NOTE — TELEPHONE ENCOUNTER
Teresa Donald from [facility] wanted to inform that the patient fell over the bed and she has scrap on her left knee, patient has no pain stated the nurse they are putting antibiotic cream on the patient knee.  Please advise

## 2021-06-28 NOTE — TELEPHONE ENCOUNTER
She is supposed to be using Miralax and stool softner 1-2 every day always   can take Mag citrate now all otc

## 2021-06-28 NOTE — TELEPHONE ENCOUNTER
----- Message from Italia Whyte sent at 6/25/2021  5:47 PM EDT -----  Subject: Message to Provider    QUESTIONS  Information for Provider? Patient called in about bad constipation and was   wondering if she could get a prescription to help with shrinkage to help.  ---------------------------------------------------------------------------  --------------  CALL BACK INFO  What is the best way for the office to contact you? OK to leave message on   voicemail, OK to respond with electronic message via [employer] portal (only   for patients who have registered [employer] account)  Preferred Call Back Phone Number? [phone]  ---------------------------------------------------------------------------  --------------  SCRIPT ANSWERS  Relationship to Patient?  Self

## 2021-07-01 ENCOUNTER — TELEPHONE (OUTPATIENT)
Dept: FAMILY MEDICINE CLINIC | Age: 86
End: 2021-07-01

## 2021-07-01 RX ORDER — LEVOTHYROXINE SODIUM 0.07 MG/1
TABLET ORAL
Qty: 30 TABLET | Refills: 3 | Status: SHIPPED | OUTPATIENT
Start: 2021-07-01

## 2021-07-01 NOTE — TELEPHONE ENCOUNTER
Brittany Collier is calling to request a refill on the following medication(s):    Last Visit Date (If Applicable):  1/4/1662    Next Visit Date:    Visit date not found    Medication Request:  Requested Prescriptions     Pending Prescriptions Disp Refills    levothyroxine (SYNTHROID) 75 MCG tablet [Pharmacy Med Name: LEVOTHYROXINE 75 MCG TABLET] 30 tablet 3     Sig: take 1 tablet by mouth once daily

## 2021-07-01 NOTE — TELEPHONE ENCOUNTER
----- Message from Amelie Rivas MA sent at 7/1/2021  8:20 AM EDT -----  Subject: Message to Provider    QUESTIONS  Information for Provider? Patient states she was given proctomed   medication for hemorroids, asking if she can have a prescription to help   shrink the hemorroids. Proctomed is good for pain but wondering if there   is a medication for shrinkage. [facility] [place].  ---------------------------------------------------------------------------  --------------  [employer]  What is the best way for the office to contact you? OK to leave message on   [employer], OK to respond with electronic message via [employer] portal (only   for patients who have registered [employer] account)  Preferred Call Back Phone Number? [phone]  ---------------------------------------------------------------------------  --------------  SCRIPT ANSWERS  Relationship to Patient?  Self

## 2021-07-14 ENCOUNTER — OFFICE VISIT (OUTPATIENT)
Dept: NEUROLOGY | Age: 86
End: 2021-07-14
Payer: MEDICARE

## 2021-07-14 VITALS
DIASTOLIC BLOOD PRESSURE: 64 MMHG | HEIGHT: 66 IN | SYSTOLIC BLOOD PRESSURE: 135 MMHG | WEIGHT: 127 LBS | HEART RATE: 52 BPM | BODY MASS INDEX: 20.41 KG/M2

## 2021-07-14 DIAGNOSIS — I63.9 CEREBRAL INFARCTION, UNSPECIFIED MECHANISM (HCC): ICD-10-CM

## 2021-07-14 DIAGNOSIS — F02.80 DEMENTIA ASSOCIATED WITH OTHER UNDERLYING DISEASE WITHOUT BEHAVIORAL DISTURBANCE (HCC): ICD-10-CM

## 2021-07-14 DIAGNOSIS — F09 COGNITIVE DISORDER: Primary | ICD-10-CM

## 2021-07-14 PROCEDURE — 1123F ACP DISCUSS/DSCN MKR DOCD: CPT | Performed by: PSYCHIATRY & NEUROLOGY

## 2021-07-14 PROCEDURE — 1036F TOBACCO NON-USER: CPT | Performed by: PSYCHIATRY & NEUROLOGY

## 2021-07-14 PROCEDURE — 4040F PNEUMOC VAC/ADMIN/RCVD: CPT | Performed by: PSYCHIATRY & NEUROLOGY

## 2021-07-14 PROCEDURE — 99214 OFFICE O/P EST MOD 30 MIN: CPT | Performed by: PSYCHIATRY & NEUROLOGY

## 2021-07-14 PROCEDURE — 1090F PRES/ABSN URINE INCON ASSESS: CPT | Performed by: PSYCHIATRY & NEUROLOGY

## 2021-07-14 PROCEDURE — G8399 PT W/DXA RESULTS DOCUMENT: HCPCS | Performed by: PSYCHIATRY & NEUROLOGY

## 2021-07-14 PROCEDURE — G8427 DOCREV CUR MEDS BY ELIG CLIN: HCPCS | Performed by: PSYCHIATRY & NEUROLOGY

## 2021-07-14 PROCEDURE — G8420 CALC BMI NORM PARAMETERS: HCPCS | Performed by: PSYCHIATRY & NEUROLOGY

## 2021-07-14 RX ORDER — MEMANTINE HYDROCHLORIDE 10 MG/1
10 TABLET ORAL 2 TIMES DAILY
Qty: 60 TABLET | Refills: 3 | Status: SHIPPED | OUTPATIENT
Start: 2021-07-14 | End: 2021-10-20 | Stop reason: SDUPTHER

## 2021-07-14 RX ORDER — MEMANTINE HYDROCHLORIDE 5 MG/1
TABLET ORAL
Qty: 42 TABLET | Refills: 0 | Status: SHIPPED | OUTPATIENT
Start: 2021-07-14 | End: 2021-08-02

## 2021-07-14 RX ORDER — MAGNESIUM OXIDE 400 MG/1
400 TABLET ORAL 2 TIMES DAILY
COMMUNITY

## 2021-07-14 RX ORDER — ACETAMINOPHEN 500 MG
1000 TABLET ORAL PRN
COMMUNITY

## 2021-07-14 ASSESSMENT — ENCOUNTER SYMPTOMS
RESPIRATORY NEGATIVE: 1
EYES NEGATIVE: 1
GASTROINTESTINAL NEGATIVE: 1
ALLERGIC/IMMUNOLOGIC NEGATIVE: 1

## 2021-07-14 NOTE — PROGRESS NOTES
Active problem dementia with history of mild cognitive impairment aggravated with COVID  . The condition is she underwent MRI of Head showing acute small left temporal infarction undergoing admission to [facility] with CTA head and neck having 80 % left ICA stenosis undergoing stenting. She has atrial fibrillation on eliquis 2.5 mg po bid being on aspirin 81 mg and crestor 20 mg po qd . She and daughter report that memory complaints persists with no focal disturbance . Her daughter reports that she will have a conversation and she will forget . She is j having difficulty taking medications and daughter has concern that she is not fit for current independent living needing more assistance . Her daughter reports hat previously in [place] she had been diagnosed with predementia with mild memory complaints misplacing things. There has been short term memory which became an issue since she got COVID in [facility] . She was admitted to [facility] with nausea and vomiting going to rehabilitation for one day. Her  daughter reports that once coming home she was weak having syncopal event along with memory complaints readmitted to [facility] for 5 days. She reports that memory complaints have persisted asking the same thing constantly or telling same story . She was placed on aricept unable to tolerate havng nigtmares . She lives in independent ling at [facility] ambulating with walker with baseline imbalance  . She is provided one meal per day . She will fix sandwich or family bring meals for her . Her sister will do her checkbook unable to process. Before COVID there was mild forgetfulness . She has not driven for one year having erratic driving not getting lost . She is not taking showers because of fear of falling . Significant medications eliquis 2.5 mg po bid ,aspirn 81 mg po qd ,crestor 20 mg po qd . Previously on eliquis .  Testing TSH normal . MRI of Head with acute left superior temporal lbe infarction with mild chronic periventricular small vessel ischemia . CTA head and neck 80 % stenosis left cervical ICA . Mild stensosi left vertebral artery . Cardiac 2 D echo moderate LVH EF 65 % . Cholesterol 123, LDL 51, TG 55 . A46 695 , folic acid 31.6 , treopema pallidum nonreactive , ESR 15      Past Medical History:   Diagnosis Date    Acid reflux     Atrial fibrillation (HCC)     CARDIOLOGIST - DR. Lundy [facility]    Hyperlipidemia     Thyroid disease     TIA (transient ischemic attack)     SPENT 3 WKS IN REHAB    Wears dentures     UPPER       Past Surgical History:   Procedure Laterality Date    BLADDER SURGERY      suspension    HERNIA REPAIR  2012    x2    HYSTERECTOMY         History reviewed. No pertinent family history. Social History     Socioeconomic History    Marital status:      Spouse name: None    Number of children: None    Years of education: None    Highest education level: None   Occupational History    None   Tobacco Use    Smoking status: Never Smoker    Smokeless tobacco: Never Used   Vaping Use    Vaping Use: Never used   Substance and Sexual Activity    Alcohol use: Yes     Comment: rare    Drug use: Never    Sexual activity: Not Currently   Other Topics Concern    None   Social History Narrative    None     Social Determinants of Health     Financial Resource Strain: Low Risk     Difficulty of Paying Living Expenses: Not hard at all   Food Insecurity: No Food Insecurity    Worried About Running Out of Food in the Last Year: Never true    [address] in the Last Year: Never true   Transportation Needs:     Lack of Transportation (Medical):      Lack of Transportation (Non-Medical):    Physical Activity:     Days of Exercise per Week:     Minutes of Exercise per Session:    Stress:     Feeling of Stress :    Social Connections:     Frequency of Communication with Friends and Family:     Frequency of Social Gatherings with Friends and Family:     Attends Taoism Services:     Active Member of Clubs or Organizations:     Attends Club or Organization Meetings:     Marital Status:    Intimate Partner Violence:     Fear of Current or Ex-Partner:     Emotionally Abused:     Physically Abused:     Sexually Abused:        Current Outpatient Medications   Medication Sig Dispense Refill    acetaminophen (TYLENOL) 500 MG tablet Take 1,000 mg by mouth as needed      magnesium oxide (MAG-OX) 400 MG tablet Take 400 mg by mouth 2 times daily      memantine (NAMENDA) 5 MG tablet Take 1 po qd x 1 week , 1 po bid x 1 week , 1 po qAM , 2 po qhs 42 tablet 0    memantine (NAMENDA) 10 MG tablet Take 1 tablet by mouth 2 times daily 60 tablet 3    levothyroxine (SYNTHROID) 75 MCG tablet take 1 tablet by mouth once daily 30 tablet 3    vitamin D (ERGOCALCIFEROL) 1.25 MG (98814 UT) CAPS capsule Take 1 capsule by mouth once a week 12 capsule 3    apixaban (ELIQUIS) 2.5 MG TABS tablet Take 1 tablet by mouth 2 times daily 60 tablet 3    amLODIPine (NORVASC) 2.5 MG tablet Take 1 tablet by mouth daily 30 tablet 3    aspirin 81 MG EC tablet Take 1 tablet by mouth daily 30 tablet 3    rosuvastatin (CRESTOR) 20 MG tablet Take 1 tablet by mouth nightly 30 tablet 3    pantoprazole (PROTONIX) 40 MG tablet take 1 tablet by mouth once daily 30 tablet 3    Cyanocobalamin (VITAMIN B-12 PO) Take by mouth daily      simvastatin (ZOCOR) 10 MG tablet take 1 tablet by mouth every evening 90 tablet 3    hydrocortisone (PROCTO-MED HC) 2.5 % CREA rectal cream apply 1 applicatorful rectally four times a day if needed for HEMORRHOIDS 56 g 5    docusate sodium (COLACE) 100 MG capsule Take 100 mg by mouth 2 times daily      nystatin (MYCOSTATIN) 199819 UNIT/GM cream Apply topically 2 times daily.  1 Tube 2    lipase-protease-amylase (CREON) 61552 units delayed release capsule Take 1 capsule by mouth 3 times daily (with meals) 270 capsule 3    oxybutynin (DITROPAN XL) 10 MG extended release tablet Take 1 tablet by mouth daily (Patient not taking: Reported on 4/27/2021) 30 tablet 3     No current facility-administered medications for this visit. Allergies   Allergen Reactions    Percocet [Oxycodone-Acetaminophen]       Woozy    Hydrocodone          Review of Systems     Vitals:    07/14/21 1540   BP: 135/64   Pulse: 52     weight: 127 lb (57.6 kg)      Review of Systems   Constitutional: Negative. HENT: Negative. Eyes: Negative. Respiratory: Negative. Cardiovascular: Negative. Gastrointestinal: Negative. Endocrine: Negative. Genitourinary: Negative. Musculoskeletal: Positive for gait problem. Skin: Negative. Allergic/Immunologic: Negative. Hematological: Negative. Psychiatric/Behavioral: Positive for decreased concentration. Neurological Examination  Constitutional .General exam well groomed   Head/Ears /Nose/Throat: external ear . Normal exam  Neck and thyroid . Normal size. No bruits  Respiratory . Breathsounds clear bilaterally  Cardiovascular: Auscultation of heart with regular rate and rhythm  Musculoskeletal. Muscle bulk and tone normal                                                           Muscle strength 5/5 strength throughout                                                                                No dysmetria or dysdiadokinesis  No tremor   Normal fine motor  Gait mild apraxia  Orientation Alert and oriented x 3 . Wednesday July 14 , 2021 . President Arvin Telles . WORLD able to spell forwards and backwards . Serial 7 to 86  Attention and concentration normal  Short term memory 2 words out of 3 in one minute   Language process and speech normal . No aphasia  Cranial nerve 2 normal acuety and visual fields  Cranial nerve 3, 4 and 6 . Extraocular muscles are intact . Pupils are equal and reactive   Cranial nerve 5 . Intact corneal reflex. Normal facial sensation  Cranial nerve 7 normal exam   Cranial nerve 8.  Grossly intact hearing   Cranial nerve 9 and 10. Symmetric palate elevation   Cranial nerve 11 , 5 out of 5 strength   Cranial Nerve 12 midline tongue . No atrophy  Sensation . Normal pinprick and light touch   Deep Tendon Reflexes normal  Plantar response flexor bilaterally    ASSESSMENT/PLAN      Diagnosis Orders   1. Cognitive disorder     2. Dementia associated with other underlying disease without behavioral disturbance ([facility]     3. Cerebral infarction, unspecified mechanism ([facility]     Patient is felt to have dementia with history of mild cognitive impairment aggravated with COVID .  Will place patient on trial of namenda feeling that she needs more assistance to be moved to assisted living facility     As above

## 2021-07-16 ENCOUNTER — TELEPHONE (OUTPATIENT)
Dept: FAMILY MEDICINE CLINIC | Age: 86
End: 2021-07-16

## 2021-07-16 RX ORDER — FLUCONAZOLE 150 MG/1
150 TABLET ORAL ONCE
Qty: 2 TABLET | Refills: 0 | Status: SHIPPED | OUTPATIENT
Start: 2021-07-16 | End: 2021-07-16

## 2021-07-16 NOTE — TELEPHONE ENCOUNTER
----- Message from [place], MA sent at 7/16/2021  8:39 AM EDT -----  Subject: Message to Provider    QUESTIONS  Information for Provider? Patient complaining of vaginal irritation, had   this before for some thing similar to Monistat. Request prescription be   sent to [facility].   ---------------------------------------------------------------------------  --------------  [address]  What is the best way for the office to contact you? OK to leave message on   voicemail, OK to respond with electronic message via [email or web address] portal (only   for patients who have registered [email or web address] account)  Preferred Call Back Phone Number? [phone]  ---------------------------------------------------------------------------  --------------  SCRIPT ANSWERS  Relationship to Patient?  Self

## 2021-07-27 ENCOUNTER — TELEPHONE (OUTPATIENT)
Dept: FAMILY MEDICINE CLINIC | Age: 86
End: 2021-07-27

## 2021-07-27 NOTE — TELEPHONE ENCOUNTER
----- Message from Sukumar Rousseau sent at 7/27/2021  3:27 PM EDT -----  Subject: Refill Request    QUESTIONS  Name of Medication? simvastatin (ZOCOR) 10 MG tablet  Patient-reported dosage and instructions? take 1 tablet by mouth every   evening  How many days do you have left? 0  Preferred Pharmacy? [facility] phone number (if available)? [phone]  ---------------------------------------------------------------------------  --------------,  Name of Medication? amLODIPine (NORVASC) 2.5 MG tablet  Patient-reported dosage and instructions? Take 1 tablet by mouth daily -   Oral  How many days do you have left? 0  Preferred Pharmacy? [facility] phone number (if available)? [phone]  ---------------------------------------------------------------------------  --------------  CALL BACK INFO  What is the best way for the office to contact you? OK to leave message on   voicemail  Preferred Call Back Phone Number?  [phone]

## 2021-08-01 DIAGNOSIS — F02.80 DEMENTIA ASSOCIATED WITH OTHER UNDERLYING DISEASE WITHOUT BEHAVIORAL DISTURBANCE (HCC): ICD-10-CM

## 2021-08-01 DIAGNOSIS — F09 COGNITIVE DISORDER: Primary | ICD-10-CM

## 2021-08-01 DIAGNOSIS — R41.3 MEMORY LOSS: ICD-10-CM

## 2021-08-03 RX ORDER — MEMANTINE HYDROCHLORIDE 5 MG/1
TABLET ORAL
Qty: 90 TABLET | Refills: 3 | Status: SHIPPED | OUTPATIENT
Start: 2021-08-03 | End: 2021-10-20 | Stop reason: CLARIF

## 2021-08-16 ENCOUNTER — HOSPITAL ENCOUNTER (OUTPATIENT)
Age: 86
Setting detail: SPECIMEN
Discharge: HOME | End: 2021-08-16
Payer: MEDICARE

## 2021-08-16 LAB
ANION GAP SERPL CALCULATED.3IONS-SCNC: 8 MMOL/L (ref 9–17)
BUN BLDV-MCNC: 14 MG/DL (ref 8–23)
BUN/CREAT BLD: ABNORMAL (ref 9–20)
CALCIUM SERPL-MCNC: 8.8 MG/DL (ref 8.6–10.4)
CHLORIDE BLD-SCNC: 101 MMOL/L (ref 98–107)
CO2: 23 MMOL/L (ref 20–31)
CREAT SERPL-MCNC: 0.84 MG/DL (ref 0.5–0.9)
GFR AFRICAN AMERICAN: >60 ML/MIN
GFR NON-AFRICAN AMERICAN: >60 ML/MIN
GFR SERPL CREATININE-BSD FRML MDRD: ABNORMAL ML/MIN/{1.73_M2}
GFR SERPL CREATININE-BSD FRML MDRD: ABNORMAL ML/MIN/{1.73_M2}
GLUCOSE BLD-MCNC: 73 MG/DL (ref 70–99)
HCT VFR BLD CALC: 34.4 % (ref 36.3–47.1)
HEMOGLOBIN: 11.1 G/DL (ref 11.9–15.1)
MCH RBC QN AUTO: 31.8 PG (ref 25.2–33.5)
MCHC RBC AUTO-ENTMCNC: 32.3 G/DL (ref 28.4–34.8)
MCV RBC AUTO: 98.6 FL (ref 82.6–102.9)
NRBC AUTOMATED: 0 PER 100 WBC
PDW BLD-RTO: 13.7 % (ref 11.8–14.4)
PLATELET # BLD: 282 K/UL (ref 138–453)
PMV BLD AUTO: 9.6 FL (ref 8.1–13.5)
POTASSIUM SERPL-SCNC: 4.3 MMOL/L (ref 3.7–5.3)
RBC # BLD: 3.49 M/UL (ref 3.95–5.11)
SODIUM BLD-SCNC: 132 MMOL/L (ref 135–144)
WBC # BLD: 6.5 K/UL (ref 3.5–11.3)

## 2021-08-16 PROCEDURE — 36415 COLL VENOUS BLD VENIPUNCTURE: CPT

## 2021-08-16 PROCEDURE — 80048 BASIC METABOLIC PNL TOTAL CA: CPT

## 2021-08-16 PROCEDURE — 85027 COMPLETE CBC AUTOMATED: CPT

## 2021-08-16 PROCEDURE — P9603 ONE-WAY ALLOW PRORATED MILES: HCPCS

## 2021-08-16 RX ORDER — PANCRELIPASE 60000; 12000; 38000 [USP'U]/1; [USP'U]/1; [USP'U]/1
CAPSULE, DELAYED RELEASE PELLETS ORAL
Qty: 270 CAPSULE | Refills: 3 | Status: SHIPPED | OUTPATIENT
Start: 2021-08-16

## 2021-08-16 NOTE — TELEPHONE ENCOUNTER
Buffy Fischer is calling to request a refill on the following medication(s):    Last Visit Date (If Applicable):  3/0/9086    Next Visit Date:    Visit date not found    Medication Request:  Requested Prescriptions     Pending Prescriptions Disp Refills    CREON 35982-21668 units delayed release capsule [Pharmacy Med Name: Vanesa Humphries DR 12,000 UNITS CAPSULE] 270 capsule 3     Sig: take 1 capsule by mouth three times a day with meals

## 2021-08-25 ENCOUNTER — HOSPITAL ENCOUNTER (OUTPATIENT)
Age: 86
Setting detail: SPECIMEN
Discharge: HOME | End: 2021-08-25
Payer: MEDICARE

## 2021-08-25 LAB
TSH SERPL DL<=0.05 MIU/L-ACNC: 16.29 MIU/L (ref 0.3–5)
VITAMIN B-12: 1263 PG/ML (ref 232–1245)
VITAMIN D 25-HYDROXY: 26.1 NG/ML (ref 30–100)

## 2021-08-25 PROCEDURE — 84443 ASSAY THYROID STIM HORMONE: CPT

## 2021-08-25 PROCEDURE — 36415 COLL VENOUS BLD VENIPUNCTURE: CPT

## 2021-08-25 PROCEDURE — 82306 VITAMIN D 25 HYDROXY: CPT

## 2021-08-25 PROCEDURE — P9603 ONE-WAY ALLOW PRORATED MILES: HCPCS

## 2021-08-25 PROCEDURE — 82607 VITAMIN B-12: CPT

## 2021-08-29 RX ORDER — ASPIRIN 81 MG/1
TABLET ORAL
Qty: 30 TABLET | Refills: 3 | Status: SHIPPED | OUTPATIENT
Start: 2021-08-29

## 2021-08-30 RX ORDER — ROSUVASTATIN CALCIUM 20 MG/1
20 TABLET, COATED ORAL NIGHTLY
Qty: 30 TABLET | Refills: 3 | Status: SHIPPED | OUTPATIENT
Start: 2021-08-30

## 2021-09-01 ENCOUNTER — TELEPHONE (OUTPATIENT)
Dept: FAMILY MEDICINE CLINIC | Age: 86
End: 2021-09-01

## 2021-09-01 NOTE — TELEPHONE ENCOUNTER
----- Message from Bunny Shirley sent at 9/1/2021  3:19 PM EDT -----  Subject: Message to Provider    QUESTIONS  Information for Provider? [address] is calling . They want   to know if Sarwat Holcomb will follow the patient for home health care. Please call Sabina back at [phone] ex [phone].   ---------------------------------------------------------------------------  --------------  Cheri Grass INFO  What is the best way for the office to contact you? OK to leave message on   voicemail  Preferred Call Back Phone Number? [phone]  ---------------------------------------------------------------------------  --------------  SCRIPT ANSWERS  Relationship to Patient? Third Party  Representative Name?  [employer]

## 2021-09-03 ENCOUNTER — TELEPHONE (OUTPATIENT)
Dept: FAMILY MEDICINE CLINIC | Age: 86
End: 2021-09-03

## 2021-09-03 NOTE — TELEPHONE ENCOUNTER
Pedro Pablo Read is calling to request a refill on the following medication(s):    Last Visit Date (If Applicable):  8/7/6446    Next Visit Date:    Visit date not found    Medication Request:  Requested Prescriptions     Pending Prescriptions Disp Refills    hydrocortisone (PROCTO-MED HC) 2.5 % CREA rectal cream [Pharmacy Med Name: PROCTO-MED HC 2.5% CREAM] 56 g 5     Sig: insert 1 applicatorful rectally four times a day if needed for HEMORRHOIDS

## 2021-09-03 NOTE — TELEPHONE ENCOUNTER
----- Pt is resident at [facility]. She wants to speak to her PCP regarding some health issues she is having.

## 2021-09-05 RX ORDER — HYDROCORTISONE 25 MG/G
CREAM TOPICAL
Qty: 56 G | Refills: 5 | Status: SHIPPED | OUTPATIENT
Start: 2021-09-05

## 2021-09-06 ENCOUNTER — PATIENT MESSAGE (OUTPATIENT)
Dept: FAMILY MEDICINE CLINIC | Age: 86
End: 2021-09-06

## 2021-09-07 ENCOUNTER — TELEPHONE (OUTPATIENT)
Dept: FAMILY MEDICINE CLINIC | Age: 86
End: 2021-09-07

## 2021-09-07 NOTE — TELEPHONE ENCOUNTER
----- Message from Carolynnn Closs sent at 9/3/2021  4:09 PM EDT -----  Subject: Message to Provider    QUESTIONS  Information for Provider? pt called in to inform  that she is having   constipation issues and the nurses where she is at which is assisted   living at [facility] in [place] will only give her colace twice a   day and she would like to be given mirilax also would like  to call her   and advise on this  ---------------------------------------------------------------------------  --------------  [address]  What is the best way for the office to contact you? OK to leave message on   voicemail  Preferred Call Back Phone Number? [phone]  ---------------------------------------------------------------------------  --------------  SCRIPT ANSWERS  Relationship to Patient?  Self

## 2021-09-07 NOTE — TELEPHONE ENCOUNTER
From: Roxanne Zee  To: Leila Vargas DO  Sent: 9/6/2021 4:39 PM EDT  Subject: Non-Urgent Medical Question    Hello. This is barbs daughter Merary Warner. She says she's still weak and believed physical therapy would help. She resides at [facility] in [place]. They have staff here that she could use. Can you send a script for that?

## 2021-09-07 NOTE — TELEPHONE ENCOUNTER
----- Message from Lacie Owens sent at 9/7/2021  8:25 AM EDT -----  Subject: Message to Provider    QUESTIONS  Information for Provider? Patient is calling because she would like to   speak with Dr. Marta Akers about medications but doesn't want a VV or in person   visit.   ---------------------------------------------------------------------------  --------------  [address]  What is the best way for the office to contact you? OK to leave message on   voicemail  Preferred Call Back Phone Number? [phone]  ---------------------------------------------------------------------------  --------------  SCRIPT ANSWERS  Relationship to Patient?  Self

## 2021-09-09 ENCOUNTER — VIRTUAL VISIT (OUTPATIENT)
Dept: FAMILY MEDICINE CLINIC | Age: 86
End: 2021-09-09
Payer: MEDICARE

## 2021-09-09 DIAGNOSIS — K59.04 CHRONIC IDIOPATHIC CONSTIPATION: Primary | ICD-10-CM

## 2021-09-09 PROCEDURE — 99442 PR PHYS/QHP TELEPHONE EVALUATION 11-20 MIN: CPT | Performed by: FAMILY MEDICINE

## 2021-09-09 ASSESSMENT — PATIENT HEALTH QUESTIONNAIRE - PHQ9
2. FEELING DOWN, DEPRESSED OR HOPELESS: 0
SUM OF ALL RESPONSES TO PHQ QUESTIONS 1-9: 0
SUM OF ALL RESPONSES TO PHQ9 QUESTIONS 1 & 2: 0
1. LITTLE INTEREST OR PLEASURE IN DOING THINGS: 0
SUM OF ALL RESPONSES TO PHQ QUESTIONS 1-9: 0
SUM OF ALL RESPONSES TO PHQ QUESTIONS 1-9: 0

## 2021-09-09 NOTE — PROGRESS NOTES
Raudel Hernadez is a 82 y.o. female evaluated via telephone on 9/9/2021. Consent:  She and/or health care decision maker is aware that that she may receive a bill for this telephone service, depending on her insurance coverage, and has provided verbal consent to proceed: Yes      Documentation:  I communicated with the patient and/or health care decision maker about continuing to have ongoing issues with constipation  Apparently she is in an assisted living facility right now she had previously been taking MiraLAX on a daily basis and for whatever reason    Giving it for she does not know what to do to get them to give it to her on a daily basis and not up as needed  I spoke to both the daughter and the patient  Otherwise she seems to be doing fairly well. Details of this discussion including any medical advice provided: We will call the assisted living facility and make sure that they give it daily without a as needed order      I affirm this is a Patient Initiated Episode with a Patient who has not had a related appointment within my department in the past 7 days or scheduled within the next 24 hours. Patient identification was verified at the start of the visit: Yes    Total Time: minutes: 11-20 minutes    The visit was conducted pursuant to the emergency declaration under the [facility] [place], [address] [place] [address] authority and the [employer] General Act. Patient identification was verified, and a caregiver was present when appropriate. The patient was located in a state where the provider was credentialed to provide care.     Note: not billable if this call serves to triage the patient into an appointment for the relevant concern      Pino Medrano DO

## 2021-09-10 ENCOUNTER — TELEPHONE (OUTPATIENT)
Dept: FAMILY MEDICINE CLINIC | Age: 86
End: 2021-09-10

## 2021-09-10 NOTE — TELEPHONE ENCOUNTER
Meghan Mo from [employer]  wanted to make a note that patient is now resident in nursing facility and will be seeing the house doctor now. So if patient calls her for orders the doctor in facility is taking over care.  Please advise

## 2021-10-04 ENCOUNTER — TELEPHONE (OUTPATIENT)
Dept: FAMILY MEDICINE CLINIC | Age: 86
End: 2021-10-04

## 2021-10-04 NOTE — TELEPHONE ENCOUNTER
Sunnie Sandhoff was contacted as a part of [employer]. Patient will call PCP office another time to schedule.

## 2021-10-20 ENCOUNTER — OFFICE VISIT (OUTPATIENT)
Dept: NEUROLOGY | Age: 86
End: 2021-10-20
Payer: MEDICARE

## 2021-10-20 VITALS
HEIGHT: 65 IN | DIASTOLIC BLOOD PRESSURE: 67 MMHG | WEIGHT: 129.4 LBS | SYSTOLIC BLOOD PRESSURE: 123 MMHG | BODY MASS INDEX: 21.56 KG/M2 | HEART RATE: 56 BPM

## 2021-10-20 DIAGNOSIS — I48.91 ATRIAL FIBRILLATION, UNSPECIFIED TYPE (HCC): ICD-10-CM

## 2021-10-20 DIAGNOSIS — F02.80 DEMENTIA ASSOCIATED WITH OTHER UNDERLYING DISEASE WITHOUT BEHAVIORAL DISTURBANCE (HCC): Primary | ICD-10-CM

## 2021-10-20 DIAGNOSIS — I63.9 CEREBRAL INFARCTION, UNSPECIFIED MECHANISM (HCC): ICD-10-CM

## 2021-10-20 PROCEDURE — G8399 PT W/DXA RESULTS DOCUMENT: HCPCS | Performed by: PSYCHIATRY & NEUROLOGY

## 2021-10-20 PROCEDURE — 1090F PRES/ABSN URINE INCON ASSESS: CPT | Performed by: PSYCHIATRY & NEUROLOGY

## 2021-10-20 PROCEDURE — G8427 DOCREV CUR MEDS BY ELIG CLIN: HCPCS | Performed by: PSYCHIATRY & NEUROLOGY

## 2021-10-20 PROCEDURE — 99214 OFFICE O/P EST MOD 30 MIN: CPT | Performed by: PSYCHIATRY & NEUROLOGY

## 2021-10-20 PROCEDURE — 1123F ACP DISCUSS/DSCN MKR DOCD: CPT | Performed by: PSYCHIATRY & NEUROLOGY

## 2021-10-20 PROCEDURE — G8484 FLU IMMUNIZE NO ADMIN: HCPCS | Performed by: PSYCHIATRY & NEUROLOGY

## 2021-10-20 PROCEDURE — 1036F TOBACCO NON-USER: CPT | Performed by: PSYCHIATRY & NEUROLOGY

## 2021-10-20 PROCEDURE — 4040F PNEUMOC VAC/ADMIN/RCVD: CPT | Performed by: PSYCHIATRY & NEUROLOGY

## 2021-10-20 PROCEDURE — G8420 CALC BMI NORM PARAMETERS: HCPCS | Performed by: PSYCHIATRY & NEUROLOGY

## 2021-10-20 RX ORDER — LEVOTHYROXINE SODIUM 0.1 MG/1
TABLET ORAL
COMMUNITY
Start: 2021-09-22

## 2021-10-20 RX ORDER — MEMANTINE HYDROCHLORIDE 10 MG/1
10 TABLET ORAL 2 TIMES DAILY
Qty: 60 TABLET | Refills: 11 | Status: SHIPPED | OUTPATIENT
Start: 2021-10-20

## 2021-10-20 ASSESSMENT — ENCOUNTER SYMPTOMS
EYES NEGATIVE: 1
RESPIRATORY NEGATIVE: 1
GASTROINTESTINAL NEGATIVE: 1
ALLERGIC/IMMUNOLOGIC NEGATIVE: 1

## 2021-10-20 NOTE — PROGRESS NOTES
Active problem dementia aggravated with COVID placed on namenda feeling that she needs more assistance to be moved to assisted living facility . Left cerebral infarction with left ICA stenosis undergoing stenting , May 2021. The condition is she is tolerating namenda well with daughter thinking her memory is about the same asking same question over and over . She is living in assisted living in [facility] using walker with some baseline imbalance on her feet . MRI of Head showing acute small left temporal infarction in May admitted to [facility] with CTA head and neck having 80 % left ICA stenosis undergoing stenting. She has atrial fibrillation on eliquis 2.5 mg po bid being on aspirin 81 mg and crestor 20 mg po qd . She and daughter report that memory complaints persists with no focal disturbance . Her daughter reports that she will have a conversation and she will forget . There has been short term memory which became an issue since she got COVID in Thanksgiving . Before COVID there was mild forgetfulness . She has not driven for one year having erratic driving not getting lost .  Significant medications namenda 10 mg po bid , eliquis 2.5 mg po bid ,aspirn 81 mg po qd ,crestor 20 mg po qd . Previously on aricept causing nightmares . Testing TSH normal . MRI of Head with acute left superior temporal lbe infarction with mild chronic periventricular small vessel ischemia . CTA head and neck 80 % stenosis left cervical ICA . Mild stensosi left vertebral artery . Cardiac 2 D echo moderate LVH EF 65 % . Cholesterol 123, LDL 51, TG 55 . N13 648 , folic acid 99.4 , treopema pallidum nonreactive , ESR 15      Past Medical History:   Diagnosis Date    Acid reflux     Atrial fibrillation (HCC)     CARDIOLOGIST -   [address] [place] [address]    Hyperlipidemia     Thyroid disease     TIA (transient ischemic attack)     SPENT 3 WKS IN REHAB    Wears dentures     UPPER       Past Surgical History:   Procedure Laterality Date    BLADDER SURGERY      suspension    HERNIA REPAIR  2012    x2    HYSTERECTOMY         History reviewed. No pertinent family history. Social History     Socioeconomic History    Marital status:      Spouse name: None    Number of children: None    Years of education: None    Highest education level: None   Occupational History    None   Tobacco Use    Smoking status: Never Smoker    Smokeless tobacco: Never Used   Vaping Use    Vaping Use: Never used   Substance and Sexual Activity    Alcohol use: Yes     Comment: rare    Drug use: Never    Sexual activity: Not Currently   Other Topics Concern    None   Social History Narrative    None     Social Determinants of Health     Financial Resource Strain:     Difficulty of Paying Living Expenses:    Food Insecurity:     Worried About Running Out of Food in the Last Year:     Ran Out of Food in the Last Year:    Transportation Needs:     Lack of Transportation (Medical):      Lack of Transportation (Non-Medical):    Physical Activity:     Days of Exercise per Week:     Minutes of Exercise per Session:    Stress:     Feeling of Stress :    Social Connections:     Frequency of Communication with Friends and Family:     Frequency of Social Gatherings with Friends and Family:     Attends Rastafarian Services:     Active Member of Clubs or Organizations:     Attends Club or Organization Meetings:     Marital Status:    Intimate Partner Violence:     Fear of Current or Ex-Partner:     Emotionally Abused:     Physically Abused:     Sexually Abused:        Current Outpatient Medications   Medication Sig Dispense Refill    levothyroxine (SYNTHROID) 100 MCG tablet       memantine (NAMENDA) 10 MG tablet Take 1 tablet by mouth 2 times daily 60 tablet 11    hydrocortisone (PROCTO-MED HC) 2.5 % CREA rectal cream insert 1 applicatorful rectally four times a day if needed for HEMORRHOIDS 56 g 5    rosuvastatin (CRESTOR) 20 MG tablet take 1 tablet by Constitutional: Negative. HENT: Negative. Eyes: Negative. Respiratory: Negative. Cardiovascular: Negative. Gastrointestinal: Negative. Endocrine: Negative. Genitourinary: Negative. Musculoskeletal: Positive for gait problem. Skin: Negative. Allergic/Immunologic: Negative. Hematological: Negative. Psychiatric/Behavioral: Positive for decreased concentration. Neurological Examination  Constitutional .General exam well groomed   Head/Ears /Nose/Throat: external ear . Normal exam  Neck and thyroid . Normal size. No bruits  Respiratory . Breathsounds clear bilaterally  Cardiovascular: Auscultation of heart with regular rate and rhythm  Musculoskeletal. Muscle bulk and tone normal                                                           Muscle strength 5/5 strength throughout                                                                                No dysmetria or dysdiadokinesis  No tremor   Normal fine motor  Gait mild apraxia  Orientation Alert and oriented x 3 . Wednesday October , 2021 . President Genevieve Severe . WORLD able to spell forwards and backwards . Serial 7 to 86  Attention and concentration normal  Short term memory 2 words out of 3 in one minute   Language process and speech normal . No aphasia  Cranial nerve 2 normal acuety and visual fields  Cranial nerve 3, 4 and 6 . Extraocular muscles are intact . Pupils are equal and reactive   Cranial nerve 5 . Intact corneal reflex. Normal facial sensation  Cranial nerve 7 normal exam   Cranial nerve 8. Grossly intact hearing   Cranial nerve 9 and 10. Symmetric palate elevation   Cranial nerve 11 , 5 out of 5 strength   Cranial Nerve 12 midline tongue . No atrophy  Sensation . Normal pinprick and light touch   Deep Tendon Reflexes normal  Plantar response flexor bilaterally    ASSESSMENT/PLAN      Diagnosis Orders   1. Dementia associated with other underlying disease without behavioral disturbance ([address]     2. Cerebral infarction, unspecified mechanism ([facility]     3.  Atrial fibrillation, unspecified type [facility])     She is to continue on current medication regimen     As above

## 2021-12-21 ENCOUNTER — TELEPHONE (OUTPATIENT)
Dept: FAMILY MEDICINE CLINIC | Age: 86
End: 2021-12-21

## 2021-12-21 NOTE — TELEPHONE ENCOUNTER
BMdr law called in staing that the patients benefits are being denied. They are being denied because they state that she is not chronically ill. The family has contacted [employer] and they want to know if you could give them a statement stating that she is chronically ill in order for her benefits to be approved.    She would like you to please give her a call back    Aurelio [facility]   [phone]

## 2021-12-21 NOTE — TELEPHONE ENCOUNTER
she can have a letter for that she has multiple multiple chronic medical problems as listed in her chart I do not have time to do this until I come back they can wait or you guys can do it

## 2021-12-22 NOTE — TELEPHONE ENCOUNTER
I called them to let them know that you are on vacation and the letter will not be completed until then.

## 2022-01-03 ENCOUNTER — TELEPHONE (OUTPATIENT)
Dept: FAMILY MEDICINE CLINIC | Age: 87
End: 2022-01-03

## 2022-01-03 NOTE — TELEPHONE ENCOUNTER
Spoke to Buck at [employer] they need a copy of the letter I done in November 2020 fax [phone] or email to Fidel Richard at [employer] [facility] [employer] [facility]. [facility]

## 2022-03-21 ENCOUNTER — HOSPITAL ENCOUNTER (OUTPATIENT)
Dept: RADIATION ONCOLOGY | Age: 87
Discharge: HOME | End: 2022-03-21
Payer: MEDICARE

## 2022-03-21 VITALS
RESPIRATION RATE: 14 BRPM | BODY MASS INDEX: 20.77 KG/M2 | SYSTOLIC BLOOD PRESSURE: 134 MMHG | OXYGEN SATURATION: 98 % | WEIGHT: 124.8 LBS | DIASTOLIC BLOOD PRESSURE: 71 MMHG | HEART RATE: 61 BPM | TEMPERATURE: 97 F

## 2022-03-21 DIAGNOSIS — C49.0 SARCOMA OF FACE (HCC): Primary | ICD-10-CM

## 2022-03-21 PROCEDURE — 99213 OFFICE O/P EST LOW 20 MIN: CPT | Performed by: RADIOLOGY

## 2022-03-21 PROCEDURE — 99205 OFFICE O/P NEW HI 60 MIN: CPT | Performed by: RADIOLOGY

## 2022-03-21 NOTE — PROGRESS NOTES
Vitals:    03/21/22 1115   BP: 134/71   Pulse: 61   Resp: 14   Temp: 97 °F (36.1 °C)   SpO2: 98%    :  Patient Currently in Pain: Denies             Wt Readings from Last 1 Encounters:   03/21/22 124 lb 12.8 oz (56.6 kg)        Body mass index is 20.77 kg/m². Immunizations:    Influenza status:    [x]   Current   []   Patient declined    Pneumococcal status:  [x]   Current  []   Patient declined  Covid status:   [x]  Dose #1:                     [x]  Dose #2:     [x]  Booster:               []  Patient declined    Smoking Status:    [] Smoker - PPD:   [] Nonsmoker - Quit Date:               [x] Never a smoker      No chief complaint on file. Cancer Staging  No matching staging information was found for the patient. Prior Radiation Therapy? No   If yes, site treated:   Facility:                             Date:    Concurrent Chemo/radiation? No   If yes, start date:    Prior Chemotherapy? No   If yes    Facility:                             Date:    Prior Hormonal Therapy or Contraceptive Medications? No   If yes,  Medication:                                Duration:    Head and Neck Cancer? No   If yes, please remind physician to place swallow study order and speech therapy order. Pacemaker/Defibulator/ICD:  No    Do you have any musculoskeletal diseases, Lupus or arthritic conditions? No   If yes, are you currently taking Methotrexate?   []  Yes  []  No                 Current Outpatient Medications   Medication Sig Dispense Refill    levothyroxine (SYNTHROID) 100 MCG tablet       memantine (NAMENDA) 10 MG tablet Take 1 tablet by mouth 2 times daily 60 tablet 11    hydrocortisone (PROCTO-MED HC) 2.5 % CREA rectal cream insert 1 applicatorful rectally four times a day if needed for HEMORRHOIDS 56 g 5    rosuvastatin (CRESTOR) 20 MG tablet take 1 tablet by mouth nightly 30 tablet 3    aspirin 81 MG EC tablet take 1 tablet by mouth once daily 30 tablet 3    pantoprazole (PROTONIX) 40 MG tablet take 1 tablet by mouth once daily 30 tablet 11    CREON 65010-26735 units delayed release capsule take 1 capsule by mouth three times a day with meals (Patient taking differently: Take 3,000 Units by mouth 3 times daily (with meals) ) 270 capsule 3    acetaminophen (TYLENOL) 500 MG tablet Take 1,000 mg by mouth as needed      magnesium oxide (MAG-OX) 400 MG tablet Take 400 mg by mouth 2 times daily      levothyroxine (SYNTHROID) 75 MCG tablet take 1 tablet by mouth once daily (Patient taking differently: Take 100 mcg by mouth Daily ) 30 tablet 3    vitamin D (ERGOCALCIFEROL) 1.25 MG (96282 UT) CAPS capsule Take 1 capsule by mouth once a week (Patient taking differently: Take 5,000 Units by mouth daily ) 12 capsule 3    apixaban (ELIQUIS) 2.5 MG TABS tablet Take 1 tablet by mouth 2 times daily (Patient not taking: Reported on 10/20/2021) 60 tablet 3    amLODIPine (NORVASC) 2.5 MG tablet Take 1 tablet by mouth daily 30 tablet 3    Cyanocobalamin (VITAMIN B-12 PO) Take by mouth daily (Patient not taking: Reported on 10/20/2021)      simvastatin (ZOCOR) 10 MG tablet take 1 tablet by mouth every evening (Patient not taking: Reported on 10/20/2021) 90 tablet 3    oxybutynin (DITROPAN XL) 10 MG extended release tablet Take 1 tablet by mouth daily (Patient not taking: Reported on 4/27/2021) 30 tablet 3    docusate sodium (COLACE) 100 MG capsule Take 100 mg by mouth 2 times daily      nystatin (MYCOSTATIN) 612317 UNIT/GM cream Apply topically 2 times daily. 1 Tube 2     No current facility-administered medications for this encounter. Past Medical History:   Diagnosis Date    Acid reflux     Atrial fibrillation (HCC)     CARDIOLOGIST -   [address] [place] [address]    Hyperlipidemia     Thyroid disease     TIA (transient ischemic attack)     SPENT 3 WKS IN REHAB    Wears dentures     UPPER       Past Surgical History:   Procedure Laterality Date    BLADDER SURGERY      suspension    HERNIA REPAIR  2012    x2    HYSTERECTOMY         No family history on file. Social History     Socioeconomic History    Marital status:      Spouse name: Not on file    Number of children: Not on file    Years of education: Not on file    Highest education level: Not on file   Occupational History    Not on file   Tobacco Use    Smoking status: Never Smoker    Smokeless tobacco: Never Used   Vaping Use    Vaping Use: Never used   Substance and Sexual Activity    Alcohol use: Yes     Comment: rare    Drug use: Never    Sexual activity: Not Currently   Other Topics Concern    Not on file   Social History Narrative    Not on file     Social Determinants of Health     Financial Resource Strain:     Difficulty of Paying Living Expenses: Not on file   Food Insecurity:     Worried About Running Out of Food in the Last Year: Not on file    Jovanna of Food in the Last Year: Not on file   Transportation Needs:     Lack of Transportation (Medical): Not on file    Lack of Transportation (Non-Medical):  Not on file   Physical Activity:     Days of Exercise per Week: Not on file    Minutes of Exercise per Session: Not on file   Stress:     Feeling of Stress : Not on file   Social Connections:     Frequency of Communication with Friends and Family: Not on file    Frequency of Social Gatherings with Friends and Family: Not on file    Attends Sikh Services: Not on file    Active Member of [address] [place] [address] [employer] or Organizations: Not on file    Attends Club or Organization Meetings: Not on file    Marital Status: Not on file   Intimate Partner Violence:     Fear of Current or Ex-Partner: Not on file    Emotionally Abused: Not on file    Physically Abused: Not on file    Sexually Abused: Not on file   Housing Stability:     Unable to Pay for Housing in the Last Year: Not on file    Number of [place] in the Last Year: Not on file    Unstable Housing in the Last Year: Not on file             FALLS RISK SCREEN  Instructions:  Assess the patient and enter the appropriate indicators that are present for fall risk identification. Total the numbers entered and assign a fall risk score from Table 2.  Reassess patient at a minimum every 12 weeks or with status change. Assessment   Date  3/21/2022     1. Mental Ability: confusion/cognitively impaired 3     2. Elimination Issues: incontinence, frequency 0       3. Ambulatory: use of assistive devices (walker, cane, off-loading devices),        attached to equipment (IV pole, oxygen) 2     4. Sensory Limitations: dizziness, vertigo, impaired vision 0     5. Age less than 65        0     6. Age 72 or greater 1     7. Medication: diuretics, strong analgesics, hypnotics, sedatives,        antihypertensive agents 0   8. Falls:  recent history of falls within the last 3 months (not to include slipping or        tripping) 0   TOTAL 6    If score of 4 or greater was education given? Yes       TABLE 2   Risk Score Risk Level Plan of Care   0-3 Little or  No Risk 1. Provide assistance as indicated for ambulation activities  2. Reorient confused/cognitively impaired patient  3. Call-light/bell within patient's reach  4. Chair/bed in low position, stretcher/bed with siderails up except when performing patient care activities  5. Educate patient/family/caregiver on falls prevention  6.  Reassess in 12 weeks or with any noted change in patient condition which places them at a risk for a fall   4-6 Moderate Risk 1. Provide assistance as indicated for ambulation activities  2. Reorient confused/cognitively impaired patient  3. Call-light/bell within patient's reach  4. Chair/bed in low position, stretcher/bed with siderails up except when performing patient care activities  5. Educate patient/family/caregiver on falls prevention     7 or   Higher High Risk 1. Place patient in easily observable treatment room  2.   Patient attended at all times by family member or staff  3. Provide assistance as indicated for ambulation activities  4. Reorient confused/cognitively impaired patient  5. Call-light/bell within patient's reach  6. Chair/bed in low position, stretcher/bed with siderails up except when performing patient care activities  7. Educate patient/family/caregiver on falls prevention             Assessment/Plan: Patient was seen today for consultation with supportive daughter. She currently lives in [address] [place] [address].           Ignacio Ordaz RN 3/21/2022 11:25 AM

## 2022-03-21 NOTE — CONSULTS
[facility]            Radiation Oncology          [address] [facility]          [place] [address]        Valorie Tracy: [phone]        F: [phone]       [employer]           3/21/2022    Patient: Katrin Stubbs   YOB: 1935       Dear Dr Vega Kapaau:    Thank you for referring Katrin Stubbs to me for evaluation. Below are the relevant portions of my assessment and plan of care. If you have questions, please do not hesitate to call me. I look forward to following this patient along with you. Sincerely,    Electronically signed by Yvon Giraldo MD on 3/21/22 at 11:26 AM EDT    CC: Patient Care Team:  Oksana Zhang DO as PCP - General (Family Medicine)  Oksana Zhang DO as PCP - [facility] Provider  ------------------------------------------------------------------------------------------------------------------------------------------------------------------------------------------      RADIATION ONCOLOGY NEW PATIENT VISIT:    Date of Service: 3/21/2022    Location:  [place] [address] [facility],   [address] [place] [address] Fabiano Reno   [phone]     Patient ID:   Katrin Stubbs  : 1935   MRN: [id number]    CHIEF COMPLAINT: \"pleomorphic dermal sarcoma of the nose\"    DIAGNOSIS:  Pleomorphic dermal sarcoma of the nose     HISTORY OF PRESENT ILLNESS:   Katrin Stubbs is a 82 y.o. female who presents for consultation radiotherapy treatment options the above diagnosis. She was doing well not having any issues or problems today. She noted an area on of her nose the groove would not go away. This it initially started on the left upper side. This occurred about 2 years ago when she had it excised twice at outside institutions. Last summer she underwent surgical resection in July of last year with a full thickness skin graft reconstruction. After the surgery however the the deep margins were positive.  She noted a growth on the right side 3    acetaminophen (TYLENOL) 500 MG tablet, Take 1,000 mg by mouth as needed, Disp: , Rfl:     magnesium oxide (MAG-OX) 400 MG tablet, Take 400 mg by mouth 2 times daily, Disp: , Rfl:     levothyroxine (SYNTHROID) 75 MCG tablet, take 1 tablet by mouth once daily (Patient taking differently: Take 100 mcg by mouth Daily ), Disp: 30 tablet, Rfl: 3    vitamin D (ERGOCALCIFEROL) 1.25 MG (17901 UT) CAPS capsule, Take 1 capsule by mouth once a week (Patient taking differently: Take 5,000 Units by mouth daily ), Disp: 12 capsule, Rfl: 3    apixaban (ELIQUIS) 2.5 MG TABS tablet, Take 1 tablet by mouth 2 times daily (Patient not taking: Reported on 10/20/2021), Disp: 60 tablet, Rfl: 3    amLODIPine (NORVASC) 2.5 MG tablet, Take 1 tablet by mouth daily, Disp: 30 tablet, Rfl: 3    Cyanocobalamin (VITAMIN B-12 PO), Take by mouth daily (Patient not taking: Reported on 10/20/2021), Disp: , Rfl:     simvastatin (ZOCOR) 10 MG tablet, take 1 tablet by mouth every evening (Patient not taking: Reported on 10/20/2021), Disp: 90 tablet, Rfl: 3    oxybutynin (DITROPAN XL) 10 MG extended release tablet, Take 1 tablet by mouth daily (Patient not taking: Reported on 4/27/2021), Disp: 30 tablet, Rfl: 3    docusate sodium (COLACE) 100 MG capsule, Take 100 mg by mouth 2 times daily, Disp: , Rfl:     nystatin (MYCOSTATIN) 132433 UNIT/GM cream, Apply topically 2 times daily. , Disp: 1 Tube, Rfl: 2    ALLERGIES:   Allergies   Allergen Reactions    Percocet [Oxycodone-Acetaminophen]       Woozy    Hydrocodone        SOCIAL HISTORY:  Social History     Socioeconomic History    Marital status:      Spouse name: Not on file    Number of children: Not on file    Years of education: Not on file    Highest education level: Not on file   Occupational History    Not on file   Tobacco Use    Smoking status: Never Smoker    Smokeless tobacco: Never Used   Vaping Use    Vaping Use: Never used   Substance and Sexual Activity    Alcohol use: Yes     Comment: rare    Drug use: Never    Sexual activity: Not Currently   Other Topics Concern    Not on file   Social History Narrative    Not on file     Social Determinants of Health     Financial Resource Strain:     Difficulty of Paying Living Expenses: Not on file   Food Insecurity:     Worried About Running Out of Food in the Last Year: Not on file    Jovanna of Food in the Last Year: Not on file   Transportation Needs:     Lack of Transportation (Medical): Not on file    Lack of Transportation (Non-Medical): Not on file   Physical Activity:     Days of Exercise per Week: Not on file    Minutes of Exercise per Session: Not on file   Stress:     Feeling of Stress : Not on file   Social Connections:     Frequency of Communication with Friends and Family: Not on file    Frequency of Social Gatherings with Friends and Family: Not on file    Attends [employer] Services: Not on file    Active Member of [address] [place] [address] or Organizations: Not on file    Attends Club or Organization Meetings: Not on file    Marital Status: Not on file   Intimate Partner Violence:     Fear of Current or Ex-Partner: Not on file    Emotionally Abused: Not on file    Physically Abused: Not on file    Sexually Abused: Not on file   Housing Stability:     Unable to Pay for Housing in the Last Year: Not on file    Number of [place] in the Last Year: Not on file    Unstable Housing in the Last Year: Not on file       FAMILY HISTORY:  No family history on file. REVIEW OF SYSTEMS:    GENERAL/CONSTITUTIONAL: The patient denies fever, fatigue, weakness, weight gain or weight loss. HEENT: The patient denies pain, redness, loss of vision, double or blurred vision. The patient denies ringing in the ears, loss of hearing, hoarseness, trouble swallowing, or painful swallowing. CARDIOVASCULAR: The patient denies chest pain, irregular heartbeats, sudden changes in heartbeat or palpitation.   RESPIRATORY: The patient denies shortness of breath, cough, hemoptysis. GASTROINTESTINAL: The patient denies nausea, vomiting, diarrhea, constipation, blood in the stools. GENITOURINARY: The patient denies difficult urination, pain or burning with urination, blood in the urine. MUSCULOSKELETAL: The patient denies arm, buttock, thigh or calf cramps. No joint or muscle pain. SKIN: The patient denies easy bruising, skin redness, skin rash, hives. NEUROLOGIC: The patient denies headache, dizziness, fainting, muscle spasm, loss of consciousness. ENDOCRINE: The patient denies intolerance to hot or cold temperature, flushing. HEMATOLOGIC/LYMPHATIC: The patient denies anemia, bleeding tendency or clotting tendency. ALLERGIC/IMMUNOLOGIC: The patient denies rhinitis, asthma, skin sensitivity. PYSCHOLOGIC: The patient denies any depression, anxiety, or confusion. A full 14 point review of systems was performed and assessed and found to be negative except as noted above. PHYSICAL EXAMINATION:    CHAPERONE: Not Required    ECOG:  asymptomatic    VITAL SIGNS: /71   Pulse 61   Temp 97 °F (36.1 °C) (Temporal)   Resp 14   Wt 124 lb 12.8 oz (56.6 kg)   SpO2 98%   BMI 20.77 kg/m²   GENERAL:  General appearance is that of a well-nourished, well-developed in no apparent distress. HEENT: Normocephalic, atraumatic. Skin graft present on the bridge of her nose and extending both sides. No evidence of nodules or tumor present. NECK:  No adenopathy or a palpable thyroid mass, trachea is midline. LYMPHATICS: No cervical, supraclavicular, or axillary adenopathy. HEART:  Normal rate and regular rhythm, normal heart sounds. LUNGS:  Pulmonary effort normal. Normal breath sounds. ABDOMEN:  Soft, nontender, non distended, and no hepatosplenomegaly. EXTREMITIES:  No edema. No calf tenderness. MSK:  No spinal tenderness. Normal ROM. NEUROLOGICAL: Alert and oriented. Strength and sensation intact bilaterally. No focal deficits. PSYCH: Mood normal, behavior normal.  SKIN: No erythema, no desquamation. LABS:  WBC   Date Value Ref Range Status   08/16/2021 6.5 3.5 - 11.3 k/uL Final     Segs Absolute   Date Value Ref Range Status   05/12/2021 5.82 1.50 - 8.10 k/uL Final     Hemoglobin   Date Value Ref Range Status   08/16/2021 11.1 (L) 11.9 - 15.1 g/dL Final     Platelets   Date Value Ref Range Status   08/16/2021 282 138 - 453 k/uL Final     No results found for: , CEA  No results found for:   No results found for: PSA      IMAGING:   none    PATHOLOGY:  Pathology from biopsies and excision reviewed and noted above. ASSESSMENT AND PLAN:   Miah Leone is a 82 y.o. 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 She has a rare tumor, and we talked about the diagnosis and prognosis at length today with her daughter present. It recurred multiple times and started on the left and is now on the right. Her last surgery she has negative margins. We talked about the natural history of the tumor and that there are limited studies even retroactively as it is so uncommon. I think there would be a role for radiation in her case. Although she has a negative margin, we have seen its aggressiveness by its number of recurrence. We discussed the indications, risk, and benefits at length and at the end of her conversation she expressed understanding of her diagnosis and my recommendations. Before we proceed. I will get a PET scan to make sure there is no evidence of local or distant recurrence. We will have that done and then see her back for follow-up to discuss the scan and potentially do a CT simulation afterwards. If I treat her will be the postoperative bed, 60Gy/30fx. I'll be able to make my final recommendations after the PET/CT is reviewed.      Patient was in agreement with my recommendations. All questions were answered to their satisfaction. Patient was advised to contact us anytime should they have any questions or concerns. I spent greater than 65 minutes counseling and evaluating the different treatment options available to the patient and formulating a plan of action today. Mary Osuna MD MD  Electronically signed by Mary Osuna MD on 3/21/22 at 11:26 AM EDT      Drugs Prescribed:  New Prescriptions    No medications on file       Orders Placed:   No orders of the defined types were placed in this encounter.         CC:  Patient Care Team:  Marcia Tello DO as PCP - General (Family Medicine)  Marcia Tello DO as PCP - [facility] Provider

## 2022-03-23 DIAGNOSIS — C49.0 MALIGNANT NEOPLASM OF CONNECTIVE AND SOFT TISSUE OF HEAD, FACE, AND NECK (HCC): Primary | ICD-10-CM

## 2022-03-28 ENCOUNTER — HOSPITAL ENCOUNTER (OUTPATIENT)
Dept: NUCLEAR MEDICINE | Age: 87
Discharge: HOME | End: 2022-03-30
Payer: MEDICARE

## 2022-03-28 ENCOUNTER — TELEPHONE (OUTPATIENT)
Dept: GASTROENTEROLOGY | Age: 87
End: 2022-03-28

## 2022-03-28 DIAGNOSIS — C49.0 MALIGNANT NEOPLASM OF CONNECTIVE AND SOFT TISSUE OF HEAD, FACE, AND NECK (HCC): ICD-10-CM

## 2022-03-28 DIAGNOSIS — C49.0 SARCOMA OF FACE (HCC): ICD-10-CM

## 2022-03-28 LAB — GLUCOSE BLD-MCNC: 73 MG/DL (ref 65–105)

## 2022-03-28 PROCEDURE — 2580000003 HC RX 258: Performed by: RADIOLOGY

## 2022-03-28 PROCEDURE — 3430000000 HC RX DIAGNOSTIC RADIOPHARMACEUTICAL: Performed by: RADIOLOGY

## 2022-03-28 PROCEDURE — 82947 ASSAY GLUCOSE BLOOD QUANT: CPT

## 2022-03-28 PROCEDURE — 78816 PET IMAGE W/CT FULL BODY: CPT

## 2022-03-28 PROCEDURE — A9552 F18 FDG: HCPCS | Performed by: RADIOLOGY

## 2022-03-28 RX ORDER — FLUDEOXYGLUCOSE F 18 200 MCI/ML
10 INJECTION, SOLUTION INTRAVENOUS
Status: COMPLETED | OUTPATIENT
Start: 2022-03-28 | End: 2022-03-28

## 2022-03-28 RX ORDER — SODIUM CHLORIDE 0.9 % (FLUSH) 0.9 %
5-40 SYRINGE (ML) INJECTION 2 TIMES DAILY
Status: DISCONTINUED | OUTPATIENT
Start: 2022-03-28 | End: 2022-03-31 | Stop reason: HOSPADM

## 2022-03-28 RX ADMIN — FLUDEOXYGLUCOSE F 18 8.04 MILLICURIE: 200 INJECTION, SOLUTION INTRAVENOUS at 08:33

## 2022-03-28 RX ADMIN — SODIUM CHLORIDE, PRESERVATIVE FREE 10 ML: 5 INJECTION INTRAVENOUS at 08:33

## 2022-04-04 ENCOUNTER — APPOINTMENT (OUTPATIENT)
Dept: RADIATION ONCOLOGY | Age: 87
End: 2022-04-04
Attending: RADIOLOGY
Payer: MEDICARE

## 2022-04-05 ENCOUNTER — HOSPITAL ENCOUNTER (OUTPATIENT)
Dept: ULTRASOUND IMAGING | Facility: CLINIC | Age: 87
Discharge: HOME | End: 2022-04-07

## 2022-04-05 DIAGNOSIS — K86.2 PANCREATIC CYST: ICD-10-CM

## 2022-04-05 DIAGNOSIS — R11.0 CHRONIC NAUSEA: ICD-10-CM

## 2022-04-08 ENCOUNTER — APPOINTMENT (OUTPATIENT)
Dept: RADIATION ONCOLOGY | Age: 87
End: 2022-04-08
Attending: RADIOLOGY
Payer: MEDICARE

## 2022-04-08 ENCOUNTER — TELEPHONE (OUTPATIENT)
Dept: RADIATION ONCOLOGY | Age: 87
End: 2022-04-08

## 2022-04-08 ENCOUNTER — HOSPITAL ENCOUNTER (OUTPATIENT)
Dept: RADIATION ONCOLOGY | Age: 87
Discharge: HOME | End: 2022-04-08
Attending: RADIOLOGY
Payer: MEDICARE

## 2022-04-08 NOTE — TELEPHONE ENCOUNTER
Pt no show for her appt today, I called daughter, Dasha Harrison, states they forgot. I offered for them to still come today. Dasha Harrison in [place] and pt in [place], not able to make it today. I rescheduled pt follow up/teach/SIM appt for next week, Tues 4/12/22 @8:00 a.m.

## 2022-04-12 ENCOUNTER — HOSPITAL ENCOUNTER (OUTPATIENT)
Dept: RADIATION ONCOLOGY | Age: 87
Discharge: HOME | End: 2022-04-12
Attending: RADIOLOGY
Payer: MEDICARE

## 2022-04-12 ENCOUNTER — HOSPITAL ENCOUNTER (OUTPATIENT)
Dept: RADIATION ONCOLOGY | Age: 87
End: 2022-04-12
Attending: RADIOLOGY
Payer: MEDICARE

## 2022-04-12 VITALS
WEIGHT: 126 LBS | BODY MASS INDEX: 20.97 KG/M2 | SYSTOLIC BLOOD PRESSURE: 148 MMHG | RESPIRATION RATE: 14 BRPM | HEART RATE: 73 BPM | DIASTOLIC BLOOD PRESSURE: 73 MMHG | TEMPERATURE: 97.3 F

## 2022-04-12 PROCEDURE — 99213 OFFICE O/P EST LOW 20 MIN: CPT | Performed by: RADIOLOGY

## 2022-04-12 PROCEDURE — 99212 OFFICE O/P EST SF 10 MIN: CPT | Performed by: RADIOLOGY

## 2022-04-12 RX ORDER — ONDANSETRON 4 MG/1
4 TABLET, FILM COATED ORAL EVERY 8 HOURS PRN
COMMUNITY

## 2022-04-12 RX ORDER — MELOXICAM 7.5 MG/1
7.5 TABLET ORAL DAILY
COMMUNITY

## 2022-04-12 RX ORDER — LIDOCAINE 4 G/G
1 PATCH TOPICAL DAILY
COMMUNITY

## 2022-04-12 RX ORDER — DULOXETIN HYDROCHLORIDE 30 MG/1
30 CAPSULE, DELAYED RELEASE ORAL DAILY
COMMUNITY

## 2022-04-12 NOTE — PROGRESS NOTES
[facility]            Radiation Oncology          [address] [facility]          [place] [address]        John Maria: [phone]        F: [phone]       [email or web address]         Date of Service: 2022     Location:  [address],   [address] [facility], [place] [address]   [phone]        RADIATION ONCOLOGY FOLLOW UP NOTE    Patient ID:   Alena Lagunas  : 1935   MRN: [id number]    DIAGNOSIS: Pleomorphic dermal sarcoma of the nose     INTERVAL HISTORY:   Alena Lagunas is a 82 y.o. [place]  female who presents for short-term follow-up the above diagnosis. She had a PET/CT on 3/28/2022 which was negative. It showed no evidence of locally recurrent or new metastatic disease. She has no new issues or problems. The skin graft is doing well without evidence of issues. She denies any new lumps or bumps, masses in the nose, problems with congestion or sinuses, headache, cough, or involuntary weight loss.     MEDICATIONS:    Current Outpatient Medications:     DULoxetine (CYMBALTA) 30 MG extended release capsule, Take 30 mg by mouth daily, Disp: , Rfl:     lidocaine (HM LIDOCAINE PATCH) 4 % external patch, Place 1 patch onto the skin daily To right shoulder, Disp: , Rfl:     meloxicam (MOBIC) 7.5 MG tablet, Take 7.5 mg by mouth daily, Disp: , Rfl:     ondansetron (ZOFRAN) 4 MG tablet, Take 4 mg by mouth every 8 hours as needed for Nausea or Vomiting, Disp: , Rfl:     Polyethylene Glycol 3350 (MIRALAX PO), Take by mouth, Disp: , Rfl:     levothyroxine (SYNTHROID) 100 MCG tablet, , Disp: , Rfl:     memantine (NAMENDA) 10 MG tablet, Take 1 tablet by mouth 2 times daily, Disp: 60 tablet, Rfl: 11    hydrocortisone (PROCTO-MED HC) 2.5 % CREA rectal cream, insert 1 applicatorful rectally four times a day if needed for HEMORRHOIDS, Disp: 56 g, Rfl: 5    rosuvastatin (CRESTOR) 20 MG tablet, take 1 tablet by mouth nightly, Disp: 30 tablet, Rfl: 3   aspirin 81 MG EC tablet, take 1 tablet by mouth once daily, Disp: 30 tablet, Rfl: 3    pantoprazole (PROTONIX) 40 MG tablet, take 1 tablet by mouth once daily, Disp: 30 tablet, Rfl: 11    CREON 64521-49919 units delayed release capsule, take 1 capsule by mouth three times a day with meals (Patient taking differently: Take 3,000 Units by mouth 3 times daily (with meals) ), Disp: 270 capsule, Rfl: 3    acetaminophen (TYLENOL) 500 MG tablet, Take 1,000 mg by mouth as needed, Disp: , Rfl:     magnesium oxide (MAG-OX) 400 MG tablet, Take 400 mg by mouth 2 times daily, Disp: , Rfl:     levothyroxine (SYNTHROID) 75 MCG tablet, take 1 tablet by mouth once daily (Patient taking differently: Take 100 mcg by mouth Daily ), Disp: 30 tablet, Rfl: 3    vitamin D (ERGOCALCIFEROL) 1.25 MG (24290 UT) CAPS capsule, Take 1 capsule by mouth once a week (Patient taking differently: Take 5,000 Units by mouth daily ), Disp: 12 capsule, Rfl: 3    apixaban (ELIQUIS) 2.5 MG TABS tablet, Take 1 tablet by mouth 2 times daily (Patient not taking: Reported on 10/20/2021), Disp: 60 tablet, Rfl: 3    amLODIPine (NORVASC) 2.5 MG tablet, Take 1 tablet by mouth daily, Disp: 30 tablet, Rfl: 3    Cyanocobalamin (VITAMIN B-12 PO), Take by mouth daily (Patient not taking: Reported on 10/20/2021), Disp: , Rfl:     simvastatin (ZOCOR) 10 MG tablet, take 1 tablet by mouth every evening (Patient not taking: Reported on 10/20/2021), Disp: 90 tablet, Rfl: 3    oxybutynin (DITROPAN XL) 10 MG extended release tablet, Take 1 tablet by mouth daily (Patient not taking: Reported on 4/27/2021), Disp: 30 tablet, Rfl: 3    docusate sodium (COLACE) 100 MG capsule, Take 100 mg by mouth 2 times daily, Disp: , Rfl:     nystatin (MYCOSTATIN) 822231 UNIT/GM cream, Apply topically 2 times daily. , Disp: 1 Tube, Rfl: 2    ALLERGIES:  Allergies   Allergen Reactions    Percocet [Oxycodone-Acetaminophen]       Woozy    Hydrocodone          REVIEW OF SYSTEMS:    A full 14 point review of systems was performed and assessed and found to be negative except as noted above. PHYSICAL EXAMINATION:    CHAPERONE: Not Required    ECO Asymptomatic    VITAL SIGNS: BP (!) 148/73   Pulse 73   Temp 97.3 °F (36.3 °C) (Temporal)   Resp 14   Wt 126 lb (57.2 kg)   BMI 20.97 kg/m²   GENERAL:  General appearance is that of a well-nourished, well-developed in no apparent distress. HEENT: Normocephalic, atraumatic, EOMI, moist mucosa, no erythema. Graft on nose healthy appearing. NECK:  No adenopathy or a palpable thyroid mass, trachea is midline. LYMPHATICS: No cervical, supraclavicular, or axillary adenopathy. HEART:  Normal rate and regular rhythm, normal heart sounds. LUNGS:  Pulmonary effort normal. Normal breath sounds. ABDOMEN:  Soft, nontender, non distended, and no hepatosplenomegaly. EXTREMITIES:  No edema. No calf tenderness. MSK:  No spinal tenderness. Normal ROM. NEUROLOGICAL: Alert and oriented. Strength and sensation intact bilaterally. No focal deficits. PSYCH: Mood normal, behavior normal.  SKIN: No erythema, no desquamation. LABS:  WBC   Date Value Ref Range Status   2021 6.5 3.5 - 11.3 k/uL Final     Segs Absolute   Date Value Ref Range Status   2021 5.82 1.50 - 8.10 k/uL Final     Hemoglobin   Date Value Ref Range Status   2021 11.1 (L) 11.9 - 15.1 g/dL Final     Platelets   Date Value Ref Range Status   2021 282 138 - 453 k/uL Final     No results found for: , CEA  No results found for: PSA    IMAGING:   PET/CT 3/28/22 reviewed and noted above. ASSESSMENT AND PLAN:  Pedro Pablo Read is a 82 y.o. female with Pleomorphic dermal sarcoma of the nose status post excision with positive margin followed by wide local excision with full thickness graft who presents for discussion of adjuvant options. Her PET/CT was negative which is good.   We had a very long discussion again today about the indications, risks, and benefits of adjuvant radiation therapy in the setting of pleomorphic dermal sarcoma. She had a positive margin of the initial surgery and it recurred. Upon literature review which is fairly scant, a positive margin seems to be the #1 risk factor for recurrence. After second wide local excision and skin graft, the margins were negative. There is controversy whether adjuvant radiation is needed in the setting of a negative margin. Because of her age, and location of the surgical bed, and scant evidence of how to adequately deal with this type of tumor, I think it would be better to hold off on adjuvant radiation at this time. I told her I am worried about dose to her eyes as well as the skin graft itself. Radiation can be a risk factor for skin graft failure because of where it is is very thin and tight at this time. If she had a positive margin I would 100% recommend adjuvant treatment. But since she does not, I think it would be safe to forego it and undergo close observation. I told her she needs to have an exam and a scope every 3 to 4 months. I would also recommend getting a PET/CT in another 6 to 12 months. After long discussion she and her daughter expressed understanding of her diagnosis, prognosis, and my recommendations. They are okay with that for now and have appointment with her surgeon on the 18th. I had be happy to see them back after that visit if the rest of her team insist that she has a radiation. For now we will hold off. Patient was in agreement with my recommendations. All questions were answered to their satisfaction. Patient was advised to contact us anytime should they have any questions or concerns. Electronically signed by Eric Mosqueda MD on 4/12/2022 at 12:00 PM        Medications Prescribed:   New Prescriptions    No medications on file       Orders: No orders of the defined types were placed in this encounter.       CC:  Patient Care Team:  Megan Cantu DO as PCP - General (Family Medicine)  Arturo Chauhan DO as PCP - [facility] Empaneled Provider

## 2022-04-19 NOTE — TELEPHONE ENCOUNTER
Patients daughter Kandace Elaine called and and the referral should be in system. She needs to make her mom an appointment now please.   Please call Kandace Elaine at [phone]

## 2022-04-29 NOTE — TELEPHONE ENCOUNTER
I called to update. Complex Repair And Rotation Flap Text: The defect edges were debeveled with a #15 scalpel blade.  The primary defect was closed partially with a complex linear closure.  Given the location of the remaining defect, shape of the defect and the proximity to free margins a rotation flap was deemed most appropriate for complete closure of the defect.  Using a sterile surgical marker, an appropriate advancement flap was drawn incorporating the defect and placing the expected incisions within the relaxed skin tension lines where possible.    The area thus outlined was incised deep to adipose tissue with a #15 scalpel blade.  The skin margins were undermined to an appropriate distance in all directions utilizing iris scissors.

## 2022-12-28 RX ORDER — HYDROCORTISONE 25 MG/G
CREAM TOPICAL
Qty: 56 G | Refills: 5 | Status: SHIPPED | OUTPATIENT
Start: 2022-12-28

## 2022-12-28 NOTE — TELEPHONE ENCOUNTER
Rachel Wan is calling to request a refill on the following medication(s):    Last Visit Date (If Applicable):  7/0/5398    Next Visit Date:    Visit date not found    Medication Request:  Requested Prescriptions     Pending Prescriptions Disp Refills    hydrocortisone (PROCTO-MED HC) 2.5 % CREA rectal cream [Pharmacy Med Name: PROCTO-MED HC 2.5% CREAM] 56 g 5     Sig: insert 1 applicatorful rectally four times a day if needed for HEMORRHOIDS

## 2023-07-06 ENCOUNTER — HOSPITAL ENCOUNTER (OUTPATIENT)
Dept: CT IMAGING | Age: 88
Discharge: HOME | End: 2023-07-08
Payer: MEDICARE

## 2023-07-06 DIAGNOSIS — D44.9: ICD-10-CM

## 2023-07-06 LAB — CREAT BLD-MCNC: 0.9 MG/DL (ref 0.6–1.4)

## 2023-07-06 PROCEDURE — 82565 ASSAY OF CREATININE: CPT

## 2023-07-06 PROCEDURE — 6360000004 HC RX CONTRAST MEDICATION: Performed by: PHYSICIAN ASSISTANT

## 2023-07-06 PROCEDURE — 2580000003 HC RX 258: Performed by: PHYSICIAN ASSISTANT

## 2023-07-06 PROCEDURE — 71260 CT THORAX DX C+: CPT

## 2023-07-06 RX ORDER — SODIUM CHLORIDE 0.9 % (FLUSH) 0.9 %
10 SYRINGE (ML) INJECTION PRN
Status: DISCONTINUED | OUTPATIENT
Start: 2023-07-06 | End: 2023-07-09 | Stop reason: HOSPADM

## 2023-07-06 RX ORDER — 0.9 % SODIUM CHLORIDE 0.9 %
80 INTRAVENOUS SOLUTION INTRAVENOUS ONCE
Status: COMPLETED | OUTPATIENT
Start: 2023-07-06 | End: 2023-07-06

## 2023-07-06 RX ADMIN — SODIUM CHLORIDE, PRESERVATIVE FREE 10 ML: 5 INJECTION INTRAVENOUS at 10:58

## 2023-07-06 RX ADMIN — SODIUM CHLORIDE 80 ML: 9 INJECTION, SOLUTION INTRAVENOUS at 10:58

## 2023-07-06 RX ADMIN — IOPAMIDOL 75 ML: 755 INJECTION, SOLUTION INTRAVENOUS at 10:58

## 2023-07-18 ENCOUNTER — TELEPHONE (OUTPATIENT)
Dept: RADIATION ONCOLOGY | Age: 88
End: 2023-07-18

## 2023-07-18 NOTE — TELEPHONE ENCOUNTER
Patient being referred back to rad onc contacted daughter to schedule extended follow up given 8/1/23 @3pm with Dr. Reinier Brooks.

## 2023-08-01 ENCOUNTER — HOSPITAL ENCOUNTER (OUTPATIENT)
Dept: RADIATION ONCOLOGY | Age: 88
Discharge: HOME | End: 2023-08-01
Payer: MEDICARE

## 2023-08-01 VITALS
TEMPERATURE: 98.4 F | WEIGHT: 124.8 LBS | HEART RATE: 53 BPM | RESPIRATION RATE: 16 BRPM | BODY MASS INDEX: 20.77 KG/M2 | DIASTOLIC BLOOD PRESSURE: 85 MMHG | SYSTOLIC BLOOD PRESSURE: 162 MMHG | OXYGEN SATURATION: 94 %

## 2023-08-01 PROCEDURE — 99212 OFFICE O/P EST SF 10 MIN: CPT | Performed by: RADIOLOGY

## 2023-08-01 NOTE — CONSULTS
Diagnosis Date    Acid reflux     Atrial fibrillation (HCC)     CARDIOLOGIST -   [address]    Hyperlipidemia     Thyroid disease     TIA (transient ischemic attack)     SPENT 3 WKS IN REHAB    Wears dentures     UPPER       PAST SURGICAL HISTORY:  Past Surgical History:   Procedure Laterality Date    BLADDER SURGERY      suspension    HERNIA REPAIR  2012    x2    HYSTERECTOMY         MEDICATIONS:    Current Outpatient Medications:     hydrocortisone (PROCTO-MED HC) 2.5 % CREA rectal cream, insert 1 applicatorful rectally four times a day if needed for HEMORRHOIDS, Disp: 56 g, Rfl: 5    DULoxetine (CYMBALTA) 30 MG extended release capsule, Take 30 mg by mouth daily, Disp: , Rfl:     lidocaine (HM LIDOCAINE PATCH) 4 % external patch, Place 1 patch onto the skin daily To right shoulder, Disp: , Rfl:     meloxicam (MOBIC) 7.5 MG tablet, Take 7.5 mg by mouth daily, Disp: , Rfl:     ondansetron (ZOFRAN) 4 MG tablet, Take 4 mg by mouth every 8 hours as needed for Nausea or Vomiting, Disp: , Rfl:     Polyethylene Glycol 3350 (MIRALAX PO), Take by mouth, Disp: , Rfl:     levothyroxine (SYNTHROID) 100 MCG tablet, , Disp: , Rfl:     memantine (NAMENDA) 10 MG tablet, Take 1 tablet by mouth 2 times daily, Disp: 60 tablet, Rfl: 11    rosuvastatin (CRESTOR) 20 MG tablet, take 1 tablet by mouth nightly, Disp: 30 tablet, Rfl: 3    aspirin 81 MG EC tablet, take 1 tablet by mouth once daily, Disp: 30 tablet, Rfl: 3    pantoprazole (PROTONIX) 40 MG tablet, take 1 tablet by mouth once daily, Disp: 30 tablet, Rfl: 11    CREON 28578-38942 units delayed release capsule, take 1 capsule by mouth three times a day with meals (Patient taking differently: Take 3,000 Units by mouth 3 times daily (with meals) ), Disp: 270 capsule, Rfl: 3    acetaminophen (TYLENOL) 500 MG tablet, Take 1,000 mg by mouth as needed, Disp: , Rfl:     magnesium oxide (MAG-OX) 400 MG tablet, Take 400 mg by mouth 2 times daily, Disp: , Rfl:     levothyroxine (SYNTHROID)

## 2023-08-01 NOTE — PROGRESS NOTES
tablet, Rfl: 3    vitamin D (ERGOCALCIFEROL) 1.25 MG (96200 UT) CAPS capsule, Take 1 capsule by mouth once a week (Patient taking differently: Take 5,000 Units by mouth daily ), Disp: 12 capsule, Rfl: 3    apixaban (ELIQUIS) 2.5 MG TABS tablet, Take 1 tablet by mouth 2 times daily (Patient not taking: Reported on 10/20/2021), Disp: 60 tablet, Rfl: 3    amLODIPine (NORVASC) 2.5 MG tablet, Take 1 tablet by mouth daily, Disp: 30 tablet, Rfl: 3    Cyanocobalamin (VITAMIN B-12 PO), Take by mouth daily (Patient not taking: Reported on 10/20/2021), Disp: , Rfl:     simvastatin (ZOCOR) 10 MG tablet, take 1 tablet by mouth every evening (Patient not taking: Reported on 10/20/2021), Disp: 90 tablet, Rfl: 3    oxybutynin (DITROPAN XL) 10 MG extended release tablet, Take 1 tablet by mouth daily (Patient not taking: Reported on 4/27/2021), Disp: 30 tablet, Rfl: 3    docusate sodium (COLACE) 100 MG capsule, Take 100 mg by mouth 2 times daily, Disp: , Rfl:     nystatin (MYCOSTATIN) 778051 UNIT/GM cream, Apply topically 2 times daily. , Disp: 1 Tube, Rfl: 2      FALLS RISK SCREEN  Instructions:  Assess the patient and enter the appropriate indicators that are present for fall risk identification. Total the numbers entered and assign a fall risk score from Table 2.  Reassess patient at a minimum every 12 weeks or with status change. Assessment   Date  8/1/2023     1. Mental Ability: confusion/cognitively impaired 0     2. Elimination Issues: incontinence, frequency 0       3. Ambulatory: use of assistive devices (walker, cane, off-loading devices),        attached to equipment (IV pole, oxygen) 0     4. Sensory Limitations: dizziness, vertigo, impaired vision 0     5. Age less than 65        0     6. Age 72 or greater 1     7. Medication: diuretics, strong analgesics, hypnotics, sedatives,        antihypertensive agents 2   8.   Falls:  recent history of falls within the last 3 months (not to include slipping or

## 2023-08-03 ENCOUNTER — TELEPHONE (OUTPATIENT)
Dept: RADIATION ONCOLOGY | Age: 88
End: 2023-08-03

## 2023-08-03 NOTE — TELEPHONE ENCOUNTER
Per Dr. Kayli Raphael, he would like to speak to pts oncologist @[facility] regarding pts radiation treatment. I called [phone] Dr. Rosalind Nelson, no answer, I left detailed messg for  for doctor to c/b to speak w/Dr. Kayli Raphael.

## 2023-08-04 ENCOUNTER — HOSPITAL ENCOUNTER (OUTPATIENT)
Dept: RADIATION ONCOLOGY | Age: 88
Discharge: HOME | End: 2023-08-04
Payer: MEDICARE

## 2023-08-04 PROCEDURE — 77290 THER RAD SIMULAJ FIELD CPLX: CPT | Performed by: RADIOLOGY

## 2023-08-04 PROCEDURE — 77334 RADIATION TREATMENT AID(S): CPT | Performed by: RADIOLOGY

## 2023-08-04 NOTE — DISCHARGE INSTRUCTIONS
to a cancer tumor or to specific areas within the tumor. [address]     What to expect next! Simulation Scan      Prior to your radiation you will need a simulation CT scan. This scan is where they will precisely identify the area on your body where you will receive radiation. Positioning is extremely important, and your body will be positioned carefully. You will be in the same position during every treatment, and you will need to remain still during the treatments. Your doctor may order a mold or a mask (for head and neck treatment only) to help reproduce your treatment set up. You will also receive tattoos during this appointment. These tattoos are very important. The therapists use these tattoos to line your body up on the treatment table. Information from the CT scan is used to precisely locate the treatment fields and create a \"map\" for the physician to design the treatment. The CT scanner is specially designed to work with the other equipment in the department and is not a replacement for other diagnostic scans you may have received. After simulation, details from the procedure are forwarded to medical radiation dosimetrists and medical physicists. These professionals perform highly technical calculations that will be used to set up the treatment machine (linear accelerator). The dosimetrist and physicist work closely with your radiation oncologist to develop the treatment plan, a process that typically takes 7-10 business days. Once the planning is completed, a therapist will call you with a start date. During that call your appointment time will also be determined. Your appointment time will be at the same time each day. Head and Neck Mask                            Body Mold                        Radiation Tattoo  Daily Treatments       You will be placed on the treatment table in the same position as you were when you had your simulation scan.  Once in place,

## 2023-08-04 NOTE — PROGRESS NOTES
Pt here today for teach and simulation scan. Information provided along with additional education regarding radiation therapy and what to expect. Pt verbalizes understanding and all questions answered to the best of my knowledge. Samples of aquaphor and community resource information provided. Pt escorted to CT room without any difficulty. Site specific side effects and skin care of nose radiation treatments in 30 fractions reviewed with pt an daughter, Aggie Carmen. Patient currently lives in assisted living setting and Aggie Carmen states family will providing transportation to appointments. Patient somewhat forgetful and will likely need reinforcement of information throughout treatment.

## 2023-08-07 ENCOUNTER — TELEPHONE (OUTPATIENT)
Dept: RADIATION ONCOLOGY | Age: 88
End: 2023-08-07

## 2023-08-07 NOTE — TELEPHONE ENCOUNTER
Dr. Brando Cabrales would like to speak with pts oncologist @[facility], Margaret Petersen MD.   I made a 2nd call to her office, [phone], no answer, left a 2nd message for doctor to call back to discuss pts radiation treatment plan.

## 2023-08-16 ENCOUNTER — HOSPITAL ENCOUNTER (OUTPATIENT)
Dept: RADIATION ONCOLOGY | Age: 88
Discharge: HOME | End: 2023-08-16
Payer: MEDICARE

## 2023-08-16 PROCEDURE — 77300 RADIATION THERAPY DOSE PLAN: CPT | Performed by: RADIOLOGY

## 2023-08-16 PROCEDURE — 77295 3-D RADIOTHERAPY PLAN: CPT | Performed by: RADIOLOGY

## 2023-08-16 PROCEDURE — 77336 RADIATION PHYSICS CONSULT: CPT | Performed by: RADIOLOGY

## 2023-08-16 PROCEDURE — 77334 RADIATION TREATMENT AID(S): CPT | Performed by: RADIOLOGY

## 2023-08-22 ENCOUNTER — HOSPITAL ENCOUNTER (OUTPATIENT)
Dept: RADIATION ONCOLOGY | Age: 88
Discharge: HOME | End: 2023-08-22
Payer: MEDICARE

## 2023-08-22 PROCEDURE — 77412 RADIATION TX DELIVERY LVL 3: CPT | Performed by: RADIOLOGY

## 2023-08-22 PROCEDURE — 77280 THER RAD SIMULAJ FIELD SMPL: CPT | Performed by: RADIOLOGY

## 2023-08-23 ENCOUNTER — HOSPITAL ENCOUNTER (OUTPATIENT)
Dept: RADIATION ONCOLOGY | Age: 88
Discharge: HOME | End: 2023-08-23
Payer: MEDICARE

## 2023-08-23 VITALS
RESPIRATION RATE: 16 BRPM | SYSTOLIC BLOOD PRESSURE: 153 MMHG | OXYGEN SATURATION: 97 % | TEMPERATURE: 98.2 F | BODY MASS INDEX: 20.37 KG/M2 | WEIGHT: 122.4 LBS | HEART RATE: 55 BPM | DIASTOLIC BLOOD PRESSURE: 86 MMHG

## 2023-08-23 PROCEDURE — 77412 RADIATION TX DELIVERY LVL 3: CPT | Performed by: RADIOLOGY

## 2023-08-23 PROCEDURE — 77336 RADIATION PHYSICS CONSULT: CPT | Performed by: RADIOLOGY

## 2023-08-24 ENCOUNTER — HOSPITAL ENCOUNTER (OUTPATIENT)
Dept: RADIATION ONCOLOGY | Age: 88
Discharge: HOME | End: 2023-08-24
Payer: MEDICARE

## 2023-08-24 PROCEDURE — 77412 RADIATION TX DELIVERY LVL 3: CPT | Performed by: RADIOLOGY

## 2023-08-25 ENCOUNTER — HOSPITAL ENCOUNTER (OUTPATIENT)
Dept: RADIATION ONCOLOGY | Age: 88
Discharge: HOME | End: 2023-08-25
Payer: MEDICARE

## 2023-08-25 PROCEDURE — 77412 RADIATION TX DELIVERY LVL 3: CPT | Performed by: RADIOLOGY

## 2023-08-28 ENCOUNTER — HOSPITAL ENCOUNTER (OUTPATIENT)
Dept: RADIATION ONCOLOGY | Age: 88
Discharge: HOME | End: 2023-08-28
Payer: MEDICARE

## 2023-08-28 PROCEDURE — 77412 RADIATION TX DELIVERY LVL 3: CPT | Performed by: RADIOLOGY

## 2023-08-29 ENCOUNTER — HOSPITAL ENCOUNTER (OUTPATIENT)
Dept: RADIATION ONCOLOGY | Age: 88
Discharge: HOME | End: 2023-08-29
Payer: MEDICARE

## 2023-08-29 PROCEDURE — 77412 RADIATION TX DELIVERY LVL 3: CPT | Performed by: RADIOLOGY

## 2023-08-30 ENCOUNTER — HOSPITAL ENCOUNTER (OUTPATIENT)
Dept: RADIATION ONCOLOGY | Age: 88
Discharge: HOME | End: 2023-08-30
Payer: MEDICARE

## 2023-08-30 VITALS
TEMPERATURE: 97.4 F | DIASTOLIC BLOOD PRESSURE: 72 MMHG | WEIGHT: 125 LBS | SYSTOLIC BLOOD PRESSURE: 149 MMHG | RESPIRATION RATE: 16 BRPM | HEART RATE: 62 BPM | BODY MASS INDEX: 20.8 KG/M2

## 2023-08-30 PROCEDURE — 77412 RADIATION TX DELIVERY LVL 3: CPT | Performed by: RADIOLOGY

## 2023-08-30 PROCEDURE — 77336 RADIATION PHYSICS CONSULT: CPT | Performed by: RADIOLOGY

## 2023-08-30 NOTE — PROGRESS NOTES
ondansetron (ZOFRAN) 4 MG tablet, Take 4 mg by mouth every 8 hours as needed for Nausea or Vomiting, Disp: , Rfl:     Polyethylene Glycol 3350 (MIRALAX PO), Take by mouth, Disp: , Rfl:     levothyroxine (SYNTHROID) 100 MCG tablet, , Disp: , Rfl:     memantine (NAMENDA) 10 MG tablet, Take 1 tablet by mouth 2 times daily, Disp: 60 tablet, Rfl: 11    rosuvastatin (CRESTOR) 20 MG tablet, take 1 tablet by mouth nightly, Disp: 30 tablet, Rfl: 3    aspirin 81 MG EC tablet, take 1 tablet by mouth once daily, Disp: 30 tablet, Rfl: 3    pantoprazole (PROTONIX) 40 MG tablet, take 1 tablet by mouth once daily, Disp: 30 tablet, Rfl: 11    CREON 23439-07557 units delayed release capsule, take 1 capsule by mouth three times a day with meals (Patient taking differently: Take 3,000 Units by mouth 3 times daily (with meals) ), Disp: 270 capsule, Rfl: 3    acetaminophen (TYLENOL) 500 MG tablet, Take 1,000 mg by mouth as needed, Disp: , Rfl:     magnesium oxide (MAG-OX) 400 MG tablet, Take 400 mg by mouth 2 times daily, Disp: , Rfl:     levothyroxine (SYNTHROID) 75 MCG tablet, take 1 tablet by mouth once daily (Patient taking differently: Take 100 mcg by mouth Daily ), Disp: 30 tablet, Rfl: 3    vitamin D (ERGOCALCIFEROL) 1.25 MG (66098 UT) CAPS capsule, Take 1 capsule by mouth once a week (Patient taking differently: Take 5,000 Units by mouth daily ), Disp: 12 capsule, Rfl: 3    apixaban (ELIQUIS) 2.5 MG TABS tablet, Take 1 tablet by mouth 2 times daily (Patient not taking: Reported on 10/20/2021), Disp: 60 tablet, Rfl: 3    amLODIPine (NORVASC) 2.5 MG tablet, Take 1 tablet by mouth daily, Disp: 30 tablet, Rfl: 3    Cyanocobalamin (VITAMIN B-12 PO), Take by mouth daily (Patient not taking: Reported on 10/20/2021), Disp: , Rfl:     simvastatin (ZOCOR) 10 MG tablet, take 1 tablet by mouth every evening (Patient not taking: Reported on 10/20/2021), Disp: 90 tablet, Rfl: 3    oxybutynin (DITROPAN XL) 10 MG extended release tablet, Take 1

## 2023-08-31 ENCOUNTER — HOSPITAL ENCOUNTER (OUTPATIENT)
Dept: RADIATION ONCOLOGY | Age: 88
Discharge: HOME | End: 2023-08-31
Payer: MEDICARE

## 2023-08-31 PROCEDURE — 77412 RADIATION TX DELIVERY LVL 3: CPT | Performed by: RADIOLOGY

## 2023-09-01 ENCOUNTER — HOSPITAL ENCOUNTER (OUTPATIENT)
Dept: RADIATION ONCOLOGY | Age: 88
Discharge: HOME | End: 2023-09-01

## 2023-09-05 ENCOUNTER — HOSPITAL ENCOUNTER (OUTPATIENT)
Dept: RADIATION ONCOLOGY | Age: 88
Discharge: HOME | End: 2023-09-05
Payer: MEDICARE

## 2023-09-05 PROCEDURE — 77412 RADIATION TX DELIVERY LVL 3: CPT | Performed by: RADIOLOGY

## 2023-09-06 ENCOUNTER — HOSPITAL ENCOUNTER (OUTPATIENT)
Dept: RADIATION ONCOLOGY | Age: 88
Discharge: HOME | End: 2023-09-06
Payer: MEDICARE

## 2023-09-06 VITALS
HEART RATE: 60 BPM | DIASTOLIC BLOOD PRESSURE: 74 MMHG | SYSTOLIC BLOOD PRESSURE: 150 MMHG | WEIGHT: 127.2 LBS | RESPIRATION RATE: 16 BRPM | BODY MASS INDEX: 21.17 KG/M2 | TEMPERATURE: 97 F

## 2023-09-06 PROCEDURE — 77412 RADIATION TX DELIVERY LVL 3: CPT | Performed by: RADIOLOGY

## 2023-09-06 PROCEDURE — 77336 RADIATION PHYSICS CONSULT: CPT | Performed by: RADIOLOGY

## 2023-09-06 ASSESSMENT — PAIN SCALES - GENERAL: PAINLEVEL_OUTOF10: 0

## 2023-09-07 ENCOUNTER — HOSPITAL ENCOUNTER (OUTPATIENT)
Dept: RADIATION ONCOLOGY | Age: 88
Discharge: HOME | End: 2023-09-07
Payer: MEDICARE

## 2023-09-07 PROCEDURE — 77412 RADIATION TX DELIVERY LVL 3: CPT | Performed by: RADIOLOGY

## 2023-09-08 ENCOUNTER — HOSPITAL ENCOUNTER (OUTPATIENT)
Dept: RADIATION ONCOLOGY | Age: 88
Discharge: HOME | End: 2023-09-08
Payer: MEDICARE

## 2023-09-08 PROCEDURE — 77412 RADIATION TX DELIVERY LVL 3: CPT | Performed by: RADIOLOGY

## 2023-09-11 ENCOUNTER — HOSPITAL ENCOUNTER (OUTPATIENT)
Dept: RADIATION ONCOLOGY | Age: 88
Discharge: HOME | End: 2023-09-11
Payer: MEDICARE

## 2023-09-11 PROCEDURE — 77412 RADIATION TX DELIVERY LVL 3: CPT | Performed by: RADIOLOGY

## 2023-09-12 ENCOUNTER — TELEPHONE (OUTPATIENT)
Dept: RADIATION ONCOLOGY | Age: 88
End: 2023-09-12

## 2023-09-12 ENCOUNTER — APPOINTMENT (OUTPATIENT)
Dept: RADIATION ONCOLOGY | Age: 88
End: 2023-09-12
Payer: MEDICARE

## 2023-09-12 NOTE — TELEPHONE ENCOUNTER
From: Piper Karin  To: Caitlyn Taylor DO  Sent: 11/19/2020 1:39 PM EST  Subject: Non-Urgent Medical Question    I've been dizzy lately in the morning. This has been happening for some time. I have mecluzine 25 mg from a previous Dr. Estelita Bolden got them last November 2019. Is it ok to take one? Patient/Caregiver provided printed discharge information.

## 2023-09-12 NOTE — TELEPHONE ENCOUNTER
Pts daughter called to cancel pts radiation therapy today. Pt had a procedure @[facility] today that is taking longer than they thought, therefore, they will not be back for pts RT appt.

## 2023-09-13 ENCOUNTER — HOSPITAL ENCOUNTER (OUTPATIENT)
Dept: RADIATION ONCOLOGY | Age: 88
Discharge: HOME | End: 2023-09-13
Payer: MEDICARE

## 2023-09-13 VITALS
OXYGEN SATURATION: 97 % | TEMPERATURE: 97.6 F | RESPIRATION RATE: 16 BRPM | HEART RATE: 56 BPM | DIASTOLIC BLOOD PRESSURE: 80 MMHG | SYSTOLIC BLOOD PRESSURE: 174 MMHG | WEIGHT: 126.8 LBS | BODY MASS INDEX: 21.1 KG/M2

## 2023-09-13 PROCEDURE — 77412 RADIATION TX DELIVERY LVL 3: CPT | Performed by: RADIOLOGY

## 2023-09-13 NOTE — PROGRESS NOTES
[address]       Radiation Oncology          [facility], [facility]: [phone]        F: [phone]       [email or web address]             RADIATION ONCOLOGY WEEKLY PROGRESS NOTE  Patient ID:   Fox Macias  : 1935   MRN: [id number]    Location:  [address] [place] [address]., [address]   [phone]    DIAGNOSIS:  Fox Macias is a 82 y.o. 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 She was seen at [address] [place] [address] and no further surgery was recommended. Per [address] [place] [address] tumor board, recommendation was for radiation therapy to the area of recurrence. TREATMENT DETAILS:  Treatment Site: R Nasal sidewall  Actual Dose: 3000cGy  Total Planned Dose: 6000cGy  Treatment Technique: en face Electron  Fraction Technique: Daily  Therapy imaging monitoring: CBCT daily  Concurrent Chemotherapy: NA    SUBJECTIVE:   Patient seen for their weekly on treatment evaluation today. Doing well. No skin breakdown in the treated area. Denies pain. OBJECTIVE:     ECO Symptomatic but completely ambulatory    VITAL SIGNS: There were no vitals taken for this visit. Wt Readings from Last 5 Encounters:   23 127 lb 3.2 oz (57.7 kg)   23 125 lb (56.7 kg)   23 122 lb 6.4 oz (55.5 kg)   23 124 lb 12.8 oz (56.6 kg)   22 126 lb (57.2 kg)     GENERAL:  General appearance is that of a well-nourished, well-developed in no apparent distress. HEART:  Normal rate and regular rhythm  LUNGS:  Pulmonary effort normal.  ABDOMEN:  Soft, nontender, non distended  EXTREMITIES:  No edema. No calf tenderness.   MSK:  No spinal

## 2023-09-14 ENCOUNTER — HOSPITAL ENCOUNTER (OUTPATIENT)
Dept: RADIATION ONCOLOGY | Age: 88
Discharge: HOME | End: 2023-09-14
Payer: MEDICARE

## 2023-09-14 PROCEDURE — 77412 RADIATION TX DELIVERY LVL 3: CPT | Performed by: RADIOLOGY

## 2023-09-15 ENCOUNTER — APPOINTMENT (OUTPATIENT)
Dept: RADIATION ONCOLOGY | Age: 88
End: 2023-09-15
Payer: MEDICARE

## 2023-09-18 ENCOUNTER — APPOINTMENT (OUTPATIENT)
Dept: RADIATION ONCOLOGY | Age: 88
End: 2023-09-18
Payer: MEDICARE

## 2023-09-19 ENCOUNTER — HOSPITAL ENCOUNTER (OUTPATIENT)
Dept: RADIATION ONCOLOGY | Age: 88
Discharge: HOME | End: 2023-09-19
Payer: MEDICARE

## 2023-09-19 PROCEDURE — 77412 RADIATION TX DELIVERY LVL 3: CPT | Performed by: RADIOLOGY

## 2023-09-20 ENCOUNTER — HOSPITAL ENCOUNTER (OUTPATIENT)
Dept: RADIATION ONCOLOGY | Age: 88
Discharge: HOME | End: 2023-09-20
Payer: MEDICARE

## 2023-09-20 VITALS
DIASTOLIC BLOOD PRESSURE: 75 MMHG | RESPIRATION RATE: 16 BRPM | HEART RATE: 56 BPM | OXYGEN SATURATION: 96 % | TEMPERATURE: 98.2 F | WEIGHT: 128.2 LBS | BODY MASS INDEX: 21.33 KG/M2 | SYSTOLIC BLOOD PRESSURE: 156 MMHG

## 2023-09-20 PROCEDURE — 77336 RADIATION PHYSICS CONSULT: CPT | Performed by: RADIOLOGY

## 2023-09-20 PROCEDURE — 77412 RADIATION TX DELIVERY LVL 3: CPT | Performed by: RADIOLOGY

## 2023-09-21 ENCOUNTER — HOSPITAL ENCOUNTER (OUTPATIENT)
Dept: RADIATION ONCOLOGY | Age: 88
Discharge: HOME | End: 2023-09-21
Payer: MEDICARE

## 2023-09-21 PROCEDURE — 77412 RADIATION TX DELIVERY LVL 3: CPT | Performed by: RADIOLOGY

## 2023-09-22 ENCOUNTER — HOSPITAL ENCOUNTER (OUTPATIENT)
Dept: RADIATION ONCOLOGY | Age: 88
Discharge: HOME | End: 2023-09-22
Payer: MEDICARE

## 2023-09-22 PROCEDURE — 77412 RADIATION TX DELIVERY LVL 3: CPT | Performed by: RADIOLOGY

## 2023-09-25 ENCOUNTER — HOSPITAL ENCOUNTER (OUTPATIENT)
Dept: RADIATION ONCOLOGY | Age: 88
Discharge: HOME | End: 2023-09-25
Payer: MEDICARE

## 2023-09-25 PROCEDURE — 77412 RADIATION TX DELIVERY LVL 3: CPT | Performed by: RADIOLOGY

## 2023-09-26 ENCOUNTER — HOSPITAL ENCOUNTER (OUTPATIENT)
Dept: RADIATION ONCOLOGY | Age: 88
Discharge: HOME | End: 2023-09-26
Payer: MEDICARE

## 2023-09-26 PROCEDURE — 77412 RADIATION TX DELIVERY LVL 3: CPT | Performed by: RADIOLOGY

## 2023-09-27 PROCEDURE — 77336 RADIATION PHYSICS CONSULT: CPT | Performed by: RADIOLOGY

## 2023-09-28 ENCOUNTER — HOSPITAL ENCOUNTER (OUTPATIENT)
Dept: RADIATION ONCOLOGY | Age: 88
Discharge: HOME | End: 2023-09-28
Payer: MEDICARE

## 2023-09-28 VITALS
BODY MASS INDEX: 20.8 KG/M2 | RESPIRATION RATE: 16 BRPM | WEIGHT: 125 LBS | DIASTOLIC BLOOD PRESSURE: 77 MMHG | TEMPERATURE: 98 F | SYSTOLIC BLOOD PRESSURE: 156 MMHG | OXYGEN SATURATION: 99 % | HEART RATE: 53 BPM

## 2023-09-28 PROCEDURE — 77412 RADIATION TX DELIVERY LVL 3: CPT | Performed by: RADIOLOGY

## 2023-09-28 RX ORDER — CARBOXYMETHYLCELLULOSE SODIUM 5 MG/ML
1 SOLUTION/ DROPS OPHTHALMIC 3 TIMES DAILY
Qty: 6 EACH | Refills: 0 | Status: SHIPPED | OUTPATIENT
Start: 2023-09-28 | End: 2023-10-12

## 2023-09-29 ENCOUNTER — HOSPITAL ENCOUNTER (OUTPATIENT)
Dept: RADIATION ONCOLOGY | Age: 88
Discharge: HOME | End: 2023-09-29
Payer: MEDICARE

## 2023-09-29 PROCEDURE — 77412 RADIATION TX DELIVERY LVL 3: CPT | Performed by: RADIOLOGY

## 2023-10-02 ENCOUNTER — APPOINTMENT (OUTPATIENT)
Dept: RADIATION ONCOLOGY | Age: 88
End: 2023-10-02
Payer: MEDICARE

## 2023-10-03 ENCOUNTER — HOSPITAL ENCOUNTER (OUTPATIENT)
Dept: RADIATION ONCOLOGY | Age: 88
Discharge: HOME | End: 2023-10-03
Payer: MEDICARE

## 2023-10-03 PROCEDURE — 77412 RADIATION TX DELIVERY LVL 3: CPT | Performed by: RADIOLOGY

## 2023-10-04 ENCOUNTER — HOSPITAL ENCOUNTER (OUTPATIENT)
Dept: RADIATION ONCOLOGY | Age: 88
Discharge: HOME | End: 2023-10-04
Payer: MEDICARE

## 2023-10-04 VITALS
DIASTOLIC BLOOD PRESSURE: 70 MMHG | OXYGEN SATURATION: 100 % | WEIGHT: 127.6 LBS | SYSTOLIC BLOOD PRESSURE: 185 MMHG | BODY MASS INDEX: 21.23 KG/M2 | TEMPERATURE: 98 F | HEART RATE: 55 BPM | RESPIRATION RATE: 16 BRPM

## 2023-10-04 PROCEDURE — 77412 RADIATION TX DELIVERY LVL 3: CPT | Performed by: RADIOLOGY

## 2023-10-05 ENCOUNTER — HOSPITAL ENCOUNTER (OUTPATIENT)
Dept: RADIATION ONCOLOGY | Age: 88
Discharge: HOME | End: 2023-10-05
Payer: MEDICARE

## 2023-10-05 PROCEDURE — 77412 RADIATION TX DELIVERY LVL 3: CPT | Performed by: RADIOLOGY

## 2023-10-06 ENCOUNTER — HOSPITAL ENCOUNTER (OUTPATIENT)
Dept: RADIATION ONCOLOGY | Age: 88
Discharge: HOME | End: 2023-10-06
Payer: MEDICARE

## 2023-10-06 PROCEDURE — 77412 RADIATION TX DELIVERY LVL 3: CPT | Performed by: RADIOLOGY

## 2023-10-10 ENCOUNTER — HOSPITAL ENCOUNTER (OUTPATIENT)
Dept: RADIATION ONCOLOGY | Age: 88
Discharge: HOME | End: 2023-10-10
Payer: MEDICARE

## 2023-10-10 PROCEDURE — 77412 RADIATION TX DELIVERY LVL 3: CPT | Performed by: RADIOLOGY

## 2023-10-11 ENCOUNTER — HOSPITAL ENCOUNTER (OUTPATIENT)
Dept: RADIATION ONCOLOGY | Age: 88
Discharge: HOME | End: 2023-10-11
Payer: MEDICARE

## 2023-10-11 VITALS
HEART RATE: 52 BPM | SYSTOLIC BLOOD PRESSURE: 181 MMHG | BODY MASS INDEX: 21.47 KG/M2 | RESPIRATION RATE: 16 BRPM | OXYGEN SATURATION: 99 % | WEIGHT: 129 LBS | DIASTOLIC BLOOD PRESSURE: 92 MMHG | TEMPERATURE: 98.2 F

## 2023-10-11 PROCEDURE — 77412 RADIATION TX DELIVERY LVL 3: CPT | Performed by: RADIOLOGY

## 2023-10-13 ENCOUNTER — CLINICAL DOCUMENTATION (OUTPATIENT)
Dept: RADIATION ONCOLOGY | Age: 88
End: 2023-10-13

## 2023-10-13 NOTE — PROGRESS NOTES
[facility]            Radiation Oncology          [address]        Emelyn Peterson: [phone]        F: [phone]       [email or web address]           Dear Dr Pinon ref. provider found: Thank you for referring Ashley Heart to me for evaluation and treatment. Below is a summary of the patient's recently completed radiation course. If you have questions, please do not hesitate to call me. I look forward to following this patient along with you. Sincerely,  Electronically signed by Carlos Dumont MD on 10/13/2023 at 2:45 PM EDT      CC: Patient Care Team:  Leopoldo Second, APRN - CNP as PCP - General (Family Medicine)  ------------------------------------------------------------------------------------------------------------------------------------------------------------------------------------------        Date of Service: 10/13/2023     Location:  [address] [place] [address]   [phone]        RADIATION ONCOLOGY END OF TREATMENT SUMMARY:    Patient ID:   Ashley Heart  : 1935   MRN: [id number]    DIAGNOSIS:  Ashley Heart is a 82 y.o. 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 She was seen at [address] [place] [address] and no further surgery was recommended. Per [address] [place] [address] tumor board, recommendation was for radiation therapy to the area of recurrence. TREATMENT DETAILS:          Concurrent Chemotherapy: None    CLINICAL COURSE:    ECOG 1    Patient completed her radiation therapy as prescribed. She experienced fatigue, grade 1 radiation dermatitis and symptoms were managed with supportive care.   She is to come back for a

## 2023-11-28 ENCOUNTER — HOSPITAL ENCOUNTER (OUTPATIENT)
Dept: RADIATION ONCOLOGY | Age: 88
Discharge: HOME | End: 2023-11-28
Payer: MEDICARE

## 2023-11-28 VITALS
DIASTOLIC BLOOD PRESSURE: 84 MMHG | SYSTOLIC BLOOD PRESSURE: 180 MMHG | WEIGHT: 124.4 LBS | TEMPERATURE: 98 F | RESPIRATION RATE: 16 BRPM | HEART RATE: 48 BPM | BODY MASS INDEX: 20.7 KG/M2 | OXYGEN SATURATION: 97 %

## 2023-11-28 PROCEDURE — 99212 OFFICE O/P EST SF 10 MIN: CPT | Performed by: RADIOLOGY

## 2023-11-28 NOTE — PROGRESS NOTES
[facility]            Radiation Oncology          [facility], [facility]  Yevgeniy Wang Cough: [phone]        F: [phone]       [email or web address]           Date of Service: 2023     [facility]  [address] [facility] [place], [address] [place] [address]   [phone]       RADIATION ONCOLOGY FOLLOW UP NOTE    Patient ID:   Tono Gonzalez  : 1935   MRN: [id number]    DIAGNOSIS:  Nasal dermal pleomorphic sarcoma      INTERVAL HISTORY:   Tono Gonzalez is a 82 y.o. 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 She was seen at [address] [place] [address] and no further surgery was recommended. Per [address] [place] [address] tumor board, recommendation was for radiation therapy to the area of recurrence. Patient completed a course of radiation therapy to a dose of 6000 cGy on 10/11/2023. She presents today for her first posttreatment follow-up. She is doing well posttreatment. She denies pain in the treated area. She reports eye dryness in her nose and uses saline spray as needed. She denies changes in her breathing. She was Recently found to have a lesion in her left arm related to squamous cell cancer, pending excision. MEDICATIONS:    Current Outpatient Medications:     betamethasone valerate (VALISONE) 0.1 % cream, Apply topically 2 times daily. , Disp: 45 g, Rfl: 2    betamethasone valerate (VALISONE) 0.1 % cream, Apply topically 2 times daily. , Disp: 45 g, Rfl: 2    hydrocortisone (PROCTO-MED HC) 2.5 % CREA rectal cream, insert 1 applicatorful rectally four times a day if needed for HEMORRHOIDS, Disp: 56 g, Rfl: 5    DULoxetine (CYMBALTA) 30 MG extended release capsule, Take 30 mg

## 2023-12-27 NOTE — TELEPHONE ENCOUNTER
Katia Gonzalez is calling to request a refill on the following medication(s):    Last Visit Date (If Applicable):  16/60/1522    Next Visit Date:    12/7/2020    Medication Request:  Requested Prescriptions     Pending Prescriptions Disp Refills    simvastatin (ZOCOR) 10 MG tablet [Pharmacy Med Name: SIMVASTATIN 10 MG TABLET] 30 tablet 3     Sig: take 1 tablet by mouth nightly
yes

## 2024-01-08 RX ORDER — HYDROCORTISONE 25 MG/G
CREAM TOPICAL
Qty: 56 G | Refills: 5 | OUTPATIENT
Start: 2024-01-08

## 2024-03-24 ENCOUNTER — APPOINTMENT (OUTPATIENT)
Dept: CT IMAGING | Age: 89
DRG: 315 | End: 2024-03-24
Payer: MEDICARE

## 2024-03-24 ENCOUNTER — HOSPITAL ENCOUNTER (INPATIENT)
Age: 89
LOS: 1 days | Discharge: HOME | DRG: 315 | End: 2024-03-25
Attending: EMERGENCY MEDICINE | Admitting: INTERNAL MEDICINE
Payer: MEDICARE

## 2024-03-24 ENCOUNTER — APPOINTMENT (OUTPATIENT)
Dept: GENERAL RADIOLOGY | Age: 89
DRG: 315 | End: 2024-03-24
Attending: EMERGENCY MEDICINE
Payer: MEDICARE

## 2024-03-24 DIAGNOSIS — R41.89 COGNITIVE IMPAIRMENT: ICD-10-CM
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
## 2024-03-24 DIAGNOSIS — R94.31 ACUTE ELECTROCARDIOGRAM CHANGES: ICD-10-CM

## 2024-03-24 DIAGNOSIS — R79.89 ELEVATED TROPONIN: ICD-10-CM
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
## 2024-03-24 LAB
ALBUMIN SERPL-MCNC: 4 G/DL (ref 3.5–5.2)
ALP SERPL-CCNC: 107 U/L (ref 35–104)
ALT SERPL-CCNC: 92 U/L (ref 5–33)
ANION GAP SERPL CALCULATED.3IONS-SCNC: 10 MMOL/L (ref 9–17)
AST SERPL-CCNC: 101 U/L
BACTERIA URNS QL MICRO: ABNORMAL
BASOPHILS # BLD: 0.1 K/UL (ref 0–0.2)
BASOPHILS NFR BLD: 1 % (ref 0–2)
BILIRUB SERPL-MCNC: 0.3 MG/DL (ref 0.3–1.2)
BILIRUB UR QL STRIP: NEGATIVE
BUN SERPL-MCNC: 12 MG/DL (ref 8–23)
CALCIUM SERPL-MCNC: 8.9 MG/DL (ref 8.6–10.4)
CASTS #/AREA URNS LPF: ABNORMAL /LPF
CHLORIDE SERPL-SCNC: 101 MMOL/L (ref 98–107)
CLARITY UR: CLEAR
CO2 SERPL-SCNC: 26 MMOL/L (ref 20–31)
COLOR UR: YELLOW
CREAT SERPL-MCNC: 1.3 MG/DL (ref 0.5–0.9)
EOSINOPHIL # BLD: 0.2 K/UL (ref 0–0.4)
EOSINOPHILS RELATIVE PERCENT: 3 % (ref 0–4)
EPI CELLS #/AREA URNS HPF: ABNORMAL /HPF
ERYTHROCYTE [DISTWIDTH] IN BLOOD BY AUTOMATED COUNT: 16.6 % (ref 11.5–14.9)
GFR SERPL CREATININE-BSD FRML MDRD: 40 ML/MIN/1.73M2
GLUCOSE SERPL-MCNC: 86 MG/DL (ref 70–99)
GLUCOSE UR STRIP-MCNC: NEGATIVE MG/DL
HCT VFR BLD AUTO: 38.4 % (ref 36–46)
HGB BLD-MCNC: 13 G/DL (ref 12–16)
HGB UR QL STRIP.AUTO: NEGATIVE
INR PPP: 1
KETONES UR STRIP-MCNC: NEGATIVE MG/DL
LEUKOCYTE ESTERASE UR QL STRIP: ABNORMAL
LYMPHOCYTES NFR BLD: 1.5 K/UL (ref 1–4.8)
LYMPHOCYTES RELATIVE PERCENT: 24 % (ref 24–44)
MCH RBC QN AUTO: 33.6 PG (ref 26–34)
MCHC RBC AUTO-ENTMCNC: 33.8 G/DL (ref 31–37)
MCV RBC AUTO: 99.2 FL (ref 80–100)
MONOCYTES NFR BLD: 0.6 K/UL (ref 0.1–1.3)
MONOCYTES NFR BLD: 10 % (ref 1–7)
NEUTROPHILS NFR BLD: 62 % (ref 36–66)
NEUTS SEG NFR BLD: 3.8 K/UL (ref 1.3–9.1)
NITRITE UR QL STRIP: NEGATIVE
PH UR STRIP: 8.5 [PH] (ref 5–8)
PLATELET # BLD AUTO: 202 K/UL (ref 150–450)
PMV BLD AUTO: 7.3 FL (ref 6–12)
POTASSIUM SERPL-SCNC: 4.1 MMOL/L (ref 3.7–5.3)
PROT SERPL-MCNC: 6.7 G/DL (ref 6.4–8.3)
PROT UR STRIP-MCNC: NEGATIVE MG/DL
PROTHROMBIN TIME: 13.2 SEC (ref 11.8–14.6)
RBC # BLD AUTO: 3.86 M/UL (ref 4–5.2)
RBC #/AREA URNS HPF: ABNORMAL /HPF
SODIUM SERPL-SCNC: 137 MMOL/L (ref 135–144)
SP GR UR STRIP: 1.01 (ref 1–1.03)
TROPONIN I SERPL HS-MCNC: 15 NG/L (ref 0–14)
TROPONIN I SERPL HS-MCNC: 16 NG/L (ref 0–14)
UROBILINOGEN UR STRIP-ACNC: NORMAL EU/DL (ref 0–1)
WBC #/AREA URNS HPF: ABNORMAL /HPF
WBC OTHER # BLD: 6 K/UL (ref 3.5–11)

## 2024-03-24 PROCEDURE — 80053 COMPREHEN METABOLIC PANEL: CPT

## 2024-03-24 PROCEDURE — 36415 COLL VENOUS BLD VENIPUNCTURE: CPT

## 2024-03-24 PROCEDURE — 2580000003 HC RX 258

## 2024-03-24 PROCEDURE — 72131 CT LUMBAR SPINE W/O DYE: CPT

## 2024-03-24 PROCEDURE — 93005 ELECTROCARDIOGRAM TRACING: CPT | Performed by: EMERGENCY MEDICINE

## 2024-03-24 PROCEDURE — 71045 X-RAY EXAM CHEST 1 VIEW: CPT

## 2024-03-24 PROCEDURE — 6360000002 HC RX W HCPCS

## 2024-03-24 PROCEDURE — 6370000000 HC RX 637 (ALT 250 FOR IP): Performed by: EMERGENCY MEDICINE

## 2024-03-24 PROCEDURE — 81001 URINALYSIS AUTO W/SCOPE: CPT

## 2024-03-24 PROCEDURE — 6370000000 HC RX 637 (ALT 250 FOR IP)

## 2024-03-24 PROCEDURE — 99285 EMERGENCY DEPT VISIT HI MDM: CPT

## 2024-03-24 PROCEDURE — 2060000000 HC ICU INTERMEDIATE R&B

## 2024-03-24 PROCEDURE — 72125 CT NECK SPINE W/O DYE: CPT

## 2024-03-24 PROCEDURE — 99223 1ST HOSP IP/OBS HIGH 75: CPT | Performed by: INTERNAL MEDICINE

## 2024-03-24 PROCEDURE — 70450 CT HEAD/BRAIN W/O DYE: CPT

## 2024-03-24 PROCEDURE — 85025 COMPLETE CBC W/AUTO DIFF WBC: CPT

## 2024-03-24 PROCEDURE — 85610 PROTHROMBIN TIME: CPT

## 2024-03-24 PROCEDURE — 6360000002 HC RX W HCPCS: Performed by: NURSE PRACTITIONER

## 2024-03-24 PROCEDURE — 6370000000 HC RX 637 (ALT 250 FOR IP): Performed by: INTERNAL MEDICINE

## 2024-03-24 PROCEDURE — 84484 ASSAY OF TROPONIN QUANT: CPT

## 2024-03-24 RX ORDER — HYDRALAZINE HYDROCHLORIDE 20 MG/ML
10 INJECTION INTRAMUSCULAR; INTRAVENOUS ONCE
Status: COMPLETED | OUTPATIENT
Start: 2024-03-24 | End: 2024-03-24

## 2024-03-24 RX ORDER — LEVOTHYROXINE SODIUM 0.1 MG/1
100 TABLET ORAL
Status: DISCONTINUED | OUTPATIENT
Start: 2024-03-25 | End: 2024-03-25

## 2024-03-24 RX ORDER — ASPIRIN 81 MG/1
81 TABLET ORAL DAILY
Status: DISCONTINUED | OUTPATIENT
Start: 2024-03-24 | End: 2024-03-25 | Stop reason: HOSPADM

## 2024-03-24 RX ORDER — SODIUM CHLORIDE 9 MG/ML
INJECTION, SOLUTION INTRAVENOUS CONTINUOUS
Status: DISCONTINUED | OUTPATIENT
Start: 2024-03-24 | End: 2024-03-25 | Stop reason: HOSPADM

## 2024-03-24 RX ORDER — ACETAMINOPHEN 650 MG/1
650 SUPPOSITORY RECTAL EVERY 6 HOURS PRN
Status: DISCONTINUED | OUTPATIENT
Start: 2024-03-24 | End: 2024-03-25 | Stop reason: HOSPADM

## 2024-03-24 RX ORDER — MEMANTINE HYDROCHLORIDE 10 MG/1
5 TABLET ORAL 2 TIMES DAILY
Status: DISCONTINUED | OUTPATIENT
Start: 2024-03-24 | End: 2024-03-25 | Stop reason: HOSPADM

## 2024-03-24 RX ORDER — ONDANSETRON 2 MG/ML
4 INJECTION INTRAMUSCULAR; INTRAVENOUS EVERY 6 HOURS PRN
Status: DISCONTINUED | OUTPATIENT
Start: 2024-03-24 | End: 2024-03-25 | Stop reason: HOSPADM

## 2024-03-24 RX ORDER — SODIUM CHLORIDE 0.9 % (FLUSH) 0.9 %
5-40 SYRINGE (ML) INJECTION EVERY 12 HOURS SCHEDULED
Status: DISCONTINUED | OUTPATIENT
Start: 2024-03-24 | End: 2024-03-25 | Stop reason: HOSPADM

## 2024-03-24 RX ORDER — DOCUSATE SODIUM 100 MG/1
100 CAPSULE, LIQUID FILLED ORAL 2 TIMES DAILY
Status: DISCONTINUED | OUTPATIENT
Start: 2024-03-24 | End: 2024-03-25 | Stop reason: HOSPADM

## 2024-03-24 RX ORDER — DULOXETIN HYDROCHLORIDE 30 MG/1
30 CAPSULE, DELAYED RELEASE ORAL DAILY
Status: DISCONTINUED | OUTPATIENT
Start: 2024-03-24 | End: 2024-03-25 | Stop reason: HOSPADM

## 2024-03-24 RX ORDER — POLYETHYLENE GLYCOL 3350 17 G/17G
17 POWDER, FOR SOLUTION ORAL DAILY PRN
Status: DISCONTINUED | OUTPATIENT
Start: 2024-03-24 | End: 2024-03-25 | Stop reason: HOSPADM

## 2024-03-24 RX ORDER — MEMANTINE HYDROCHLORIDE 10 MG/1
10 TABLET ORAL 2 TIMES DAILY
Status: DISCONTINUED | OUTPATIENT
Start: 2024-03-24 | End: 2024-03-24

## 2024-03-24 RX ORDER — SODIUM CHLORIDE 9 MG/ML
INJECTION, SOLUTION INTRAVENOUS PRN
Status: DISCONTINUED | OUTPATIENT
Start: 2024-03-24 | End: 2024-03-25 | Stop reason: HOSPADM

## 2024-03-24 RX ORDER — ROSUVASTATIN CALCIUM 20 MG/1
10 TABLET, COATED ORAL NIGHTLY
Status: DISCONTINUED | OUTPATIENT
Start: 2024-03-24 | End: 2024-03-25 | Stop reason: HOSPADM

## 2024-03-24 RX ORDER — ONDANSETRON 4 MG/1
4 TABLET, ORALLY DISINTEGRATING ORAL EVERY 8 HOURS PRN
Status: DISCONTINUED | OUTPATIENT
Start: 2024-03-24 | End: 2024-03-25 | Stop reason: HOSPADM

## 2024-03-24 RX ORDER — ASPIRIN 325 MG
325 TABLET ORAL ONCE
Status: COMPLETED | OUTPATIENT
Start: 2024-03-24 | End: 2024-03-24

## 2024-03-24 RX ORDER — ACETAMINOPHEN 325 MG/1
650 TABLET ORAL EVERY 6 HOURS PRN
Status: DISCONTINUED | OUTPATIENT
Start: 2024-03-24 | End: 2024-03-25 | Stop reason: HOSPADM

## 2024-03-24 RX ORDER — AMLODIPINE BESYLATE 2.5 MG/1
2.5 TABLET ORAL DAILY
Status: DISCONTINUED | OUTPATIENT
Start: 2024-03-24 | End: 2024-03-25 | Stop reason: HOSPADM

## 2024-03-24 RX ORDER — HEPARIN SODIUM 5000 [USP'U]/ML
5000 INJECTION, SOLUTION INTRAVENOUS; SUBCUTANEOUS EVERY 8 HOURS SCHEDULED
Status: DISCONTINUED | OUTPATIENT
Start: 2024-03-24 | End: 2024-03-25 | Stop reason: HOSPADM

## 2024-03-24 RX ORDER — PANTOPRAZOLE SODIUM 40 MG/1
40 TABLET, DELAYED RELEASE ORAL DAILY
Status: DISCONTINUED | OUTPATIENT
Start: 2024-03-24 | End: 2024-03-25 | Stop reason: HOSPADM

## 2024-03-24 RX ORDER — SODIUM CHLORIDE 0.9 % (FLUSH) 0.9 %
5-40 SYRINGE (ML) INJECTION PRN
Status: DISCONTINUED | OUTPATIENT
Start: 2024-03-24 | End: 2024-03-25 | Stop reason: HOSPADM

## 2024-03-24 RX ADMIN — DULOXETINE HYDROCHLORIDE 30 MG: 30 CAPSULE, DELAYED RELEASE ORAL at 14:58

## 2024-03-24 RX ADMIN — HEPARIN SODIUM 5000 UNITS: 5000 INJECTION INTRAVENOUS; SUBCUTANEOUS at 21:37

## 2024-03-24 RX ADMIN — HYDRALAZINE HYDROCHLORIDE 10 MG: 20 INJECTION, SOLUTION INTRAMUSCULAR; INTRAVENOUS at 22:36

## 2024-03-24 RX ADMIN — DOCUSATE SODIUM 100 MG: 100 CAPSULE, LIQUID FILLED ORAL at 15:06

## 2024-03-24 RX ADMIN — MEMANTINE 5 MG: 10 TABLET ORAL at 15:06

## 2024-03-24 RX ADMIN — SODIUM CHLORIDE: 9 INJECTION, SOLUTION INTRAVENOUS at 15:05

## 2024-03-24 RX ADMIN — PANTOPRAZOLE SODIUM 40 MG: 40 TABLET, DELAYED RELEASE ORAL at 14:58

## 2024-03-24 RX ADMIN — HEPARIN SODIUM 5000 UNITS: 5000 INJECTION INTRAVENOUS; SUBCUTANEOUS at 15:06

## 2024-03-24 RX ADMIN — DOCUSATE SODIUM 100 MG: 100 CAPSULE, LIQUID FILLED ORAL at 21:36

## 2024-03-24 RX ADMIN — MEMANTINE 5 MG: 10 TABLET ORAL at 21:37

## 2024-03-24 RX ADMIN — ASPIRIN 325 MG: 325 TABLET ORAL at 12:03

## 2024-03-24 RX ADMIN — ROSUVASTATIN CALCIUM 10 MG: 20 TABLET, FILM COATED ORAL at 21:36

## 2024-03-24 RX ADMIN — AMLODIPINE BESYLATE 2.5 MG: 2.5 TABLET ORAL at 14:57

## 2024-03-24 ASSESSMENT — PAIN DESCRIPTION - PAIN TYPE: TYPE: ACUTE PAIN

## 2024-03-24 ASSESSMENT — PAIN SCALES - GENERAL
PAINLEVEL_OUTOF10: 3
PAINLEVEL_OUTOF10: 0

## 2024-03-24 ASSESSMENT — PAIN DESCRIPTION - DESCRIPTORS: DESCRIPTORS: OTHER (COMMENT)

## 2024-03-24 ASSESSMENT — PAIN - FUNCTIONAL ASSESSMENT: PAIN_FUNCTIONAL_ASSESSMENT: 0-10

## 2024-03-24 ASSESSMENT — PAIN DESCRIPTION - LOCATION: LOCATION: HEAD

## 2024-03-24 ASSESSMENT — PAIN DESCRIPTION - FREQUENCY: FREQUENCY: CONTINUOUS

## 2024-03-24 ASSESSMENT — PAIN DESCRIPTION - ONSET: ONSET: ON-GOING

## 2024-03-24 ASSESSMENT — PAIN DESCRIPTION - ORIENTATION: ORIENTATION: RIGHT;POSTERIOR

## 2024-03-24 NOTE — ED PROVIDER NOTES
EMERGENCY DEPARTMENT ENCOUNTER    Pt Name: Alida Barakat  MRN: [id number]  Birthdate 1935  Date of evaluation: 3/24/24  CHIEF COMPLAINT       Chief Complaint   Patient presents with    Fall    Head Injury     Posterior right      HISTORY OF PRESENT ILLNESS   HPI  Was walking backwards with her 4 wheeled walker when she tripped and fell and hit the back of her head on the table.  Complaining of pain in the back of her head, some mild neck discomfort taking pain meds.  She was at the [facility].  Assisted living.  Takes her medications.  Not on any anticoagulants anymore.  She is off her Eliquis now.  She is been off it for years.  Denies any other symptoms.    REVIEW OF SYSTEMS     Review of Systems   All other systems reviewed and are negative.    PASTMEDICAL HISTORY     Past Medical History:   Diagnosis Date    Acid reflux     Atrial fibrillation ([facility])     CARDIOLOGIST - DR. FARMER    Hyperlipidemia     Thyroid disease     TIA (transient ischemic attack)     SPENT 3 WKS IN REHAB    Wears dentures     UPPER     Past Problem List  Patient Active Problem List   Diagnosis Code    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 U07.1, G93.49    Stroke determined by clinical assessment ([facility]) I63.9    Acute ischemic stroke ([facility]) I63.9    Cognitive impairment R41.89    Cerebrovascular accident (CVA) ([facility]) I63.9    Stenosis of right carotid artery I65.21    Status post angioplasty with stent Z95.820    Fall at home, initial encounter W19.XXXA, Y92.009     SURGICAL HISTORY       Past Surgical History:   Procedure Laterality Date    BLADDER SURGERY      suspension    HERNIA REPAIR  2012    x2    HYSTERECTOMY (CERVIX STATUS UNKNOWN)       CURRENT MEDICATIONS       Current Discharge Medication List        CONTINUE these medications which have NOT CHANGED    Details   !! betamethasone valerate (VALISONE) 0.1 % cream Apply topically 2 times daily.  Qty: 45 g, Refills: 2      !! betamethasone valerate (VALISONE) 0.1 % cream Apply

## 2024-03-24 NOTE — ED TRIAGE NOTES
Mode of arrival (squad #, walk in, police, etc) : EMS        Chief complaint(s): Fall, neck and right posterior head pain        Arrival Note (brief scenario, treatment PTA, etc).: Patient was walking around in bedroom when walker became entangled in rug, causing patient to fall backwards, striking posterior head to floor. Patient was able to get up off floor and continue to get ready for breakfast, but was sent over by staff to receive medical evaluation. Patient oriented to room and nurse call-light.         C= \"Have you ever felt that you should Cut down on your drinking?\"  No  A= \"Have people Annoyed you by criticizing your drinking?\"  No  G= \"Have you ever felt bad or Guilty about your drinking?\"  No  E= \"Have you ever had a drink as an Eye-opener first thing in the morning to steady your nerves or to help a hangover?\"  No      Deferred []      Reason for deferring: N/A    *If yes to two or more: probable alcohol abuse.*

## 2024-03-24 NOTE — H&P
[facility]  IN-PATIENT SERVICE  [facility]  IN-PATIENT SERVICE   [facility]     HISTORY AND PHYSICAL EXAMINATION            Date:   3/24/2024  Patient name:  Alida Barakat  Date of admission:  3/24/2024  9:24 AM  MRN:   [id number]  Account:  [id number]  YOB: 1935  PCP:    Elizabeth Ballesteros APRN - CNP  Room:   12/12  Code Status:    Prior    Chief Complaint:     Chief Complaint   Patient presents with    Fall    Head Injury     Posterior right        History Obtained From:     patient, electronic medical record    History of Present Illness:       88-year-old female known case of hyperlipidemia, hypothyroidism, history of CVA, history of carotid stenosis c/p stenting, hypertension and dementia presented from her assisted living facility after mechanical fall.  Patient states that she woke up completely normal, was getting her clothes from her closet using her 4 wheeled walker and as she went to turn around, she tripped and fell and hit the back of her head.  Patient denies any headaches, lightheadedness, dizziness, change in baseline, palpitations, chest pain, shortness of breath or any other concerning symptoms.    Patient states that a long time ago she had a funny heart rhythm history but has not been on any anticoagulants anymore.  Reports taking all her other medications.  Workup was done in the ED, did not show any acute injury on imaging.  Patient's tropes are slightly elevated but downtrending, EKG showed some ST and T wave changes concerning for possible ischemia, cardiology was consulted and recommended patient be admitted for monitoring and ruling out ACS.      Past Medical History:     Past Medical History:   Diagnosis Date    Acid reflux     Atrial fibrillation ([facility])     CARDIOLOGIST - DR. FARMER    Hyperlipidemia     Thyroid disease     TIA (transient ischemic attack)     SPENT 3 WKS IN

## 2024-03-24 NOTE — PROGRESS NOTES
RN attempted to contact [facility] to verify medication list. Left message for return call.

## 2024-03-24 NOTE — ED NOTES
Verbal shift change report given to ROXANNE Jurado (oncoming nurse) by ROXANNE Brennan (offgoing nurse). Report included the following information Nurse Handoff Report, ED Encounter Summary, ED SBAR, Adult Overview, MAR, Recent Results, and Event Log.

## 2024-03-25 ENCOUNTER — APPOINTMENT (OUTPATIENT)
Age: 89
DRG: 315 | End: 2024-03-25
Payer: MEDICARE

## 2024-03-25 ENCOUNTER — HOSPITAL ENCOUNTER (INPATIENT)
Age: 89
Discharge: HOME | DRG: 315 | End: 2024-03-27
Payer: MEDICARE

## 2024-03-25 ENCOUNTER — APPOINTMENT (OUTPATIENT)
Dept: NUCLEAR MEDICINE | Age: 89
DRG: 315 | End: 2024-03-25
Payer: MEDICARE

## 2024-03-25 VITALS
HEIGHT: 65 IN | OXYGEN SATURATION: 97 % | RESPIRATION RATE: 18 BRPM | HEART RATE: 60 BPM | BODY MASS INDEX: 20.66 KG/M2 | DIASTOLIC BLOOD PRESSURE: 83 MMHG | WEIGHT: 124 LBS | SYSTOLIC BLOOD PRESSURE: 153 MMHG | TEMPERATURE: 97.3 F

## 2024-03-25 LAB
25(OH)D3 SERPL-MCNC: 86.2 NG/ML (ref 30–100)
ANION GAP SERPL CALCULATED.3IONS-SCNC: 11 MMOL/L (ref 9–17)
BASOPHILS # BLD: 0 K/UL (ref 0–0.2)
BASOPHILS NFR BLD: 1 % (ref 0–2)
BUN SERPL-MCNC: 16 MG/DL (ref 8–23)
CALCIUM SERPL-MCNC: 9.4 MG/DL (ref 8.6–10.4)
CHLORIDE SERPL-SCNC: 102 MMOL/L (ref 98–107)
CO2 SERPL-SCNC: 25 MMOL/L (ref 20–31)
CORTIS SERPL-MCNC: 10.1 UG/DL (ref 2.5–19.5)
CORTISOL COLLECTION INFO: NORMAL
CREAT SERPL-MCNC: 1.3 MG/DL (ref 0.5–0.9)
ECHO BSA: 1.61 M2
EKG ATRIAL RATE: 105 BPM
EKG P AXIS: -93 DEGREES
EKG Q-T INTERVAL: 468 MS
EKG QRS DURATION: 74 MS
EKG QTC CALCULATION (BAZETT): 482 MS
EKG R AXIS: 32 DEGREES
EKG T AXIS: -108 DEGREES
EKG VENTRICULAR RATE: 64 BPM
EOSINOPHIL # BLD: 0.2 K/UL (ref 0–0.4)
EOSINOPHILS RELATIVE PERCENT: 3 % (ref 0–4)
ERYTHROCYTE [DISTWIDTH] IN BLOOD BY AUTOMATED COUNT: 16.4 % (ref 11.5–14.9)
FOLATE SERPL-MCNC: 3.9 NG/ML (ref 4.8–24.2)
GFR SERPL CREATININE-BSD FRML MDRD: 40 ML/MIN/1.73M2
GLUCOSE SERPL-MCNC: 106 MG/DL (ref 70–99)
HCT VFR BLD AUTO: 37.3 % (ref 36–46)
HGB BLD-MCNC: 12.6 G/DL (ref 12–16)
LYMPHOCYTES NFR BLD: 1.7 K/UL (ref 1–4.8)
LYMPHOCYTES RELATIVE PERCENT: 25 % (ref 24–44)
MCH RBC QN AUTO: 33.7 PG (ref 26–34)
MCHC RBC AUTO-ENTMCNC: 33.7 G/DL (ref 31–37)
MCV RBC AUTO: 99.9 FL (ref 80–100)
MONOCYTES NFR BLD: 0.7 K/UL (ref 0.1–1.3)
MONOCYTES NFR BLD: 11 % (ref 1–7)
NEUTROPHILS NFR BLD: 60 % (ref 36–66)
NEUTS SEG NFR BLD: 4 K/UL (ref 1.3–9.1)
PLATELET # BLD AUTO: 199 K/UL (ref 150–450)
PMV BLD AUTO: 7.3 FL (ref 6–12)
POTASSIUM SERPL-SCNC: 4.1 MMOL/L (ref 3.7–5.3)
RBC # BLD AUTO: 3.73 M/UL (ref 4–5.2)
SODIUM SERPL-SCNC: 138 MMOL/L (ref 135–144)
STRESS BASELINE DIAS BP: 94 MMHG
STRESS BASELINE HR: 67 BPM
STRESS BASELINE SYS BP: 157 MMHG
STRESS PEAK DIAS BP: 89 MMHG
STRESS PEAK SYS BP: 170 MMHG
STRESS PERCENT HR ACHIEVED: 64 %
STRESS POST PEAK HR: 86 BPM
STRESS RATE PRESSURE PRODUCT: NORMAL BPM*MMHG
STRESS STAGE RECOVERY 3 BP: NORMAL MMHG
STRESS STAGE RECOVERY 3 DURATION: 3 MIN:SEC
STRESS STAGE RECOVERY 3 HR: 81 BPM
STRESS STAGE RECOVERY 4 BP: NORMAL MMHG
STRESS STAGE RECOVERY 4 DURATION: 6 MIN:SEC
STRESS STAGE RECOVERY 4 HR: 80 BPM
STRESS TARGET HR: 134 BPM
T4 FREE SERPL-MCNC: 0.2 NG/DL (ref 0.9–1.7)
TSH SERPL DL<=0.05 MIU/L-ACNC: 150.4 UIU/ML (ref 0.3–5)
VIT B12 SERPL-MCNC: 1444 PG/ML (ref 232–1245)
WBC OTHER # BLD: 6.6 K/UL (ref 3.5–11)

## 2024-03-25 PROCEDURE — 93010 ELECTROCARDIOGRAM REPORT: CPT | Performed by: INTERNAL MEDICINE

## 2024-03-25 PROCEDURE — 84443 ASSAY THYROID STIM HORMONE: CPT

## 2024-03-25 PROCEDURE — 80048 BASIC METABOLIC PNL TOTAL CA: CPT

## 2024-03-25 PROCEDURE — 97166 OT EVAL MOD COMPLEX 45 MIN: CPT

## 2024-03-25 PROCEDURE — 6360000002 HC RX W HCPCS

## 2024-03-25 PROCEDURE — 3430000000 HC RX DIAGNOSTIC RADIOPHARMACEUTICAL: Performed by: INTERNAL MEDICINE

## 2024-03-25 PROCEDURE — 6370000000 HC RX 637 (ALT 250 FOR IP)

## 2024-03-25 PROCEDURE — 82607 VITAMIN B-12: CPT

## 2024-03-25 PROCEDURE — 97162 PT EVAL MOD COMPLEX 30 MIN: CPT

## 2024-03-25 PROCEDURE — 2580000003 HC RX 258: Performed by: INTERNAL MEDICINE

## 2024-03-25 PROCEDURE — 2580000003 HC RX 258

## 2024-03-25 PROCEDURE — 78452 HT MUSCLE IMAGE SPECT MULT: CPT

## 2024-03-25 PROCEDURE — 85025 COMPLETE CBC W/AUTO DIFF WBC: CPT

## 2024-03-25 PROCEDURE — 97530 THERAPEUTIC ACTIVITIES: CPT

## 2024-03-25 PROCEDURE — A9500 TC99M SESTAMIBI: HCPCS | Performed by: INTERNAL MEDICINE

## 2024-03-25 PROCEDURE — 82306 VITAMIN D 25 HYDROXY: CPT

## 2024-03-25 PROCEDURE — 82533 TOTAL CORTISOL: CPT

## 2024-03-25 PROCEDURE — 84439 ASSAY OF FREE THYROXINE: CPT

## 2024-03-25 PROCEDURE — 93017 CV STRESS TEST TRACING ONLY: CPT

## 2024-03-25 PROCEDURE — 99232 SBSQ HOSP IP/OBS MODERATE 35: CPT | Performed by: INTERNAL MEDICINE

## 2024-03-25 PROCEDURE — 82746 ASSAY OF FOLIC ACID SERUM: CPT

## 2024-03-25 PROCEDURE — 97116 GAIT TRAINING THERAPY: CPT

## 2024-03-25 PROCEDURE — 6370000000 HC RX 637 (ALT 250 FOR IP): Performed by: NURSE PRACTITIONER

## 2024-03-25 PROCEDURE — 6360000002 HC RX W HCPCS: Performed by: INTERNAL MEDICINE

## 2024-03-25 PROCEDURE — 6370000000 HC RX 637 (ALT 250 FOR IP): Performed by: INTERNAL MEDICINE

## 2024-03-25 PROCEDURE — 93306 TTE W/DOPPLER COMPLETE: CPT

## 2024-03-25 PROCEDURE — 36415 COLL VENOUS BLD VENIPUNCTURE: CPT

## 2024-03-25 RX ORDER — MORPHINE SULFATE 2 MG/ML
1 INJECTION, SOLUTION INTRAMUSCULAR; INTRAVENOUS ONCE
Status: DISCONTINUED | OUTPATIENT
Start: 2024-03-25 | End: 2024-03-25 | Stop reason: HOSPADM

## 2024-03-25 RX ORDER — SODIUM CHLORIDE 9 MG/ML
500 INJECTION, SOLUTION INTRAVENOUS CONTINUOUS PRN
Status: CANCELLED | OUTPATIENT
Start: 2024-03-25 | End: 2024-03-25

## 2024-03-25 RX ORDER — METOPROLOL TARTRATE 1 MG/ML
5 INJECTION, SOLUTION INTRAVENOUS EVERY 5 MIN PRN
Status: CANCELLED | OUTPATIENT
Start: 2024-03-25 | End: 2024-03-25

## 2024-03-25 RX ORDER — AMINOPHYLLINE 25 MG/ML
50 INJECTION, SOLUTION INTRAVENOUS PRN
Status: CANCELLED | OUTPATIENT
Start: 2024-03-25 | End: 2024-03-25

## 2024-03-25 RX ORDER — TETRAKIS(2-METHOXYISOBUTYLISOCYANIDE)COPPER(I) TETRAFLUOROBORATE 1 MG/ML
15 INJECTION, POWDER, LYOPHILIZED, FOR SOLUTION INTRAVENOUS
Status: COMPLETED | OUTPATIENT
Start: 2024-03-25 | End: 2024-03-25

## 2024-03-25 RX ORDER — SODIUM CHLORIDE 0.9 % (FLUSH) 0.9 %
10 SYRINGE (ML) INJECTION PRN
Status: DISCONTINUED | OUTPATIENT
Start: 2024-03-25 | End: 2024-03-25 | Stop reason: HOSPADM

## 2024-03-25 RX ORDER — REGADENOSON 0.08 MG/ML
0.4 INJECTION, SOLUTION INTRAVENOUS
Status: CANCELLED | OUTPATIENT
Start: 2024-03-25

## 2024-03-25 RX ORDER — NITROGLYCERIN 0.4 MG/1
0.4 TABLET SUBLINGUAL EVERY 5 MIN PRN
Status: ACTIVE | OUTPATIENT
Start: 2024-03-25 | End: 2024-03-25

## 2024-03-25 RX ORDER — AMINOPHYLLINE 25 MG/ML
50 INJECTION, SOLUTION INTRAVENOUS PRN
Status: ACTIVE | OUTPATIENT
Start: 2024-03-25 | End: 2024-03-25

## 2024-03-25 RX ORDER — LEVOTHYROXINE SODIUM 0.07 MG/1
75 TABLET ORAL
Status: DISCONTINUED | OUTPATIENT
Start: 2024-03-26 | End: 2024-03-25

## 2024-03-25 RX ORDER — TETRAKIS(2-METHOXYISOBUTYLISOCYANIDE)COPPER(I) TETRAFLUOROBORATE 1 MG/ML
35 INJECTION, POWDER, LYOPHILIZED, FOR SOLUTION INTRAVENOUS
Status: COMPLETED | OUTPATIENT
Start: 2024-03-25 | End: 2024-03-25

## 2024-03-25 RX ORDER — ALBUTEROL SULFATE 90 UG/1
2 AEROSOL, METERED RESPIRATORY (INHALATION) PRN
Status: CANCELLED | OUTPATIENT
Start: 2024-03-25 | End: 2024-03-25

## 2024-03-25 RX ORDER — SODIUM CHLORIDE 9 MG/ML
500 INJECTION, SOLUTION INTRAVENOUS CONTINUOUS PRN
Status: ACTIVE | OUTPATIENT
Start: 2024-03-25 | End: 2024-03-25

## 2024-03-25 RX ORDER — LEVOTHYROXINE SODIUM 0.07 MG/1
75 TABLET ORAL
Status: DISCONTINUED | OUTPATIENT
Start: 2024-03-26 | End: 2024-03-25 | Stop reason: HOSPADM

## 2024-03-25 RX ORDER — LEVOTHYROXINE SODIUM 0.07 MG/1
75 TABLET ORAL
Qty: 30 TABLET | Refills: 3 | Status: SHIPPED | OUTPATIENT
Start: 2024-03-26

## 2024-03-25 RX ORDER — METOPROLOL TARTRATE 1 MG/ML
5 INJECTION, SOLUTION INTRAVENOUS EVERY 5 MIN PRN
Status: ACTIVE | OUTPATIENT
Start: 2024-03-25 | End: 2024-03-25

## 2024-03-25 RX ORDER — ATROPINE SULFATE 0.1 MG/ML
0.5 INJECTION INTRAVENOUS EVERY 5 MIN PRN
Status: CANCELLED | OUTPATIENT
Start: 2024-03-25 | End: 2024-03-25

## 2024-03-25 RX ORDER — HYDROCODONE BITARTRATE AND ACETAMINOPHEN 5; 325 MG/1; MG/1
1 TABLET ORAL ONCE
Status: DISCONTINUED | OUTPATIENT
Start: 2024-03-25 | End: 2024-03-25

## 2024-03-25 RX ORDER — ALBUTEROL SULFATE 90 UG/1
2 AEROSOL, METERED RESPIRATORY (INHALATION) PRN
Status: ACTIVE | OUTPATIENT
Start: 2024-03-25 | End: 2024-03-25

## 2024-03-25 RX ORDER — FOLIC ACID 1 MG/1
1 TABLET ORAL DAILY
Qty: 30 TABLET | Refills: 3 | Status: SHIPPED | OUTPATIENT
Start: 2024-03-26

## 2024-03-25 RX ORDER — ATROPINE SULFATE 0.1 MG/ML
0.5 INJECTION INTRAVENOUS EVERY 5 MIN PRN
Status: ACTIVE | OUTPATIENT
Start: 2024-03-25 | End: 2024-03-25

## 2024-03-25 RX ORDER — REGADENOSON 0.08 MG/ML
0.4 INJECTION, SOLUTION INTRAVENOUS
Status: COMPLETED | OUTPATIENT
Start: 2024-03-25 | End: 2024-03-25

## 2024-03-25 RX ORDER — NITROGLYCERIN 0.4 MG/1
0.4 TABLET SUBLINGUAL EVERY 5 MIN PRN
Status: CANCELLED | OUTPATIENT
Start: 2024-03-25 | End: 2024-03-25

## 2024-03-25 RX ORDER — SODIUM CHLORIDE 0.9 % (FLUSH) 0.9 %
5-40 SYRINGE (ML) INJECTION PRN
Status: CANCELLED | OUTPATIENT
Start: 2024-03-25 | End: 2024-03-25

## 2024-03-25 RX ORDER — SODIUM CHLORIDE 0.9 % (FLUSH) 0.9 %
5-40 SYRINGE (ML) INJECTION PRN
Status: ACTIVE | OUTPATIENT
Start: 2024-03-25 | End: 2024-03-25

## 2024-03-25 RX ORDER — CALCIUM CARBONATE 500 MG/1
1000 TABLET, CHEWABLE ORAL 3 TIMES DAILY PRN
Status: DISCONTINUED | OUTPATIENT
Start: 2024-03-25 | End: 2024-03-25 | Stop reason: HOSPADM

## 2024-03-25 RX ORDER — FOLIC ACID 1 MG/1
1 TABLET ORAL DAILY
Status: DISCONTINUED | OUTPATIENT
Start: 2024-03-25 | End: 2024-03-25 | Stop reason: HOSPADM

## 2024-03-25 RX ADMIN — SODIUM CHLORIDE: 9 INJECTION, SOLUTION INTRAVENOUS at 14:08

## 2024-03-25 RX ADMIN — DULOXETINE HYDROCHLORIDE 30 MG: 30 CAPSULE, DELAYED RELEASE ORAL at 11:59

## 2024-03-25 RX ADMIN — HEPARIN SODIUM 5000 UNITS: 5000 INJECTION INTRAVENOUS; SUBCUTANEOUS at 14:31

## 2024-03-25 RX ADMIN — SODIUM CHLORIDE, PRESERVATIVE FREE 10 ML: 5 INJECTION INTRAVENOUS at 12:40

## 2024-03-25 RX ADMIN — SODIUM CHLORIDE, PRESERVATIVE FREE 10 ML: 5 INJECTION INTRAVENOUS at 09:26

## 2024-03-25 RX ADMIN — MEMANTINE 5 MG: 10 TABLET ORAL at 11:33

## 2024-03-25 RX ADMIN — HEPARIN SODIUM 5000 UNITS: 5000 INJECTION INTRAVENOUS; SUBCUTANEOUS at 06:11

## 2024-03-25 RX ADMIN — FOLIC ACID 1 MG: 1 TABLET ORAL at 11:59

## 2024-03-25 RX ADMIN — REGADENOSON 0.4 MG: 0.08 INJECTION, SOLUTION INTRAVENOUS at 09:21

## 2024-03-25 RX ADMIN — LEVOTHYROXINE SODIUM 100 MCG: 0.1 TABLET ORAL at 06:12

## 2024-03-25 RX ADMIN — ACETAMINOPHEN 650 MG: 325 TABLET ORAL at 17:43

## 2024-03-25 RX ADMIN — ANTACID TABLETS 1000 MG: 500 TABLET, CHEWABLE ORAL at 01:32

## 2024-03-25 RX ADMIN — AMLODIPINE BESYLATE 2.5 MG: 2.5 TABLET ORAL at 11:34

## 2024-03-25 RX ADMIN — Medication 16.4 MILLICURIE: at 09:25

## 2024-03-25 RX ADMIN — ACETAMINOPHEN 650 MG: 325 TABLET ORAL at 11:58

## 2024-03-25 RX ADMIN — ACETAMINOPHEN 650 MG: 325 TABLET ORAL at 03:45

## 2024-03-25 RX ADMIN — Medication 37.9 MILLICURIE: at 12:42

## 2024-03-25 RX ADMIN — PANTOPRAZOLE SODIUM 40 MG: 40 TABLET, DELAYED RELEASE ORAL at 11:34

## 2024-03-25 RX ADMIN — ASPIRIN 81 MG: 81 TABLET, COATED ORAL at 11:34

## 2024-03-25 RX ADMIN — DOCUSATE SODIUM 100 MG: 100 CAPSULE, LIQUID FILLED ORAL at 11:33

## 2024-03-25 ASSESSMENT — PAIN SCALES - GENERAL
PAINLEVEL_OUTOF10: 7
PAINLEVEL_OUTOF10: 3
PAINLEVEL_OUTOF10: 7
PAINLEVEL_OUTOF10: 10
PAINLEVEL_OUTOF10: 10

## 2024-03-25 ASSESSMENT — PAIN DESCRIPTION - ORIENTATION
ORIENTATION: LEFT
ORIENTATION: MID
ORIENTATION: LEFT

## 2024-03-25 ASSESSMENT — ENCOUNTER SYMPTOMS
SHORTNESS OF BREATH: 0
CHEST TIGHTNESS: 0
GASTROINTESTINAL NEGATIVE: 1

## 2024-03-25 ASSESSMENT — PAIN DESCRIPTION - DESCRIPTORS
DESCRIPTORS: DISCOMFORT
DESCRIPTORS: ACHING

## 2024-03-25 ASSESSMENT — PAIN DESCRIPTION - LOCATION
LOCATION: OTHER (COMMENT)
LOCATION: CHEST;ABDOMEN
LOCATION: OTHER (COMMENT)

## 2024-03-25 ASSESSMENT — PAIN - FUNCTIONAL ASSESSMENT: PAIN_FUNCTIONAL_ASSESSMENT: PREVENTS OR INTERFERES SOME ACTIVE ACTIVITIES AND ADLS

## 2024-03-25 NOTE — PLAN OF CARE
Problem: Discharge Planning  Goal: Discharge to home or other facility with appropriate resources  3/25/2024 0402 by Silvia Gonzalez RN  Outcome: Progressing     Problem: Pain  Goal: Verbalizes/displays adequate comfort level or baseline comfort level  3/25/2024 0402 by Silvia Gonzalez RN  Outcome: Progressing     Problem: Safety - Adult  Goal: Free from fall injury  3/25/2024 0402 by Silvia Gonzalez RN  Outcome: Progressing     Problem: Cardiovascular - Adult  Goal: Maintains optimal cardiac output and hemodynamic stability  Outcome: Progressing     Problem: Cardiovascular - Adult  Goal: Absence of cardiac dysrhythmias or at baseline  Outcome: Progressing     
  Problem: Discharge Planning  Goal: Discharge to home or other facility with appropriate resources  Outcome: Progressing     Problem: Pain  Goal: Verbalizes/displays adequate comfort level or baseline comfort level  Outcome: Progressing     Problem: Skin/Tissue Integrity  Goal: Absence of new skin breakdown  Description: 1.  Monitor for areas of redness and/or skin breakdown  2.  Assess vascular access sites hourly  3.  Every 4-6 hours minimum:  Change oxygen saturation probe site  4.  Every 4-6 hours:  If on nasal continuous positive airway pressure, respiratory therapy assess nares and determine need for appliance change or resting period.  Outcome: Progressing     Problem: ABCDS Injury Assessment  Goal: Absence of physical injury  Outcome: Progressing     Problem: Safety - Adult  Goal: Free from fall injury  Outcome: Progressing     
co-morbidity symptoms are monitored and maintained or improved  Outcome: Adequate for Discharge

## 2024-03-25 NOTE — CARE COORDINATION
Case Management Assessment  Initial Evaluation    Date/Time of Evaluation: 3/25/2024 3:36 PM  Assessment Completed by: Yani Pa RN    If patient is discharged prior to next notation, then this note serves as note for discharge by case management.    Patient Name: Alida Barakat                   YOB: 1935  Diagnosis: Elevated troponin [R79.89]  Fall, initial encounter [W19.XXXA]  Fall at home, initial encounter [W19.XXXA, Y92.009]  Acute electrocardiogram changes [R94.31]                   Date / Time: 3/24/2024  9:24 AM    Patient Admission Status: Inpatient   Readmission Risk (Low < 19, Mod (19-27), High > 27): Readmission Risk Score: 14    Current PCP: Elizabeth Ballesteros APRN - CNP  PCP verified by CM? No    Chart Reviewed: Yes      History Provided by: Patient  Patient Orientation: Alert and Oriented    Patient Cognition: Alert    Hospitalization in the last 30 days (Readmission):  No    If yes, Readmission Assessment in CM Navigator will be completed.    Advance Directives:      Code Status: Full Code   Patient's Primary Decision Maker is:      Primary Decision Maker: Heidy Farley - Child - [phone]    Discharge Planning:    Patient lives with: Family Members Type of Home: Assisted living  Primary Care Giver: Self  Patient Support Systems include: Family Members   Current Financial resources: Medicare  Current community resources: None  Current services prior to admission: Other (Comment)            Current DME:              Type of Home Care services:  PT    ADLS  Prior functional level: Independent in ADLs/IADLs  Current functional level: Independent in ADLs/IADLs    PT AM-PAC: 12 /24  OT AM-PAC: 18 /24    Family can provide assistance at DC: Yes  Would you like Case Management to discuss the discharge plan with any other family members/significant others, and if so, who? Yes  Plans to Return to Present Housing: Yes  Other Identified Issues/Barriers to RETURNING to current housing:

## 2024-03-25 NOTE — DISCHARGE INSTR - COC
Continuity of Care Form    Patient Name: Alida Barakat   :  1935  MRN:  [id number]    Admit date:  3/24/2024  Discharge date:  3/25/2024    Code Status Order: Full Code   Advance Directives:     Admitting Physician:  Fabio Samaniego MD  PCP: Elizabeth Ballesteros, APRN - CNP    Discharging Nurse: Mai AZUL  Discharging Hospital Unit/Room#: 2118/2118-01  Discharging Unit Phone Number: [phone]    Emergency Contact:   Extended Emergency Contact Information  Primary Emergency Contact: Afshan Barakat  Home Phone: [phone]  Mobile Phone: [phone]  Relation: Child  Secondary Emergency Contact: Alida Thomas   [facility]  Home Phone: [phone]  Relation: Child    Past Surgical History:  Past Surgical History:   Procedure Laterality Date    BLADDER SURGERY      suspension    HERNIA REPAIR  2012    x2    HYSTERECTOMY (CERVIX STATUS UNKNOWN)         Immunization History:   Immunization History   Administered Date(s) Administered    COVID-19, PFIZER PURPLE top, DILUTE for use, (age 12 y+), 30mcg/0.3mL 2021, 2021    Influenza, FLUAD, (age 65 y+), Adjuvanted, 0.5mL 2021    Pneumococcal, PPSV23, PNEUMOVAX 23, (age 2y+), SC/IM, 0.5mL 2021       Active Problems:  Patient Active Problem List   Diagnosis Code    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 U07.1, G93.49    Stroke determined by clinical assessment ([facility]) I63.9    Acute ischemic stroke ([facility]) I63.9    Cognitive impairment R41.89    Cerebrovascular accident (CVA) ([facility]) I63.9    Stenosis of right carotid artery I65.21    Status post angioplasty with stent Z95.820    Fall at home, initial encounter W19.XXXA, Y92.009       Isolation/Infection:   Isolation            No Isolation          Patient Infection Status       None to display            Nurse Assessment:  Last Vital Signs: BP (!) 159/82   Pulse 51   Temp 97 °F (36.1 °C) (Axillary)   Resp 18   Ht 1.651 m (5' 5\")   Wt 56.2 kg (124 lb)   SpO2 99%   BMI 20.63 kg/m²     Last

## 2024-03-25 NOTE — CONSULTS
[facility]  Inpatient Cardiology Consult             Date:   3/24/24  Patient name: Alida Barakat  Date of admission:  3/24/2024  9:24 AM  MRN:   [id number]  YOB: 1935      Reason for consultation:  Abnormal EKG    CHIEF COMPLAINT:  Mechanical fall      History Obtained From:  Patient, family and medical record    HISTORY OF PRESENT ILLNESS:      The patient is a 86 y.o woman with h/o HTN, HLP, hypothyroidism, CVA and history of carotid stenosis s/p left ICA stent who presented from her assisted living facility after mechanical fall. She denies any syncope or lightheadedness and has had no CP, SOB, edema or any palpitation.    Workup was done in the ED, did not show any acute injury on imaging.    Serial troponin is marginally elevated at 15 and 16 ng/L, however EKG shows new T wave inversions across the limb and precordial leads.    Past Medical History:   has a past medical history of Acid reflux, Atrial fibrillation (HCC), Hyperlipidemia, Thyroid disease, TIA (transient ischemic attack), and Wears dentures.    Past Surgical History:   has a past surgical history that includes Hysterectomy; hernia repair (2012); and Bladder surgery.     Home Medications:    Prior to Admission medications    Medication Sig Start Date End Date Taking? Authorizing Provider   betamethasone valerate (VALISONE) 0.1 % cream Apply topically 2 times daily. 8/30/23   Michaela Solomon MD   betamethasone valerate (VALISONE) 0.1 % cream Apply topically 2 times daily. 8/4/23   Anthony Garcia MD   hydrocortisone (PROCTO-MED HC) 2.5 % CREA rectal cream insert 1 applicatorful rectally four times a day if needed for HEMORRHOIDS 12/28/22   Caridad Nova, DO   DULoxetine (CYMBALTA) 30 MG extended release capsule Take 1 capsule by mouth daily    ProviderHam MD   lidocaine (HM LIDOCAINE PATCH) 4 % external patch Place 1 patch onto the skin daily To right shoulder    ProviderHam MD   meloxicam

## 2024-03-25 NOTE — PROGRESS NOTES
Physical Therapy  Facility/Department: [facility] PROGRESSIVE CARE  Physical Therapy Initial Assessment    Name: Alida Barakat  : 1935  MRN: [id number]  Date of Service: 3/25/2024    Discharge Recommendations:  Patient would benefit from continued therapy after discharge, Therapy recommended at discharge   PT Equipment Recommendations  Equipment Needed:  (TBD)      Patient Diagnosis(es): The primary encounter diagnosis was Fall, initial encounter. Diagnoses of Acute electrocardiogram changes and Elevated troponin were also pertinent to this visit.  Past Medical History:  has a past medical history of Acid reflux, Atrial fibrillation (HCC), Hyperlipidemia, Thyroid disease, TIA (transient ischemic attack), and Wears dentures.  Past Surgical History:  has a past surgical history that includes Hysterectomy; hernia repair (); and Bladder surgery.    Assessment   Body Structures, Functions, Activity Limitations Requiring Skilled Therapeutic Intervention: Decreased functional mobility ;Decreased strength;Decreased safe awareness;Decreased endurance;Decreased cognition;Decreased balance;Increased pain  Assessment: continue per POC to maxmize potential for safe D/C.  Pt needs assistance for all transfers and gait at this time.  Treatment Diagnosis: impaired mobility due to weakness  Specific Instructions for Next Treatment: advance gait distance using a rollator, co treat w/ OT, instruct in exercise program  Therapy Prognosis: Fair  Decision Making: Medium Complexity  History: pt admitted due to elevated troponins, fall and EKG changes  Exam: ROM, MMT, balance and mobility assessments  Clinical Presentation: min x 1 for bed mobility, min x 1 sit > stand, mod x 1 stand > sit- tends to plop down without reaching back; min x 1 w/ 2nd SBA for gait using wheeled walker 50' x 2- FALL RISK- ataxic gait pattern  Barriers to Learning: cognition- hx dementia  Requires PT Follow-Up: Yes  Activity Tolerance  Activity Tolerance:

## 2024-03-25 NOTE — PROGRESS NOTES
[facility]  IN-PATIENT SERVICE  [facility]    PROGRESS NOTE             3/25/2024    7:34 AM    Name:   Alida Barakat  MRN:     [id number]     Acct:      [id number]   Room:   2118/2118-01   Day:  1  Admit Date:  3/24/2024  9:24 AM    PCP:  Elizabeth Ballesteros APRN - CNP  Code Status:  Full Code    Subjective:     C/C:   Chief Complaint   Patient presents with    Fall    Head Injury     Posterior right      Interval History Status: improved.    Patient seen and examined at bedside.  States that she is doing well.  Patient does have mild cognitive dysfunction, explained to patient that she has her cardiac workup pending today.  Discharge is going to be dependent on workup.  Patient's TSH very elevated, denies any constipation, temperature symptoms.   T4 pending.    Brief History:     88-year-old female known case of hyperlipidemia, hypothyroidism, history of CVA, history of carotid stenosis c/p stenting, hypertension and dementia presented from her assisted living facility after mechanical fall.  Patient states that she woke up completely normal, was getting her clothes from her closet using her 4 wheeled walker and as she went to turn around, she tripped and fell and hit the back of her head.  Patient denies any headaches, lightheadedness, dizziness, change in baseline, palpitations, chest pain, shortness of breath or any other concerning symptoms.    Patient states that a long time ago she had a funny heart rhythm history but has not been on any anticoagulants anymore.  Reports taking all her other medications.  Workup was done in the ED, did not show any acute injury on imaging.  Patient's tropes are slightly elevated but downtrending, EKG showed some ST and T wave changes concerning for possible ischemia, cardiology was consulted and recommended patient be admitted for monitoring and ruling out ACS.    Review of Systems:     Review of Systems   Constitutional:

## 2024-03-25 NOTE — PROGRESS NOTES
[facility]   Occupational Therapy Evaluation  Date: 3/25/24  Patient Name: Alida Barakat       Room:   MRN: [id number]  Account: [id number]   : 1935  (88 y.o.) Gender: female     Discharge Recommendations:  Further Occupational Therapy is recommended upon facility discharge.    OT Equipment Recommendations  Other: TBD    Referring Practitioner: Nitin Skinner MD  Diagnosis: Fall at home   Additional Pertinent Hx: 88-year-old female known case of hyperlipidemia, hypothyroidism, history of CVA, history of carotid stenosis c/p stenting, hypertension and dementia presented from her assisted living facility after mechanical fall.  Patient states that she woke up completely normal, was getting her clothes from her closet using her 4 wheeled walker and as she went to turn around, she tripped and fell and hit the back of her head.    Treatment Diagnosis: Impaired self-care status    Past Medical History:  has a past medical history of Acid reflux, Atrial fibrillation (HCC), Hyperlipidemia, Thyroid disease, TIA (transient ischemic attack), and Wears dentures.    Past Surgical History:   has a past surgical history that includes Hysterectomy; hernia repair (); and Bladder surgery.    Restrictions  Restrictions/Precautions  Restrictions/Precautions: Fall Risk  Required Braces or Orthoses?: No  Implants present? : Metal implants (cardiac stents)      Vitals  Vitals  O2 Device: None (Room air)     Subjective  Subjective: \"I hate that walker (rolling walker). I don't feel safe with it\" Pt is pleasant and agreeable for therapy  Comments: Okay for OT PT eval and treat per RN  Subjective  Pain: 5/10 left groin      Social/Functional History  Social/Functional History  Lives With: Alone  Type of Home: Assisted living  Home Layout: One level  Home Access: Level entry  Bathroom Shower/Tub: Walk-in shower  Bathroom Toilet: Standard  Bathroom Equipment: Grab bars in shower, Built-in

## 2024-03-25 NOTE — PROGRESS NOTES
CLINICAL PHARMACY NOTE: MEDS TO BEDS    Total # of Prescriptions Filled: 2   The following medications were delivered to the patient:  Folic Acid 1mg  Levothyroxine 75mcg     Additional Documentation: delivered to Nurse Ijeoma 3/25/24 5:04pm kbg daughter paid via phone

## 2024-03-26 LAB
ECHO AO ROOT DIAM: 3.3 CM
ECHO AO ROOT INDEX: 2.05 CM/M2
ECHO AV AREA PEAK VELOCITY: 2.3 CM2
ECHO AV AREA/BSA PEAK VELOCITY: 1.4 CM2/M2
ECHO AV PEAK GRADIENT: 6 MMHG
ECHO AV PEAK VELOCITY: 1.2 M/S
ECHO AV VELOCITY RATIO: 0.92
ECHO BSA: 1.61 M2
ECHO LA AREA 2C: 22 CM2
ECHO LA AREA 4C: 20.9 CM2
ECHO LA DIAMETER INDEX: 2.42 CM/M2
ECHO LA DIAMETER: 3.9 CM
ECHO LA MAJOR AXIS: 6 CM
ECHO LA MINOR AXIS: 5.9 CM
ECHO LA TO AORTIC ROOT RATIO: 1.18
ECHO LA VOL BP: 61 ML (ref 22–52)
ECHO LA VOL MOD A2C: 66 ML (ref 22–52)
ECHO LA VOL MOD A4C: 56 ML (ref 22–52)
ECHO LA VOL/BSA BIPLANE: 38 ML/M2 (ref 16–34)
ECHO LA VOLUME INDEX MOD A2C: 41 ML/M2 (ref 16–34)
ECHO LA VOLUME INDEX MOD A4C: 35 ML/M2 (ref 16–34)
ECHO LV FRACTIONAL SHORTENING: 33 % (ref 28–44)
ECHO LV INTERNAL DIMENSION DIASTOLE INDEX: 3.35 CM/M2
ECHO LV INTERNAL DIMENSION DIASTOLIC: 5.4 CM (ref 3.9–5.3)
ECHO LV INTERNAL DIMENSION SYSTOLIC INDEX: 2.24 CM/M2
ECHO LV INTERNAL DIMENSION SYSTOLIC: 3.6 CM
ECHO LV IVSD: 1 CM (ref 0.6–0.9)
ECHO LV MASS 2D: 206.7 G (ref 67–162)
ECHO LV MASS INDEX 2D: 128.4 G/M2 (ref 43–95)
ECHO LV POSTERIOR WALL DIASTOLIC: 1 CM (ref 0.6–0.9)
ECHO LV RELATIVE WALL THICKNESS RATIO: 0.37
ECHO LVOT AREA: 2.5 CM2
ECHO LVOT DIAM: 1.8 CM
ECHO LVOT MEAN GRADIENT: 2 MMHG
ECHO LVOT PEAK GRADIENT: 5 MMHG
ECHO LVOT PEAK VELOCITY: 1.1 M/S
ECHO LVOT STROKE VOLUME INDEX: 29.9 ML/M2
ECHO LVOT SV: 48.1 ML
ECHO LVOT VTI: 18.9 CM
ECHO MV A VELOCITY: 0.5 M/S
ECHO MV E DECELERATION TIME (DT): 377 MS
ECHO MV E VELOCITY: 0.74 M/S
ECHO MV E/A RATIO: 1.48
ECHO PV MAX VELOCITY: 0.9 M/S
ECHO PV PEAK GRADIENT: 3 MMHG
ECHO RV TAPSE: 1.9 CM (ref 1.7–?)
EKG ATRIAL RATE: 59 BPM
EKG P AXIS: -58 DEGREES
EKG P-R INTERVAL: 136 MS
EKG Q-T INTERVAL: 470 MS
EKG QRS DURATION: 74 MS
EKG QTC CALCULATION (BAZETT): 465 MS
EKG R AXIS: 7 DEGREES
EKG T AXIS: -118 DEGREES
EKG VENTRICULAR RATE: 59 BPM

## 2024-03-26 PROCEDURE — 93010 ELECTROCARDIOGRAM REPORT: CPT | Performed by: INTERNAL MEDICINE

## 2024-03-27 NOTE — DISCHARGE SUMMARY
intake.    Discharge Medications:      Medication List        START taking these medications      folic acid 1 MG tablet  Commonly known as: FOLVITE  Take 1 tablet by mouth daily            CHANGE how you take these medications      Creon 23572-00587 units delayed release capsule  Generic drug: lipase-protease-amylase  take 1 capsule by mouth three times a day with meals  What changed: See the new instructions.     levothyroxine 75 MCG tablet  Commonly known as: SYNTHROID  Take 1 tablet by mouth every morning (before breakfast)  What changed:   medication strength  how much to take  how to take this  when to take this  Another medication with the same name was removed. Continue taking this medication, and follow the directions you see here.            CONTINUE taking these medications      acetaminophen 500 MG tablet  Commonly known as: TYLENOL     amLODIPine 2.5 MG tablet  Commonly known as: NORVASC  Take 1 tablet by mouth daily     apixaban 2.5 MG Tabs tablet  Commonly known as: Eliquis  Take 1 tablet by mouth 2 times daily     aspirin 81 MG EC tablet  take 1 tablet by mouth once daily     * betamethasone valerate 0.1 % cream  Commonly known as: VALISONE  Apply topically 2 times daily.     * betamethasone valerate 0.1 % cream  Commonly known as: VALISONE  Apply topically 2 times daily.     docusate sodium 100 MG capsule  Commonly known as: COLACE     DULoxetine 30 MG extended release capsule  Commonly known as: CYMBALTA     HM Lidocaine Patch 4 % external patch  Generic drug: lidocaine     hydrocortisone 2.5 % Crea rectal cream  Commonly known as: Procto-Med HC  insert 1 applicatorful rectally four times a day if needed for HEMORRHOIDS     magnesium oxide 400 MG tablet  Commonly known as: MAG-OX     meloxicam 7.5 MG tablet  Commonly known as: MOBIC     memantine 10 MG tablet  Commonly known as: Namenda  Take 1 tablet by mouth 2 times daily     MIRALAX PO     nystatin [id number] UNIT/GM cream  Commonly known as:

## 2024-06-04 ENCOUNTER — HOSPITAL ENCOUNTER (OUTPATIENT)
Dept: RADIATION ONCOLOGY | Age: 89
Discharge: HOME | End: 2024-06-04
Payer: MEDICARE

## 2024-06-04 VITALS
TEMPERATURE: 97.9 F | SYSTOLIC BLOOD PRESSURE: 142 MMHG | WEIGHT: 130.4 LBS | HEART RATE: 67 BPM | DIASTOLIC BLOOD PRESSURE: 73 MMHG | OXYGEN SATURATION: 97 % | BODY MASS INDEX: 21.7 KG/M2 | RESPIRATION RATE: 16 BRPM

## 2024-06-04 PROCEDURE — 99212 OFFICE O/P EST SF 10 MIN: CPT | Performed by: RADIOLOGY

## 2024-06-04 ASSESSMENT — PAIN DESCRIPTION - ORIENTATION: ORIENTATION: RIGHT;LEFT

## 2024-06-04 ASSESSMENT — PAIN DESCRIPTION - LOCATION: LOCATION: SHOULDER

## 2024-06-04 ASSESSMENT — PAIN SCALES - GENERAL: PAINLEVEL_OUTOF10: 10

## 2024-06-04 NOTE — PROGRESS NOTES
3 times daily (with meals) ), Disp: 270 capsule, Rfl: 3    acetaminophen (TYLENOL) 500 MG tablet, Take 2 tablets by mouth as needed, Disp: , Rfl:     magnesium oxide (MAG-OX) 400 MG tablet, Take 1 tablet by mouth 2 times daily, Disp: , Rfl:     vitamin D (ERGOCALCIFEROL) 1.25 MG (60076 UT) CAPS capsule, Take 1 capsule by mouth once a week (Patient taking differently: Take 5,000 Units by mouth daily), Disp: 12 capsule, Rfl: 3    apixaban (ELIQUIS) 2.5 MG TABS tablet, Take 1 tablet by mouth 2 times daily, Disp: 60 tablet, Rfl: 3    amLODIPine (NORVASC) 2.5 MG tablet, Take 1 tablet by mouth daily, Disp: 30 tablet, Rfl: 3    Cyanocobalamin (VITAMIN B-12 PO), Take by mouth daily (Patient not taking: Reported on 10/20/2021), Disp: , Rfl:     simvastatin (ZOCOR) 10 MG tablet, take 1 tablet by mouth every evening, Disp: 90 tablet, Rfl: 3    oxybutynin (DITROPAN XL) 10 MG extended release tablet, Take 1 tablet by mouth daily (Patient not taking: Reported on 6/4/2024), Disp: 30 tablet, Rfl: 3    docusate sodium (COLACE) 100 MG capsule, Take 100 mg by mouth 2 times daily, Disp: , Rfl:     nystatin (MYCOSTATIN) 903843 UNIT/GM cream, Apply topically 2 times daily., Disp: 1 Tube, Rfl: 2      Smoking Status:    [] Smoker - PPD:   [] Nonsmoker - Quit Date:               [x] Never a smoker      Cancer Screening:  Colonoscopy   [] Current       [x] Not current   [] Not current, but scheduled   [] NA  Mammogram   [] Current       [x] Not current   [] Not current, but scheduled   [] NA  Prostate           [] Current       [] Not current   [] Not current, but scheduled   [x] NA  PAP/Pelvic      [] Current       [] Not current   [] Not current, but scheduled   [x] NA  Skin                 [x] Current       [] Not current   [] Not current, but scheduled   [] NA     Hormone:  Lupron []   Last dose given:           Next dose due:   Eligard []   Last dose given:           Next dose due:   Anti-Estrogen Hormonal Therapy []   Medication name: 
other health care providers and interpretation of results independently. This note is created with the assistance of a speech recognition program.  While intending to generate a document that actually reflects the content of the visit, the document can still have some errors including those of syntax and sound a like substitutions which may escape proof reading.  It such instances, actual meaning can be extrapolated by contextual diversion.